# Patient Record
Sex: MALE | Race: WHITE
[De-identification: names, ages, dates, MRNs, and addresses within clinical notes are randomized per-mention and may not be internally consistent; named-entity substitution may affect disease eponyms.]

---

## 2019-03-07 ENCOUNTER — HOSPITAL ENCOUNTER (INPATIENT)
Dept: HOSPITAL 10 - E/R | Age: 69
LOS: 10 days | Discharge: NURSING, REHAB OR LONG-TERM CARE | DRG: 871 | End: 2019-03-17
Admitting: INTERNAL MEDICINE
Payer: MEDICARE

## 2019-03-07 ENCOUNTER — HOSPITAL ENCOUNTER (INPATIENT)
Dept: HOSPITAL 91 - E/R | Age: 69
LOS: 10 days | Discharge: NURSING, REHAB OR LONG-TERM CARE | DRG: 871 | End: 2019-03-17
Payer: MEDICARE

## 2019-03-07 VITALS — DIASTOLIC BLOOD PRESSURE: 42 MMHG | HEART RATE: 74 BPM | RESPIRATION RATE: 19 BRPM | SYSTOLIC BLOOD PRESSURE: 87 MMHG

## 2019-03-07 VITALS — SYSTOLIC BLOOD PRESSURE: 105 MMHG | HEART RATE: 85 BPM | RESPIRATION RATE: 20 BRPM | DIASTOLIC BLOOD PRESSURE: 68 MMHG

## 2019-03-07 VITALS — HEART RATE: 67 BPM

## 2019-03-07 VITALS
WEIGHT: 155.43 LBS | WEIGHT: 155.43 LBS | HEIGHT: 67 IN | BODY MASS INDEX: 24.39 KG/M2 | BODY MASS INDEX: 24.39 KG/M2 | HEIGHT: 67 IN

## 2019-03-07 VITALS — HEART RATE: 72 BPM

## 2019-03-07 VITALS — SYSTOLIC BLOOD PRESSURE: 86 MMHG | DIASTOLIC BLOOD PRESSURE: 44 MMHG | RESPIRATION RATE: 17 BRPM | HEART RATE: 66 BPM

## 2019-03-07 DIAGNOSIS — E03.9: ICD-10-CM

## 2019-03-07 DIAGNOSIS — I25.10: ICD-10-CM

## 2019-03-07 DIAGNOSIS — K29.70: ICD-10-CM

## 2019-03-07 DIAGNOSIS — N40.0: ICD-10-CM

## 2019-03-07 DIAGNOSIS — D62: ICD-10-CM

## 2019-03-07 DIAGNOSIS — J18.9: ICD-10-CM

## 2019-03-07 DIAGNOSIS — J96.01: ICD-10-CM

## 2019-03-07 DIAGNOSIS — Z89.511: ICD-10-CM

## 2019-03-07 DIAGNOSIS — G92: ICD-10-CM

## 2019-03-07 DIAGNOSIS — Z99.2: ICD-10-CM

## 2019-03-07 DIAGNOSIS — E78.5: ICD-10-CM

## 2019-03-07 DIAGNOSIS — I13.11: ICD-10-CM

## 2019-03-07 DIAGNOSIS — I21.4: ICD-10-CM

## 2019-03-07 DIAGNOSIS — I25.2: ICD-10-CM

## 2019-03-07 DIAGNOSIS — Z91.19: ICD-10-CM

## 2019-03-07 DIAGNOSIS — A41.9: Primary | ICD-10-CM

## 2019-03-07 DIAGNOSIS — N18.6: ICD-10-CM

## 2019-03-07 DIAGNOSIS — E11.22: ICD-10-CM

## 2019-03-07 DIAGNOSIS — Z95.1: ICD-10-CM

## 2019-03-07 DIAGNOSIS — R62.7: ICD-10-CM

## 2019-03-07 DIAGNOSIS — I21.A1: ICD-10-CM

## 2019-03-07 DIAGNOSIS — I27.20: ICD-10-CM

## 2019-03-07 DIAGNOSIS — N39.0: ICD-10-CM

## 2019-03-07 DIAGNOSIS — E87.6: ICD-10-CM

## 2019-03-07 LAB
% IRON SATURATION: 14 % SAT (ref 22–52)
ABNORMAL IP MESSAGE: 1
ADD MAN DIFF?: NO
ALANINE AMINOTRANSFERASE: 37 IU/L (ref 13–69)
ALBUMIN/GLOBULIN RATIO: 1.22
ALBUMIN: 4.4 G/DL (ref 3.3–4.9)
ALKALINE PHOSPHATASE: 202 IU/L (ref 42–121)
ANION GAP: 15 (ref 5–13)
ANISOCYTOSIS: (no result) (ref 0–0)
ASPARTATE AMINO TRANSFERASE: 20 IU/L (ref 15–46)
BAND NEUTROPHILS #M: 2.3 10^3/UL (ref 0–0.6)
BAND NEUTROPHILS % (M): 23 % (ref 0–4)
BASOPHIL #: 0 10^3/UL (ref 0–0.1)
BASOPHILS %: 0.1 % (ref 0–2)
BILIRUBIN,DIRECT: 0 MG/DL (ref 0–0.2)
BILIRUBIN,TOTAL: 0.4 MG/DL (ref 0.2–1.3)
BLOOD UREA NITROGEN: 41 MG/DL (ref 7–20)
CALCIUM: 9.3 MG/DL (ref 8.4–10.2)
CARBON DIOXIDE: 32 MMOL/L (ref 21–31)
CHLORIDE: 91 MMOL/L (ref 97–110)
CK INDEX: 4.1
CK INDEX: 5.3
CK-MB: 2.33 NG/ML (ref 0–2.4)
CK-MB: 3.97 NG/ML (ref 0–2.4)
CREATINE KINASE: 57 IU/L (ref 23–200)
CREATINE KINASE: 75 IU/L (ref 23–200)
CREATININE: 5.3 MG/DL (ref 0.61–1.24)
EOSINOPHILS #: 0 10^3/UL (ref 0–0.5)
EOSINOPHILS %: 0.2 % (ref 0–7)
FERRITIN: 752 NG/ML (ref 11.1–264)
FREE T4 (FREE THYROXINE): 1.64 NG/DL (ref 0.78–2.44)
GLOBULIN: 3.6 G/DL (ref 1.3–3.2)
GLUCOSE: 102 MG/DL (ref 70–220)
HEMATOCRIT: 20 % (ref 42–52)
HEMOGLOBIN A1C: 6.8 % (ref 0–5.9)
HEMOGLOBIN: 6.8 G/DL (ref 14–18)
HYPOCHROMASIA: (no result) (ref 0–0)
IMMATURE GRANS #M: 0.2 10^3/UL (ref 0–0.03)
IMMATURE GRANS % (M): 1.9 % (ref 0–0.43)
INR: 1.15
IRON: 35 UG/DL (ref 35–150)
LACTIC ACID: 1 MMOL/L (ref 0.5–2)
LYMPHOCYTES #: 0.6 10^3/UL (ref 0.8–2.9)
LYMPHOCYTES #M: 0.5 10^3/UL (ref 0.8–2.9)
LYMPHOCYTES % (M): 5 % (ref 15–51)
LYMPHOCYTES %: 5.8 % (ref 15–51)
MEAN CORPUSCULAR HEMOGLOBIN: 33.8 PG (ref 29–33)
MEAN CORPUSCULAR HGB CONC: 34 G/DL (ref 32–37)
MEAN CORPUSCULAR VOLUME: 99.5 FL (ref 82–101)
MEAN PLATELET VOLUME: 9.8 FL (ref 7.4–10.4)
MICROCYTOSIS: (no result) (ref 0–0)
MONOCYTE #: 0.5 10^3/UL (ref 0.3–0.9)
MONOCYTE #M: 0.2 10^3/UL (ref 0.3–0.9)
MONOCYTES % (M): 2 % (ref 0–11)
MONOCYTES %: 4.3 % (ref 0–11)
NEUTROPHIL #: 9.1 10^3/UL (ref 1.6–7.5)
NEUTROPHILS %: 87.7 % (ref 39–77)
NUCLEATED RED BLOOD CELLS #: 0 10^3/UL (ref 0–0)
NUCLEATED RED BLOOD CELLS%: 0 /100WBC (ref 0–0)
PARTIAL THROMBOPLASTIN TIME: 32.6 SEC (ref 23–35)
PLATELET COUNT: 133 10^3/UL (ref 140–415)
PLATELET ESTIMATE: NORMAL
POLYCHROMASIA: (no result) (ref 0–0)
POSITIVE DIFF: (no result)
POTASSIUM: 3.2 MMOL/L (ref 3.5–5.1)
PROSTATE SPECIFIC ANTIGEN: 3.8 NG/ML (ref 0–4)
PROTIME: 14.8 SEC (ref 11.9–14.9)
PT RATIO: 1.2
REACTIVE LYMPHOCYTES #M: 0.1 10^3/UL (ref 0–0)
REACTIVE LYMPHOCYTES% (M): 1 % (ref 0–0)
RED BLOOD COUNT: 2.01 10^6/UL (ref 4.7–6.1)
RED CELL DISTRIBUTION WIDTH: 16.5 % (ref 11.5–14.5)
SEG NEUT #M: 7.4 10^3/UL (ref 1.6–7.5)
SEGMENTED NEUTROPHILS (M) %: 69 % (ref 39–77)
SMUDGE%M: 1 % (ref 0–0)
SODIUM: 138 MMOL/L (ref 135–144)
THYROID STIMULATING HORMONE: 1.49 MIU/L (ref 0.47–4.68)
TOTAL IRON BINDING CAPACITY: 254 UG/DL (ref 241–421)
TOTAL PROTEIN: 8 G/DL (ref 6.1–8.1)
TROPONIN-I: 1.34 NG/ML (ref 0–0.12)
TROPONIN-I: 1.52 NG/ML (ref 0–0.12)
TROPONIN-I: 1.57 NG/ML (ref 0–0.12)
WHITE BLOOD COUNT: 10.4 10^3/UL (ref 4.8–10.8)

## 2019-03-07 PROCEDURE — P9016 RBC LEUKOCYTES REDUCED: HCPCS

## 2019-03-07 PROCEDURE — 85018 HEMOGLOBIN: CPT

## 2019-03-07 PROCEDURE — 96375 TX/PRO/DX INJ NEW DRUG ADDON: CPT

## 2019-03-07 PROCEDURE — 82962 GLUCOSE BLOOD TEST: CPT

## 2019-03-07 PROCEDURE — 86920 COMPATIBILITY TEST SPIN: CPT

## 2019-03-07 PROCEDURE — 94664 DEMO&/EVAL PT USE INHALER: CPT

## 2019-03-07 PROCEDURE — 84153 ASSAY OF PSA TOTAL: CPT

## 2019-03-07 PROCEDURE — 93306 TTE W/DOPPLER COMPLETE: CPT

## 2019-03-07 PROCEDURE — 36430 TRANSFUSION BLD/BLD COMPNT: CPT

## 2019-03-07 PROCEDURE — 82150 ASSAY OF AMYLASE: CPT

## 2019-03-07 PROCEDURE — 85610 PROTHROMBIN TIME: CPT

## 2019-03-07 PROCEDURE — 84100 ASSAY OF PHOSPHORUS: CPT

## 2019-03-07 PROCEDURE — 71045 X-RAY EXAM CHEST 1 VIEW: CPT

## 2019-03-07 PROCEDURE — 87086 URINE CULTURE/COLONY COUNT: CPT

## 2019-03-07 PROCEDURE — 80048 BASIC METABOLIC PNL TOTAL CA: CPT

## 2019-03-07 PROCEDURE — 82728 ASSAY OF FERRITIN: CPT

## 2019-03-07 PROCEDURE — 94640 AIRWAY INHALATION TREATMENT: CPT

## 2019-03-07 PROCEDURE — 83605 ASSAY OF LACTIC ACID: CPT

## 2019-03-07 PROCEDURE — 86901 BLOOD TYPING SEROLOGIC RH(D): CPT

## 2019-03-07 PROCEDURE — 87400 INFLUENZA A/B EACH AG IA: CPT

## 2019-03-07 PROCEDURE — 80053 COMPREHEN METABOLIC PANEL: CPT

## 2019-03-07 PROCEDURE — 83735 ASSAY OF MAGNESIUM: CPT

## 2019-03-07 PROCEDURE — 84443 ASSAY THYROID STIM HORMONE: CPT

## 2019-03-07 PROCEDURE — 86644 CMV ANTIBODY: CPT

## 2019-03-07 PROCEDURE — 84145 PROCALCITONIN (PCT): CPT

## 2019-03-07 PROCEDURE — 71275 CT ANGIOGRAPHY CHEST: CPT

## 2019-03-07 PROCEDURE — 83036 HEMOGLOBIN GLYCOSYLATED A1C: CPT

## 2019-03-07 PROCEDURE — 86900 BLOOD TYPING SEROLOGIC ABO: CPT

## 2019-03-07 PROCEDURE — 99291 CRITICAL CARE FIRST HOUR: CPT

## 2019-03-07 PROCEDURE — 86706 HEP B SURFACE ANTIBODY: CPT

## 2019-03-07 PROCEDURE — 93005 ELECTROCARDIOGRAM TRACING: CPT

## 2019-03-07 PROCEDURE — 84439 ASSAY OF FREE THYROXINE: CPT

## 2019-03-07 PROCEDURE — 80061 LIPID PANEL: CPT

## 2019-03-07 PROCEDURE — 97161 PT EVAL LOW COMPLEX 20 MIN: CPT

## 2019-03-07 PROCEDURE — 85025 COMPLETE CBC W/AUTO DIFF WBC: CPT

## 2019-03-07 PROCEDURE — 74018 RADEX ABDOMEN 1 VIEW: CPT

## 2019-03-07 PROCEDURE — 88312 SPECIAL STAINS GROUP 1: CPT

## 2019-03-07 PROCEDURE — 87081 CULTURE SCREEN ONLY: CPT

## 2019-03-07 PROCEDURE — 85730 THROMBOPLASTIN TIME PARTIAL: CPT

## 2019-03-07 PROCEDURE — 87340 HEPATITIS B SURFACE AG IA: CPT

## 2019-03-07 PROCEDURE — 80202 ASSAY OF VANCOMYCIN: CPT

## 2019-03-07 PROCEDURE — 85014 HEMATOCRIT: CPT

## 2019-03-07 PROCEDURE — 84154 ASSAY OF PSA FREE: CPT

## 2019-03-07 PROCEDURE — 96374 THER/PROPH/DIAG INJ IV PUSH: CPT

## 2019-03-07 PROCEDURE — 84484 ASSAY OF TROPONIN QUANT: CPT

## 2019-03-07 PROCEDURE — 82550 ASSAY OF CK (CPK): CPT

## 2019-03-07 PROCEDURE — 90935 HEMODIALYSIS ONE EVALUATION: CPT

## 2019-03-07 PROCEDURE — 83690 ASSAY OF LIPASE: CPT

## 2019-03-07 PROCEDURE — 87040 BLOOD CULTURE FOR BACTERIA: CPT

## 2019-03-07 PROCEDURE — 86850 RBC ANTIBODY SCREEN: CPT

## 2019-03-07 PROCEDURE — 36415 COLL VENOUS BLD VENIPUNCTURE: CPT

## 2019-03-07 PROCEDURE — 83540 ASSAY OF IRON: CPT

## 2019-03-07 PROCEDURE — C9113 INJ PANTOPRAZOLE SODIUM, VIA: HCPCS

## 2019-03-07 PROCEDURE — 88305 TISSUE EXAM BY PATHOLOGIST: CPT

## 2019-03-07 PROCEDURE — 82553 CREATINE MB FRACTION: CPT

## 2019-03-07 PROCEDURE — 81001 URINALYSIS AUTO W/SCOPE: CPT

## 2019-03-07 PROCEDURE — 30233N1 TRANSFUSION OF NONAUTOLOGOUS RED BLOOD CELLS INTO PERIPHERAL VEIN, PERCUTANEOUS APPROACH: ICD-10-PCS

## 2019-03-07 PROCEDURE — 70450 CT HEAD/BRAIN W/O DYE: CPT

## 2019-03-07 RX ADMIN — SODIUM CHLORIDE 1 ML: 9 INJECTION, SOLUTION INTRAMUSCULAR; INTRAVENOUS; SUBCUTANEOUS at 13:26

## 2019-03-07 RX ADMIN — POTASSIUM CHLORIDE 1 MEQ: 1500 TABLET, EXTENDED RELEASE ORAL at 18:38

## 2019-03-07 RX ADMIN — INSULIN ASPART 1 UNIT: 100 INJECTION, SOLUTION INTRAVENOUS; SUBCUTANEOUS at 18:00

## 2019-03-07 RX ADMIN — LEVALBUTEROL HYDROCHLORIDE 1 MG: 0.63 SOLUTION RESPIRATORY (INHALATION) at 20:07

## 2019-03-07 RX ADMIN — DOCUSATE SODIUM 1 MG: 100 CAPSULE, LIQUID FILLED ORAL at 20:55

## 2019-03-07 RX ADMIN — CEFEPIME 1 MLS/HR: 1 INJECTION, POWDER, FOR SOLUTION INTRAMUSCULAR; INTRAVENOUS at 12:34

## 2019-03-07 RX ADMIN — INSULIN ASPART 1 UNIT: 100 INJECTION, SOLUTION INTRAVENOUS; SUBCUTANEOUS at 20:54

## 2019-03-07 RX ADMIN — ACETAMINOPHEN 1 MG: 325 TABLET, FILM COATED ORAL at 12:16

## 2019-03-07 RX ADMIN — SEVELAMER CARBONATE SCH GM: 800 POWDER, FOR SUSPENSION ORAL at 20:55

## 2019-03-07 RX ADMIN — VASOPRESSIN 1 ML/22 S: 20 INJECTION, SOLUTION INTRAMUSCULAR; SUBCUTANEOUS at 13:26

## 2019-03-07 RX ADMIN — VANCOMYCIN HYDROCHLORIDE 1 MLS/HR: 1 INJECTION, POWDER, LYOPHILIZED, FOR SOLUTION INTRAVENOUS at 13:32

## 2019-03-07 RX ADMIN — LEVALBUTEROL HYDROCHLORIDE SCH MG: 0.63 SOLUTION RESPIRATORY (INHALATION) at 20:07

## 2019-03-07 RX ADMIN — IODIXANOL 1 ML: 320 INJECTION, SOLUTION INTRAVASCULAR at 13:26

## 2019-03-07 RX ADMIN — EPOETIN ALFA 1 UNITS: 10000 SOLUTION INTRAVENOUS; SUBCUTANEOUS at 20:56

## 2019-03-07 RX ADMIN — DOCUSATE SODIUM SCH MG: 100 CAPSULE, LIQUID FILLED ORAL at 20:55

## 2019-03-07 RX ADMIN — ATORVASTATIN CALCIUM SCH MG: 40 TABLET, FILM COATED ORAL at 20:56

## 2019-03-07 RX ADMIN — THIAMINE HYDROCHLORIDE 1 ML: 100 INJECTION, SOLUTION INTRAMUSCULAR; INTRAVENOUS at 12:15

## 2019-03-07 RX ADMIN — LIDOCAINE HYDROCHLORIDE 1 MLS/HR: 10 INJECTION, SOLUTION EPIDURAL; INFILTRATION; INTRACAUDAL; PERINEURAL at 12:10

## 2019-03-07 RX ADMIN — ATORVASTATIN CALCIUM 1 MG: 40 TABLET, FILM COATED ORAL at 20:56

## 2019-03-07 RX ADMIN — SEVELAMER CARBONATE 1 GM: 800 POWDER, FOR SUSPENSION ORAL at 20:55

## 2019-03-07 NOTE — CONS
Assessment/Plan


Assessment/Plan


Hospital Course (Demo Recall)


SIRS


Borderline hypotension


Elevated troponin


Acute blood loss anemia


End-stage renal disease on hemodialysis


Diabetes


History of open heart surgery





-Patient with not feeling well, febrile during hemodialysis.  Patient found to 


be severely anemic, febrile with blood pressure on the lower side


-We will continue with blood transfusion given hypotension and elevated troponin


-No aspirin the current time given anemia, no beta-blocker given low blood 


pressure.  Increase statin dose


-Check serial cardiac enzymes, echocardiogram, telemetry monitoring.





Consultation Date/Type/Reason


Admit Date/Time





Type of Consult


Cardiology


Reason for Consultation


Elevated troponin


Date/Time of Note


DATE: 3/7/19 


TIME: 16:55





Hx of Present Illness


This is a 68-year-old male with past medical history of end-stage renal disease 


on hemodialysis, diabetes, open heart surgery was brought to the emergency room 


secondary to diaphoresis, fever and not feeling well during hemodialysis.  His


tory is obtained from medical chart.  When patient asked why is he here, he 


tells me he was not feeling well.  He is feeling better now.  He denies any 


chest pain, shortness of breath, palpitations, dizziness.  Denies any abdominal 


pain or cough.


12 point review of systems was performed with all pertinent positives and 


negatives mentioned above and all else is negative





Past Medical History


Medical History:  diabetes, hypertension, renal disease


Home Meds


Reported Medications


Polyvinyl Alcohol (Tears Again) 15 Ml Drops, 15 ML OP DAILY PRN for DRY EYES, 


BOTTLE


   3/7/19


Ondansetron Hcl* (Zofran*) 4 Mg Tab, 4 MG PO Q6H PRN for NAUSEA AND OR VOMITING,


TAB


   3/7/19


Ascorbic Acid* (Vitamin C*) 500 Mg Capsule.sa, 500 MG PO DAILY, CAP


   3/7/19


Acetaminophen* (Acetaminophen*) 500 MG Extra Strength Tablet, 1000 MG PO Q4H  


PRN for MILD PAIN(1-3)OR ELEVATED TEMP, TAB


   3/7/19


Acetaminophen* (Acetaminophen*) 325 Mg Tablet, 325 MG PO Q4H PRN for PAIN AND OR


ELEVATED TEMP, #30 TAB


   3/7/19


Levothyroxine Sodium* (Levoxyl*) 50 Mcg Tablet, 50 MCG PO BEFORE BREAKFAST, #30 


TAB


   3/7/19


Sevelamer Carbonate* (Renvela*) 800 Mg Tablet, 0.8 GM PO WITH MEALS, TAB


   3/7/19


Multivitamins* (Theragran*) 1 Tab Tab, 1 TAB PO DAILY, TAB


   3/7/19


Atorvastatin Calcium* (Atorvastatin Calcium*) 20 Mg Tablet, 20 MG PO QHS, #30 


TAB


   3/7/19


Lisinopril* (Lisinopril*) 20 Mg Tablet, 20 MG PO DAILY for MON,WED,FRI,SUN., #30


TAB


   3/7/19


Atorvastatin Calcium* (Atorvastatin Calcium*) 20 Mg Tablet, 20 MG PO QHS, #30 


TAB


   3/7/19


Insulin Glargine* (Lantus*) 100 Unit/Ml Soln, 10 UNIT SC QHS, #1 VIAL


   3/7/19


Ferrous Sulfate* (Ferrous Sulfate*) 325 Mg Tabec, 325 MG PO DAILY, TAB


   3/7/19


Cranberry Extract (Cranberry) 425 Mg Capsule, 425 MG PO DAILY, CAP


   3/7/19


Carvedilol* (Coreg*) 6.25 Mg Tablet, 6.25 MG PO BID for TUE,THU,SAT, #60 TAB


   3/7/19


Docusate Sodium* (Docusate Sodium*) 100 Mg Capsule, 200 MG PO HS, #60 CAP


   3/7/19


Cyanocobalamin/FA/Pyridoxine (B Complex-Folic Acid Tablet) 1 Each Tablet, 1 TAB 


PO DAILY, TAB


   3/7/19


Aspirin* (Aspirin* Chew) 81 Mg Tab.chew, 81 MG PO DAILY, TAB.CHEW


   3/7/19


Medications





Current Medications


Ondansetron HCl (Zofran Inj) 4 mg ER BRIDGE PRN IV NAUSEA/VOMITING;  Start 


3/7/19 at 14:00;  Stop 3/8/19 at 13:59


Acetaminophen (Tylenol Tab) 650 mg ER BRIDGE PRN PO .MILD PAIN 1-3 OR TEMP;  


Start 3/7/19 at 14:00;  Stop 3/8/19 at 13:59


IV Flush (NS 3 ml) 3 ml PER PROTOCOL IV ;  Start 3/7/19 at 16:00


Levalbuterol (Xopenex Neb) 0.63 mg Q6H RESP  THERAPY HHN ;  Start 3/7/19 at 


20:00;  Status UNV


Miscellaneous Information (* Miscellaneous Pharmacy Order) Discontinue current 


oral sulfonylur... ONCE  ONCE XX ;  Start 3/7/19 at 16:30;  Stop 3/7/19 at 


16:31;  Status UNV


Miscellaneous Information (* Miscellaneous Pharmacy Order) HYPOGLYCEMIA PROTOCOL


w... ONCE  ONCE XX ;  Start 3/7/19 at 16:30;  Stop 3/7/19 at 16:31;  Status UNV


Insulin Aspart (Novolog Insulin Pen) NOVOLOG *MILD* ALGORITHM WITH MEALS  


BEDTIME SC ;  Start 3/7/19 at 18:00;  Status UNV


Miscellaneous Information (* Miscellaneous Pharmacy Order) Discontinue all 


previ... ONCE  ONCE XX ;  Start 3/7/19 at 16:30;  Stop 3/7/19 at 16:31;  Status 


UNV


Ascorbic Acid (Vitamin C) 500 mg DAILY PO ;  Start 3/8/19 at 09:00;  Status UNV


Atorvastatin Calcium (Lipitor) 20 mg QHS PO ;  Start 3/7/19 at 21:00;  Status 


UNV


Docusate Sodium (Colace) 200 mg HS PO ;  Start 3/7/19 at 21:00;  Status UNV


Ferrous Sulfate (Ferrous Sulfate (Ec)) 325 mg DAILY PO ;  Start 3/8/19 at 09:00;


 Status UNV


Levothyroxine Sodium (Synthroid) 50 mcg BEFORE  BREAKFAST PO ;  Start 3/8/19 at 


07:00;  Status UNV


Multivitamins Therapeutic (Theragran) 1 tab DAILY PO ;  Start 3/8/19 at 09:00;  


Status UNV


Sevelamer Carbonate (Renvela) 0.8 gm WITH  MEALS PO ;  Start 3/7/19 at 18:00;  


Status UNV


Allergies:  


Coded Allergies:  


     No Known Allergy (Unverified , 3/7/19)





Past Surgical History


Past Surgical Hx:  other (Open heart surgery, lower extremity amputation)





Social History


Alcohol Use:  none


Smoking Status:  Never smoker


Drug Use:  none





Exam/Review of Systems


Vital Signs


Vitals





Vital Signs


  Date      Temp  Pulse  Resp  B/P (MAP)   Pulse Ox  O2          O2 Flow    FiO2


Time                                                 Delivery    Rate


    3/7/19  98.3     75    20  92/41 (58)       100  Nasal             2.0


     16:48                                           Cannula








Exam


Exam


Able to answer questions difficulty with history.  No apparent distress, 


receiving blood transfusion in the emergency room


Constitutional:  alert


Head:  normocephalic


Respiratory:  other (Coarse breath sounds bilaterally, no wheezing)


Cardiovascular:  regular rate and rhythm (S1-S2 heard)


Gastrointestinal:  soft, non-tender, bowel sounds


Extremities:  other (Lower extremity amputation)





Labs


Result Diagram:  


3/7/19 1147                                                                     


          3/7/19 1147





Results 24hrs





Laboratory Tests


Test
                     3/7/19
11:47  3/7/19
12:11  3/7/19
16:20  3/7/19
16:26


White Blood Count               10.4


Red Blood Count                2.01  L


Hemoglobin                     6.8  *L


Hematocrit                     20.0  L


Mean Corpuscular                99.5


Volume


Mean Corpuscular               33.8  H


Hemoglobin


Mean Corpuscular               34.0  
  
             
             



Hemoglobin
Concent


Red Cell                       16.5  H


Distribution Width


Platelet Count                  133  L


Mean Platelet                    9.8


Volume


Immature                      1.900  H


Granulocytes %


Neutrophils %                  87.7  H


Segmented                        69  
  
             
             



Neutrophils


%
(Manual)


Band Neutrophils %               23  H


(Manual)


Lymphocytes %                   5.8  L


Lymphocytes %                     5  L


(Manual)


Reactive                         1  H
  
             
             



Lymphocytes


%
(Manual)


Monocytes %                      4.3


Monocytes %                        2


(Manual)


Eosinophils %                    0.2


Basophils %                      0.1


Nucleated Red                    0.0


Blood Cells %


Immature                      0.200  H


Granulocytes #


Neutrophils #                   9.1  H


Neutrophils #                    7.4


(Manual)


Band Neutrophils #              2.3  H


Lymphocytes                     0.5  L


(Manual)


Lymphocytes #                   0.6  L


Reactive                        0.1  H


Lymphocytes #


Monocytes #                      0.5


Monocytes #                     0.2  L


(Manual)


Eosinophils #                    0.0


Basophils #                      0.0


Nucleated Red                    0.0


Blood Cells #


Pathologist           
                 
             
             



Review
(Hematology


)


Platelet Estimate   NORMAL


Polychromasia                     2+


Hypochromasia                     1+


Anisocytosis                      2+


Microcytosis                      2+


Prothrombin Time                14.8


Prothrombin Time                 1.2


Ratio


INR International              1.15  
  
             
             



Normalized
Ratio


Activated                      32.6  
  
             
             



Partial
Thrombopla


st Time


Path Consult        APRIL ESTRELLA    
             
             



Signing
Pathologis  MD richmond


Sodium Level                     138


Potassium Level                 3.2  L


Chloride Level                   91  L


Carbon Dioxide                   32  H


Level


Anion Gap                        15  H


Blood Urea                       41  H


Nitrogen


Creatinine                     5.30  H


Est Glomerular                  11  L
  
             
             



Filtrat


Rate
mL/min


Glucose Level                    102


Calcium Level                    9.3


Total Bilirubin                  0.4


Direct Bilirubin                0.00


Indirect Bilirubin               0.4


Aspartate Amino                  20  
  
             
             



Transf
(AST/SGOT)


Alanine                          37  
  
             
             



Aminotransferase
(


ALT/SGPT)


Alkaline                        202  H


Phosphatase


Troponin I                   1.520  *H


Total Protein                    8.0


Albumin                          4.4


Globulin                       3.60  H


Albumin/Globulin                1.22


Ratio


POC Venous Lactate                             1.6           1.5


Hemoglobin A1c                                                            6.8  H








Imaging


Imaging


ECG demonstrates sinus rhythm 80 bpm,  ms with nonspecific 


intraventricular conduction delay, nonspecific ST abnormalities





Medications


Medications





Current Medications


Ondansetron HCl (Zofran Inj) 4 mg ER BRIDGE PRN IV NAUSEA/VOMITING;  Start 


3/7/19 at 14:00;  Stop 3/8/19 at 13:59


Acetaminophen (Tylenol Tab) 650 mg ER BRIDGE PRN PO .MILD PAIN 1-3 OR TEMP;  


Start 3/7/19 at 14:00;  Stop 3/8/19 at 13:59


IV Flush (NS 3 ml) 3 ml PER PROTOCOL IV ;  Start 3/7/19 at 16:00


Levalbuterol (Xopenex Neb) 0.63 mg Q6H RESP  THERAPY HHN ;  Start 3/7/19 at 


20:00;  Status UNV


Miscellaneous Information (* Miscellaneous Pharmacy Order) Discontinue current 


oral sulfonylur... ONCE  ONCE XX ;  Start 3/7/19 at 16:30;  Stop 3/7/19 at 


16:31;  Status UNV


Miscellaneous Information (* Miscellaneous Pharmacy Order) HYPOGLYCEMIA PROTOCOL


w... ONCE  ONCE XX ;  Start 3/7/19 at 16:30;  Stop 3/7/19 at 16:31;  Status UNV


Insulin Aspart (Novolog Insulin Pen) NOVOLOG *MILD* ALGORITHM WITH MEALS  


BEDTIME SC ;  Start 3/7/19 at 18:00;  Status UNV


Miscellaneous Information (* Miscellaneous Pharmacy Order) Discontinue all 


previ... ONCE  ONCE XX ;  Start 3/7/19 at 16:30;  Stop 3/7/19 at 16:31;  Status 


UNV


Ascorbic Acid (Vitamin C) 500 mg DAILY PO ;  Start 3/8/19 at 09:00;  Status UNV


Atorvastatin Calcium (Lipitor) 20 mg QHS PO ;  Start 3/7/19 at 21:00;  Status 


UNV


Docusate Sodium (Colace) 200 mg HS PO ;  Start 3/7/19 at 21:00;  Status UNV


Ferrous Sulfate (Ferrous Sulfate (Ec)) 325 mg DAILY PO ;  Start 3/8/19 at 09:00;


 Status UNV


Levothyroxine Sodium (Synthroid) 50 mcg BEFORE  BREAKFAST PO ;  Start 3/8/19 at 


07:00;  Status UNV


Multivitamins Therapeutic (Theragran) 1 tab DAILY PO ;  Start 3/8/19 at 09:00;  


Status UNV


Sevelamer Carbonate (Renvela) 0.8 gm WITH  MEALS PO ;  Start 3/7/19 at 18:00;  


Status UNV











Steve Wesley DO            Mar 7, 2019 17:05

## 2019-03-07 NOTE — HP
Date/Time of Note


Date/Time of Note


DATE: 3/7/19 


TIME: 15:36





Assessment/Plan


VTE Prophylaxis


Pharmacological prophylaxis:  NA/contraindicated


Pharm contraindication:  anticoag not tolerated





Lines/Catheters


IV Catheter Type (from [facility]):  Saline Lock





Assessment/Plan


Hospital Course


68-year-old male with comorbidities including end-stage renal disease on 


hemodialysis Tuesdays/Thursdays/Saturdays, hypertension, diabetes mellitus, an


emia, prostatic hypertrophy, hypothyroidism, and CAD status post coronary artery


bypass grafting.  The patient was brought in from a skilled nursing facility 


because of fevers and diaphoresis with evidence of hypoxia at the skilled 


nursing facility.  The patient was noticed to have elevated troponins, anemia, 


and left sided infiltrate on chest x-ray in the emergency room.  The patient 


will be admitted to inpatient setting for further treatment and evaluation.





1. NSTEMI.


-Etiology unclear.  


-Prior history of CAD.  Unable to start aspirin because of underlying anemia.


-Obtain cardiology consult


-Trend troponins


-Obtain 2D echocardiogram.





2.  Healthcare associated pneumonia.


-Continue antimicrobials





3.  Acute respiratory failure.


-Hypoxic


-Most probably secondary to #2.


-Continue supplemental oxygen.


-Start inhaled bronchodilators.


-CTA negative for any PE.





4.  Normocytic anemia.


-Etiology unclear.


-Transfuse blood products.  


-Obtain stool for OB.


-Obtain iron panel.





5.  Acute encephalopathy.


-Unknown baseline mental status


-Most probably toxic metabolic in origin.


-Brain CT scan negative.





6.  End-stage renal disease on hemodialysis


-Obtain nephrology consult.


-May need hemodialysis since the patient got IV dye for CTA.





7.  Diabetes mellitus.


-Start the patient on sliding scale insulin along with pre-meal insulin basal 


insulin.


-Obtain hemoglobin A1c to evaluate the blood glucose control over the past few 


weeks.





8.  History of CAD.


-Hold aspirin because of anemia.


-Continue statins.


-Cardiology follow-up.





9.  Hypothyroidism.


-Resume Synthroid.





10.  Dyslipidemia.


-Continue statins.





11.  Hypertension.


-Hold antihypertensives now since the patient is currently hypotensive.








Plan: The patient will be admitted to inpatient telemetry floor. The patient 


will be started on a renal diet. The patient will be started on DVT prophylaxis 


(SCD on the left lower extremity ) and gastrointestinal prophylaxis. The patient


will remain a full code. Activities will be bedrest.





The rest of the patient's management will be based on the clinical course, 


inputs from consultants, and the results of diagnostic studies.





Based on the patient's clinical presentation, he most probably requires at least


2 midnights' stay for further management and evaluation of his clinical pres


entation.





The patient was seen in collaboration with Dr. Coley.


Result Diagram:  


3/7/19 1147                                                                     


          3/7/19 1147





Results 24hrs





Laboratory Tests


     Test
                                       3/7/19
11:47  3/7/19
12:11


     White Blood Count                                 10.4


     Red Blood Count                                  2.01  L


     Hemoglobin                                       6.8  *L


     Hematocrit                                       20.0  L


     Mean Corpuscular Volume                           99.5


     Mean Corpuscular Hemoglobin                      33.8  H


     Mean Corpuscular Hemoglobin
Concent              34.0  
  



     Red Cell Distribution Width                      16.5  H


     Platelet Count                                    133  L


     Mean Platelet Volume                               9.8


     Immature Granulocytes %                         1.900  H


     Neutrophils %                                    87.7  H


     Segmented Neutrophils %
(Manual)                   69  
  



     Band Neutrophils % (Manual)                        23  H


     Lymphocytes %                                     5.8  L


     Lymphocytes % (Manual)                              5  L


     Reactive Lymphocytes %
(Manual)                    1  H
  



     Monocytes %                                        4.3


     Monocytes % (Manual)                                 2


     Eosinophils %                                      0.2


     Basophils %                                        0.1


     Nucleated Red Blood Cells %                        0.0


     Immature Granulocytes #                         0.200  H


     Neutrophils #                                     9.1  H


     Neutrophils # (Manual)                             7.4


     Band Neutrophils #                                2.3  H


     Lymphocytes (Manual)                              0.5  L


     Lymphocytes #                                     0.6  L


     Reactive Lymphocytes #                            0.1  H


     Monocytes #                                        0.5


     Monocytes # (Manual)                              0.2  L


     Eosinophils #                                      0.0


     Basophils #                                        0.0


     Nucleated Red Blood Cells #                        0.0


     Pathologist Review
(Hematology)        
                  



     Platelet Estimate                    NORMAL


     Polychromasia                                       2+


     Hypochromasia                                       1+


     Anisocytosis                                        2+


     Microcytosis                                        2+


     Prothrombin Time                                  14.8


     Prothrombin Time Ratio                             1.2


     INR International Normalized
Ratio               1.15  
  



     Activated Partial
Thromboplast Time              32.6  
  



     Path Consult Signing
Pathologist     APRIL ESTRELLA MD  



     Sodium Level                                       138


     Potassium Level                                   3.2  L


     Chloride Level                                     91  L


     Carbon Dioxide Level                               32  H


     Anion Gap                                          15  H


     Blood Urea Nitrogen                                41  H


     Creatinine                                       5.30  H


     Est Glomerular Filtrat Rate
mL/min                11  L
  



     Glucose Level                                      102


     Calcium Level                                      9.3


     Total Bilirubin                                    0.4


     Direct Bilirubin                                  0.00


     Indirect Bilirubin                                 0.4


     Aspartate Amino Transf
(AST/SGOT)                  20  
  



     Alanine Aminotransferase
(ALT/SGPT)                37  
  



     Alkaline Phosphatase                              202  H


     Troponin I                                     1.520  *H


     Total Protein                                      8.0


     Albumin                                            4.4


     Globulin                                         3.60  H


     Albumin/Globulin Ratio                            1.22


     POC Venous Lactate                                               1.6








HPI/ROS


Admit Date/Time


Admit Date/Time








Hx of Present Illness


Reason for admission: Transferred from a skilled nursing facility because of 


febrile illness and hypoxia.  Elevated troponins and anemia on ER workup.





Consultants


1. Steve Wesley, , Cardiology.


2. Mehrdad Lemus DO, Nephrology.





This is a 68-year-old male with comorbidities including hypertension, diabetes 


mellitus type 2, history of ESRD on hemodialysis, prostatic hypertrophy, 


hypothyroidism, and chronic anemia who went for scheduled hemodialysis today.  


The patient went back from the dialysis clinic to his SNF with fevers and 


diaphoresis.  The patient had a recorded temperature of 103 F with oxygen 


saturation of 84% on room air with a blood pressure of 119/57 and pulse of 86.  


The patient's primary care physician was informed by the nursing staff and the 


nursing staff was instructed to take the patient to the nearest emergency room.





The patient's baseline mental status is unclear.  It was unable to elicit 


specific details from the patient.  The patient denied any chest pain or 


dyspnea.  The patient complained of feeling tired.  The patient denied any upper


or lower gastrointestinal bleeding.  The patient complained of a productive 


cough that has been going on for 1 month.  The patient denied any nausea, 


vomiting, abdominal pain, or diarrhea.  In the emergency room, the patient was 


noticed to have a troponin level of 1.520.  Patient did not have any underlying 


leukocytosis.  The patient had bandemia.  The patient had a temperature of 


100.4.  Patient's chest x-ray was showing left lower lobe 


infiltrate/atelectasis.  The patient underwent a CT angiogram of the chest that 


was showing no evidence of any PE.  However it showed atelectasis/pneumonia at 


the left lung base posteriorly and laterally.  The patient's brain CT scan was 


negative for any acute findings.  The patient was noticed to have anemia with a 


hemoglobin and hematocrit of 6.8 and 20.0 respectively.  The patient was started


on blood transfusion.  The patient was treated with IV cefepime and vancomycin 


in the emergency room.





ROS


Subjective hx not possible:  other (Patient not a good historian.)





PMH/Family/Social


Past Medical History


1.  Type 2 diabetes mellitus.


2.  CAD status post CABG.


3.  End-stage renal disease on hemodialysis


4.  Anemia.


5.  Hypertension.


6.  Diabetic foot ulcer.


7.  Dyslipidemia.


8.  Hypothyroidism.


Medications





Current Medications


Ondansetron HCl (Zofran Inj) 4 mg ER BRIDGE PRN IV NAUSEA/VOMITING;  Start 


3/7/19 at 14:00;  Stop 3/8/19 at 13:59


Acetaminophen (Tylenol Tab) 650 mg ER BRIDGE PRN PO .MILD PAIN 1-3 OR TEMP;  


Start 3/7/19 at 14:00;  Stop 3/8/19 at 13:59


Coded Allergies:  


     No Known Allergy (Unverified , 3/7/19)





Past Surgical History


1. Coronary artery bypass grafting.


2. Right below-knee amputation





Social History


The patient is a nursing home resident.


Alcohol Use:  none


Smoking Status:  Never smoker


Drug Use:  none





Exam/Review of Systems


Vital Signs


Vitals





Vital Signs


  Date      Temp   Pulse  Resp  B/P (MAP)   Pulse Ox  O2         O2 Flow    FiO2


Time                                                  Delivery   Rate


    3/7/19                                            Nasal              2


     13:03                                            Cannula


    3/7/19  100.4     80    20  90/20 (43)        96


     11:39








Exam


Exam


General: Adequately build 68 year-old male lying in bed in no apparent distress.


HEENT: Normocephalic, atraumatic. Eyes: Anicteric sclerae, conjunctivae clear. 


ENT: Nasal septum is midline, oral mucosa is dry. Neck supple, no JVD noticed.


Respiratory: Bilaterally diminished breath sounds. No use of accessory muscles o


f respiration. No adventitious breath sounds.


Cardiovascular: S1, S2 heard.  Regular rate and rhythm.


Abdomen: Soft, nontender, and nondistended. Bowel sounds positive in all 4 


quadrants.


Genitourinary: Deferred.


Extremities: No cyanosis, no clubbing, no edema.  Right lower extremity below-


knee amputation.


Neurologic: The patient is awake and alert.  Oriented x1.


Skin: Normal skin turgor. No skin rashes.











JEREMY PADILLA NP                Mar 7, 2019 15:36

## 2019-03-07 NOTE — CONS
DATE OF ADMISSION: 03/07/2019

DATE OF CONSULTATION:  03/07/2019

 

 

 

REASON FOR CONSULTATION:  End-stage renal disease.

 

REQUESTING PHYSICIAN:  Dr. Morris and nurse practitioner, Rodolfo Mayorga.

 

HISTORY OF PRESENT ILLNESS:  This is a 64-year-old male with a past medical history of end-stage kizzy
l disease on dialysis Tuesday, Thursday, Saturday with access Perm-A-Cath, history of hypertension, d
iabetes, anemia, BPH, coronary artery disease, status post coronary artery bypass graft, who presents
 to [facility] emergency room from his nursing facility due to being very diaphoretic and hy
poxemic.  The patient upon arrival to emergency room had a chest x-ray which showed evidence of a lef
t-sided infiltrate.  The patient was started on IV antibiotics in the emergency room.  The patient al
so noted to have elevated troponin of 1.5 and a hemoglobin of 6.8.

 

In terms of patient's renal history, the patient does not know the name of his nephrologist or where 
he dialyzes.  The patient states that he did have hemodialysis today.  There are no reports of any he
moptysis, hematemesis, hematochezia.

 

PAST MEDICAL HISTORY:  History of end-stage renal disease, history of anemia, history of mineral bone
 disorder, history of coronary dyslipidemia, diabetes, BPH.

 

PAST SURGICAL HISTORY:  Status post PermCath placement, status post CABG, status post right below kne
e amputation.

 

FAMILY HISTORY:  No family history of kidney disease.

 

SOCIAL HISTORY:  Does not drink, smoke, do drugs.

 

MEDICATIONS:  The patient's medications have been reviewed.

 

REVIEW OF SYSTEMS:  A 14-point review of systems conducted.  Pertinent positives stated in HPI, other
wise, negative.

 

PHYSICAL EXAMINATION:

VITAL SIGNS:  Blood pressure is 11/36, respiratory rate 20, pulse 71, temperature 98.2.

HEENT:  Head is normocephalic.

NECK:  Supple.

HEART:  Regular rate.

LUNGS:  Show diminished breath sounds at base.

ABDOMEN:  Soft, nontender to palpation without rebound or guarding.

EXTREMITIES:  Negative for clubbing, cyanosis.  Positive right below knee amputation.

DERMATOLOGIC:  No rashes.

MUSCULOSKELETAL:  No joint effusions.

NEUROLOGIC:  Limited exam, but no obvious focal deficits.

 

LABORATORY DATA:  Shows sodium 133 4.2, chloride 91, bicarbonate 32, BUN 41, creatinine 5.30, hemoglo
bin A1c 6.8.  White count 10.4, hemoglobin of 6.8, platelet count is 133.

 

IMAGING STUDIES:  The patient's chest x-ray shows left lower lobe infiltrate.  A CT angio shows a pos
sible pneumonia, left base; cardiomegaly; history of sternotomy dialysis catheter.  No evidence of PE
.

 

ASSESSMENT AND PLAN:  This is a 68-year-old male with:

1.  End-stage renal disease.  The patient is on dialysis Tuesday, Thursday, Saturday with access Perm
-A-Cath.  The patient's last hemodialysis was today.  Plan is for hemodialysis possibly tomorrow, Sat
urday.  We will monitor closely.

2.  Anemia.  The patient's hemoglobin 6.8.  Plan is to give the patient Epogen.  We will check an iro
n panel.

3.  Mineral bone disorder, monitor calcium and phosphorus levels.

4.  Hypokalemia.  We will replete with potassium chloride.

5.  Acute hypoxic respiratory failure, etiology secondary to pneumonia and sepsis.  Continue current 
medical management.  Continue supplemental oxygen and bronchodilators.

6.  Sepsis, etiology may be secondary to pneumonia.  Continue current antibiotic regimen.

7.  Elevated troponin, possible non-ST elevation myocardial infarction type 1 versus type 2.  We will
 check serial troponins.  Check a 2D echo.  Continue medical management.  Follow up with cardiology.

8.  Acute encephalopathy, etiology is toxic metabolic.  Continue to monitor.

9.  Diabetes.  Continue current insulin regimen.

10.  History of coronary artery disease.  Continue medical management.

11.  Hypothyroidism.  Continue Synthroid.

12.  Dyslipidemia.  Continue ____ therapy.

13.  Hypertension.  Continue to monitor.

 

Thank you, Rodolfo, for this interesting consult.  It will be a pleasure to follow patient with you t
julissaout the hospital course.

 

 

Dictated By: ELODIA RAHMAN DO

 

NR/NTS

DD:    03/07/2019 17:16:23

DT:    03/07/2019 18:05:56

Conf#: [id number]

DID#:  [id number]

## 2019-03-07 NOTE — RADRPT
Echocardiogram Report

 

 

Patient Name: HERBERT CHANDPatient ID: [id number]

: 1950 (68y 8m)Study Date: 2019 3:12:48 PM

Gender: MAccession #: [id number]

Tech: MATTHEW Orellana RDCS                        Location: [facility]         

Ref.Physician: STEVE WESLEY            Height(Cm):            

 

BSA: Weight(Kg):

Quality: AdequateAccount #:

 

 

Procedures:

Echocardiographic Report:

Transthoracic echocardiogram with complete 2D, M-Mode, and doppler 

examination.

 

Indications:

elevated BNP, and Syncope.

 

 

Measurements:

2D/M Mode                                          Doppler                                           
    

Measurement    Value    Normal Range               Measurement      Value    Normal Range            
    

LVIDd 2D       5.2      [ 4.2 - 5.8 ] cm           AV Peak Crow      1.4      [ 100.0 - 170.0 ] cm/sec
    

LVIDs 2D       4.4      [ 2.5 - 4.0 ] cm           AV Peak PG       8.0      [ 2.0 - 9.0 ] mmHg      
    

LVPWd 2D       1.0      [ 0.6 - 1.0 ] cm           LVOT Peak Crow    0.9      [ 70.0 - 110.0 ] cm/sec 
    

IVSd 2D        1.0      [ 0.6 - 1.0 ] cm           LVOT Peak PG     3.0      [ 2.0 - 6.0 ] mmHg      
    

AoR Diam 2D    3.4      [ 2.6 - 3.4 ] cm           MV E Peak Crow    1.3      [ 60.0 - 130.0 ] cm/sec 
    

EDV 2D         130.0    [ 62.0 - 150.0 ] ml        MV A Peak Crow    0.6      [ 100.0 - 120.0 ] cm/sec
    

ESV 2D         89.1     [ 21.0 - 61.0 ] ml         MV E/A           2.1      [ 0.8 - 1.5 ] ratio     
    

EF 2D          31.5     [ 52.0 - 72.0 ] percent    MV Decel Time    155      [ 104 - 258 ] msec      
    

LA Dimen 2D    4.9      [ 3.0 - 4.0 ] cm           Lat E` Crow       0.1      [ 10.0 - 15.0 ] cm/sec  
    

                                                   Lateral E/E`     19.9     [ 1.0 - 2.0 ] ratio     
    

                                                   MV E/A           2.1      [ 0.8 - 1.5 ] ratio     
    

                                                   TR Peak Crow      3.3      [ 100.0 - 280.0 ] cm/sec
    

                                                   TR Peak PG       43.0     mmHg                    
    

                                                   RVSP             51.0     [ 10.0 - 36.0 ] mmHg    
    

                                                   RA Pressure      8.0      mmHg                    
    

 

Findings:

Left Ventricle:

Normal left ventricular systolic function. Normal left ventricular 

cavity size. Mild concentric left ventricular hypertrophy. Ejection 

fraction is visually estimated at 55 %. Tissue Doppler/Mitral Doppler 

indices are consistent with pseudonormalization with mildly elevated 

left atrial pressure (Stage II diastolic dysfunction).

Right Ventricle:

Normal right ventricular size. Normal right ventricular systolic 

function.

Left Atrium:

There is moderate enlargement of left atrium.

Right Atrium:

The right atrium is normal in size.

Mitral Valve:

Mitral valve leaflets appear mildly thickened. Mild mitral annular 

calcification. Mild mitral valve regurgitation.

Aortic Valve:

No significant aortic stenosis or insufficiency. Aortic cusps appear 

mildly calcified.

Tricuspid Valve:

Normal appearance of the tricuspid valve. Estimated peak PA systolic 

pressure 51 mmHg. There is mild tricuspid regurgitation.

Pulmonic Valve:

Normal pulmonic valve appearance.

Pericardium:

Normal pericardium with no significant pericardial effusion.

Aorta:

Normal aortic root.

IVC:

Dilated IVC with respiratory collapse consistent with elevated right 

atrial pressure.

 

 

Conclusions:

 Normal left ventricular systolic function. Normal left ventricular 

cavity size. Mild concentric left ventricular hypertrophy. Ejection 

fraction is visually estimated at 55 %. Tissue Doppler/Mitral Doppler 

indices are consistent with pseudonormalization with mildly elevated 

left atrial pressure (Stage II diastolic dysfunction).

 Normal right ventricular size. Normal right ventricular systolic 

function.

 There is moderate enlargement of left atrium.

 The right atrium is normal in size.

 Mild mitral valve regurgitation.

 No significant aortic stenosis or insufficiency.

 Estimated peak PA systolic pressure 51 mmHg. There is mild tricuspid 

regurgitation.

 Normal pericardium with no significant pericardial effusion.

 

Electronically Signed By:

 

Steve Wesley

2019 18:13:12 PST

## 2019-03-07 NOTE — ERD
ER Documentation


Chief Complaint


Chief Complaint





BIB [facility] FOR EVAL LOW O2 SAT AFTER HD. DIAPHORETIC





HPI


This is a 68-year-old male with a past medical history of hypertension, 


hyperlipidemia, diabetes, hypothyroidism, coronary artery disease with a 


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 


BKA, end-stage renal disease status post right chest tunneled dialysis catheter 


on hemodialysis Monday/Wednesday/Friday who is presenting from his dialysis 


center with fever, diaphoresis, shortness of breath and a reported low O2 


saturation.  During dialysis, the patient's temperature was found to be at 103 


F.  His O2 saturation was reportedly at 84% on room air.  The patient's primary


care physician was called who recommended transfer to the hospital.  The patient


is currently alert and oriented x1.  He is typically fully oriented.





The patient does not endorse any complaints at this time, but history and 


physical is limited secondary to altered mentation.





ROS


Review of systems limited secondary to altered mentation





Medications


Home Meds


Reported Medications


Polyvinyl Alcohol (Tears Again) 15 Ml Drops, 15 ML OP DAILY PRN for DRY EYES, 


BOTTLE


   3/7/19


Ondansetron Hcl* (Zofran*) 4 Mg Tab, 4 MG PO Q6H PRN for NAUSEA AND OR VOMITING,


TAB


   3/7/19


Ascorbic Acid* (Vitamin C*) 500 Mg Capsule.sa, 500 MG PO DAILY, CAP


   3/7/19


Acetaminophen* (Acetaminophen*) 500 MG Extra Strength Tablet, 1000 MG PO Q4H  


PRN for MILD PAIN(1-3)OR ELEVATED TEMP, TAB


   3/7/19


Acetaminophen* (Acetaminophen*) 325 Mg Tablet, 325 MG PO Q4H PRN for PAIN AND OR


ELEVATED TEMP, #30 TAB


   3/7/19


Levothyroxine Sodium* (Levoxyl*) 50 Mcg Tablet, 50 MCG PO BEFORE BREAKFAST, #30 


TAB


   3/7/19


Sevelamer Carbonate* (Renvela*) 800 Mg Tablet, 0.8 GM PO WITH MEALS, TAB


   3/7/19


Multivitamins* (Theragran*) 1 Tab Tab, 1 TAB PO DAILY, TAB


   3/7/19


Atorvastatin Calcium* (Atorvastatin Calcium*) 20 Mg Tablet, 20 MG PO QHS, #30 


TAB


   3/7/19


Lisinopril* (Lisinopril*) 20 Mg Tablet, 20 MG PO DAILY for MON,WED,FRI,SUN., #30


TAB


   3/7/19


Atorvastatin Calcium* (Atorvastatin Calcium*) 20 Mg Tablet, 20 MG PO QHS, #30 


TAB


   3/7/19


Insulin Glargine* (Lantus*) 100 Unit/Ml Soln, 10 UNIT SC QHS, #1 VIAL


   3/7/19


Ferrous Sulfate* (Ferrous Sulfate*) 325 Mg Tabec, 325 MG PO DAILY, TAB


   3/7/19


Cranberry Extract (Cranberry) 425 Mg Capsule, 425 MG PO DAILY, CAP


   3/7/19


Carvedilol* (Coreg*) 6.25 Mg Tablet, 6.25 MG PO BID for TUE,THU,SAT, #60 TAB


   3/7/19


Docusate Sodium* (Docusate Sodium*) 100 Mg Capsule, 200 MG PO HS, #60 CAP


   3/7/19


Cyanocobalamin/FA/Pyridoxine (B Complex-Folic Acid Tablet) 1 Each Tablet, 1 TAB 


PO DAILY, TAB


   3/7/19


Aspirin* (Aspirin* Chew) 81 Mg Tab.chew, 81 MG PO DAILY, TAB.CHEW


   3/7/19





Allergies


Allergies:  


Coded Allergies:  


     No Known Allergy (Unverified , 3/7/19)





PMhx/Soc


History of Surgery:  Yes (R BKA, CABG)


Anesthesia Reaction:  No


Hx Neurological Disorder:  No


Hx Cardiac Disorders:  Yes (Tension, hyperlipidemia, diabetes, CAD with previous


MI sp CABG)


Hx Miscellaneous Medical Probl:  Yes (ESRD on HD, R BKA)





FmHx


Family History:  diabetes





Physical Exam


Vitals





Vital Signs


  Date      Temp   Pulse  Resp  B/P (MAP)   Pulse Ox  O2         O2 Flow    FiO2


Time                                                  Delivery   Rate


    3/7/19   98.4     76    20  98/34 (55)       100  Nasal            2.0


     16:01                                            Cannula


    3/7/19                                                             2.0


     15:55


    3/7/19                                            Nasal              2


     13:03                                            Cannula


    3/7/19  100.4     80    20  90/20 (43)        96


     11:39





Physical Exam


Const:   No apparent distress, well-developed, well-nourished


Head:   Normocephalic, Atraumatic 


Eyes:   Normal Conjunctiva.  Extraocular movements intact. Pupils equal, round 


and reactive to light


ENT:   Normal External Ears, Nose and Mouth.


Neck:   Full range of motion. No meningismus.


Resp:   Bibasilar rales.  No wheezes.  No respiratory distress.


Cardio:   Regular rate and rhythm. No murmurs, rubs or gallops. Right chest 


tunneled dialysis catheter without erythema or induration or purulence or 


fluctuance.


Abd:   Soft, non tender, non distended. Normal bowel sounds


Skin:   No petechiae or rashes


Back:   No midline tenderness. No CVA tenderness


Ext:   No cyanosis, or edema.  Right BKA.


Neur:   Awake and alert, oriented 1.  Cranial nerves intact.  No facial droop. 


Normal strength, sensation and coordination.


Result Diagram:  


3/7/19 1147                                                                     


          3/7/19 1147





Results 24 hrs





Laboratory Tests


     Test
                                       3/7/19
11:47  3/7/19
12:11


     White Blood Count                          10.4 10^3/ul


     Red Blood Count                            2.01 10^6/ul


     Hemoglobin                                     6.8 g/dl


     Hematocrit                                       20.0 %


     Mean Corpuscular Volume                         99.5 fl


     Mean Corpuscular Hemoglobin                     33.8 pg


     Mean Corpuscular Hemoglobin
Concent          34.0 g/dl 
  



     Red Cell Distribution Width                      16.5 %


     Platelet Count                              133 10^3/UL


     Mean Platelet Volume                             9.8 fl


     Immature Granulocytes %                         1.900 %


     Neutrophils %                                    87.7 %


     Segmented Neutrophils %
(Manual)                  69 % 
  



     Band Neutrophils % (Manual)                        23 %


     Lymphocytes %                                     5.8 %


     Lymphocytes % (Manual)                              5 %


     Reactive Lymphocytes %
(Manual)                    1 % 
  



     Monocytes %                                       4.3 %


     Monocytes % (Manual)                                2 %


     Eosinophils %                                     0.2 %


     Basophils %                                       0.1 %


     Nucleated Red Blood Cells %                 0.0 /100WBC


     Immature Granulocytes #                   0.200 10^3/ul


     Neutrophils #                               9.1 10^3/ul


     Neutrophils # (Manual)                      7.4 10^3/ul


     Band Neutrophils #                          2.3 10^3/ul


     Lymphocytes (Manual)                        0.5 10^3/ul


     Lymphocytes #                               0.6 10^3/ul


     Reactive Lymphocytes #                      0.1 10^3/ul


     Monocytes #                                 0.5 10^3/ul


     Monocytes # (Manual)                        0.2 10^3/ul


     Eosinophils #                               0.0 10^3/ul


     Basophils #                                 0.0 10^3/ul


     Nucleated Red Blood Cells #                 0.0 10^3/ul


     Pathologist Review
(Hematology)       
                   



     Platelet Estimate                    NORMAL


     Polychromasia                                        2+


     Hypochromasia                                        1+


     Anisocytosis                                         2+


     Microcytosis                                         2+


     Prothrombin Time                               14.8 Sec


     Prothrombin Time Ratio                              1.2


     INR International Normalized
Ratio                1.15 
  



     Activated Partial
Thromboplast Time           32.6 Sec 
  



     Path Consult Signing
Pathologist     APRIL ESTRELLA MD  



     Sodium Level                                 138 mmol/L


     Potassium Level                              3.2 mmol/L


     Chloride Level                                91 mmol/L


     Carbon Dioxide Level                          32 mmol/L


     Anion Gap                                            15


     Blood Urea Nitrogen                            41 mg/dl


     Creatinine                                   5.30 mg/dl


     Est Glomerular Filtrat Rate
mL/min           11 mL/min 
  



     Glucose Level                                 102 mg/dl


     Calcium Level                                 9.3 mg/dl


     Total Bilirubin                               0.4 mg/dl


     Direct Bilirubin                             0.00 mg/dl


     Indirect Bilirubin                            0.4 mg/dl


     Aspartate Amino Transf
(AST/SGOT)              20 IU/L 
  



     Alanine Aminotransferase
(ALT/SGPT)            37 IU/L 
  



     Alkaline Phosphatase                           202 IU/L


     Troponin I                                  1.520 ng/ml


     Total Protein                                  8.0 g/dl


     Albumin                                        4.4 g/dl


     Globulin                                      3.60 g/dl


     Albumin/Globulin Ratio                             1.22


     POC Venous Lactate                                         1.6 mmol/L





Current Medications


 Medications
   Dose
          Sig/Melinda
       Start Time
   Status  Last


 (Trade)       Ordered        Route
 PRN     Stop Time              Admin
Dose


                              Reason                                Admin


 Sodium         2,100 ml       BOLUS OVER 2   3/7/19        DC            3/7/19


Chloride
                     HOURS STAT
    11:53
 3/7/19                12:15



(NS)                          IV*
           11:55


                650 mg         ONCE  ONCE
    3/7/19        DC            3/7/19


Acetaminophen                 PO
            12:00
 3/7/19                12:16




  (Tylenol                                  12:01


Tab)


 Sodium         0 ml @ 0
      Q0M ONCE
      3/7/19        DC            3/7/19


Chloride       mls/hr         IV
            12:10
 3/7/19                12:10



                                             12:18


 Vancomycin     250 ml @ 
     ONCE  ONCE
    3/7/19        DC            3/7/19


HCl            125 mls/hr     IVPB
          13:00
 3/7/19                13:32



                                             14:59


 Cefepime HCl   50 ml @ 
      ONCE  ONCE
    3/7/19        DC            3/7/19


               100 mls/hr     IVPB
          12:30
 3/7/19                12:34



                                             12:59


 IV Flush
      10 ml          STK-MED        3/7/19        DC            3/7/19


(NS 10 ml)                    ONCE
 .ROUTE
  13:03
 3/7/19                13:26



                                             13:04


 Sodium         100 ml @ ud    STK-MED        3/7/19        DC            3/7/19


Chloride                      ONCE
 .ROUTE
  13:03
 3/7/19                13:26



                                             13:04


 Iodixanol
     100 ml         STK-MED        3/7/19        DC            3/7/19


(Visipaque                    ONCE
 .ROUTE
  13:03
 3/7/19                13:26



Locm)                                        13:04


 Ondansetron    4 mg           [facility]      3/7/19                 



HCl
  (Zofran                 PRN
 IV
       14:00
 3/8/19


Inj)                          NAUSEA/VOMITI  13:59


                              NG


                650 mg         [facility]      3/7/19                 



Acetaminophen                 PRN
 PO
       14:00
 3/8/19



  (Tylenol                   .MILD PAIN     13:59


Tab)                          1-3 OR TEMP


 IV Flush
      3 ml           PER            3/7/19                 



(NS 3 ml)                     PROTOCOL
 IV
  16:00









Procedures/MDM


MDM





The patient's presentation warrants further investigation. Previous medical 


records, if available, were reviewed.





LABS





The patient's laboratory testing was obtained and reviewed. No emergent 


treatment was required unless described below.





CBC:   No leukocytosis, but significant bandemia, concerning for an infection.  


Normocytic anemia requiring transfusion.  Thrombocytopenia.


Chemistry:   No E/o severe acidosis or liver disease or diabetic ketoacidosis.  


Metabolic alkalosis likely related to his end-stage renal disease.


PT/INR:   No E/o significant coagulopathy


Lactate:   No E/o severe sepsis


Troponin:   Elevated, though this is in the setting of end-stage renal disease


Urine:   Not sent due to end-stage renal disease


Cultures:   Sent





EKG





EKG read by me: 


Rate/Rhythm:    Regular rate and rhythm at a rate of 80 bpm


Intervals:    Normal QRS.  QT interval is less than 50% of the RR interval.  


Nonspecific intraventricular block with a slightly widened QRS.


Axis:    Left shifted


Impression:    No evidence of acute ischemia or emergent arrhythmia.





IMAGING





Imaging and Radiology interpretation reviewed.





CXR


FINDINGS: The heart is enlarged.  The thoracic aorta is calcified. The patient 


is status post sternotomy. There is a right-sided Perma-Cath in place. There is 


a left lower lobe infiltrate and atelectasis. There is no pleural effusion or 


pneumothorax.   


IMPRESSION: Mild cardiomegaly. Calcified aorta consistent with atherosclerotic 


disease. Left lower lobe infiltrate and atelectasis.


Electronically viewed and signed by .Burke Echevarria MD, MD on 03/07/2019 


12:48 





CTA Chest


FINDINGS:


The pulmonary arteries are normal with no filling defect or lack of enhancement 


to suggest pulmonary artery embolism.


There is air space disease in the left lung base posteriorly and laterally 


consistent with atelectasis or pneumonia. The lungs are otherwise clear with no 


other airspace or interstitial disease.


There is no pulmonary nodule or mass lesion.


There is no pneumothorax.


There is no mediastinal or hilar lymphadenopathy or mass.


There is no pleural effusion. There is no pericardial effusion.


The thoracic aorta is not dilated. There is calcification in the aorta 


consistent with atherosclerosis.


The heart is enlarged. There is coronary artery calcification. There are sternal


wires and mediastinal clips. There is a right internal jugular vein tunneled 


dialysis catheter with the tip in the cavoatrial junction.


Images through the upper abdomen demonstrate normal visualized portions of the 


liver, spleen, and adrenals.   


The osseous structures are normal with no fracture or lytic lesion.


IMPRESSION:


1.  Normal CT pulmonary angiogram with no evidence of pulmonary artery embolism.


2.  Atelectasis or pneumonia at the left lung base posteriorly and laterally.


3.  Atherosclerosis.


4.  Cardiomegaly.


5.  Coronary artery calcification.


6.  Previous median sternotomy.


7.  Tunneled dialysis catheter in satisfactory position.


8.  Otherwise unremarkable study.    


Electronically viewed and signed by Messi Vigil MD, MD on 03/07/2019 13:55 





CT Head


FINDINGS: There is no intracranial hemorrhage, mass effect, or midline shift.  N


o extra-axial fluid collection is seen. The ventricles and sulci are age 


appropriate. Mild diffuse volume loss is present. Subtle decreased attenuation 


is noted in the bilateral subcortical white matter, centrum semiovale and 


periventricular white matter compatible with mild chronic microvascular ischemic


disease. Moderate vascular calcifications are present of the bilateral 


intracranial internal carotid arteries and the vertebral arteries.  The 


visualized scalp and calvarium are normal. The bilateral orbits are normal. The 


bilateral paranasal sinuses, mastoid air cells and middle ear cavities are 


clear.   


IMPRESSION: No evidence of acute intracranial hemorrhage, infarcts, or acute 


intracranial pathology. Normal noncontrast head CT.


Electronically viewed and signed by .Mere Walsh MD, MD on 03/07/2019 


13:34 





TREATMENT/DISPOSITION





The patient presents for fever, altered mentation, shortness of breath and 


hypoxia beginning this morning.  Given his fever, a septic workup was completed.


 The patient does have a bandemia.  He does meet criteria for a systemic 


inflammatory response syndrome and he does have findings concerning for a 


pneumonia, which meets criteria for sepsis.  The patient's lactic acid is 


unremarkable.  There is no evidence of endorgan damage.  The patient does not 


meet criteria for severe sepsis.  That said, IV fluids and antibiotics were 


initiated in this patient.  The patient is on dialysis and is at risk for 


bacteremia.  That said, I do have higher suspicion for pneumonia then I do 


bacteremia.





The patient was also found to be anemic.  The patient was transfused 1 unit of 


packed red blood cells emergently.  The patient may be assessed for this further


in the hospital.  This could be related to his end-stage renal disease as well.





The patient's mentation improved with IV fluid resuscitation and blood 


transfusion.  I have very low suspicion for meningitis or encephalitis.  There 


is no evidence of cerebral ischemia or intracranial hemorrhage on CT.





The patient does have an elevated troponin, which can be assessed further in the


hospital.  The patient does not have symptoms concerning for acute coronary 


syndrome, and his EKG did not reveal findings of acute ischemia.  I suspect that


the patient's elevated troponin is related to his end-stage renal disease.





The patient does have metabolic abnormalities including hypokalemia and a metab


olic alkalosis.  I suspect that this too is related to his end-stage renal 


disease and his dialysis sessions.





Cardiology and nephrology will require consultation in the hospital.





SEPSIS NOTE





SIRS Criteria:      Fever, bandemia   





Infectious source:    Pneumonia





End organ damage indicated by: None





SEPSIS MANAGEMENT





Time to recognize sepsis:    Upon arrival.


Time to recognize severe sepsis:   No severe sepsis at this time.


Time to recognize septic shock:    No septic shock at this time.





3 HOUR BUNDLE





Blood cultures x 2 before abx:    Yes


30 ml/kg NS bolus    completed


Initial lactate       1.6


Repeat lactate        not indicated





SEPTIC SHOCK ASSESSMENT:





NO lactic acid > 4.0 


NO persistent hypotension (SBP < 90 or 40 mmHg drop, MAP < 65) despite 30 L/kg 


IV fluid bolus





CRITICAL CARE NOTE





Time: 34 minutes excluding all billable procedures.





Treatments/Evaluations: The patient was at risk of hemodynamic compromise.  


Timing of critical care involved close serial monitoring, evaluation of the 


patient's medical record including previous records & current laboratory/imaging


studies, potential interventions for prevention of hemodynamic/ cardiopulmonary/


neurologic compromise, maintaining tight fluid balance, and any discussions with


the family and/or consultants regarding the patient's status and prognosis.





ADMISSION





At this time, I feel that the patient requires admission for further evaluation 


and management. The patient will be admitted to panel in accordance with the 


patient's insurance.  The patient was accepted by Dr. Coley at 1:25 PM on March 7, 2019.





Disclaimer: Inadvertent spelling and grammatical errors are likely due to 


EHR/dictation software use and do not reflect on the overall quality of patient 


care. Note that the electronic time recorded on this note does not necessarily r


eflect the actual time of the patient encounter.





Departure


Diagnosis:  


   Primary Impression:  


   Sepsis


   Sepsis type:  sepsis due to unspecified organism  Qualified Codes:  A41.9 - 


   Sepsis, unspecified organism


   Additional Impressions:  


   Fever


   Fever type:  unspecified  Qualified Codes:  R50.9 - Fever, unspecified


   Altered mental status


   Altered mental status type:  unspecified  Qualified Codes:  R41.82 - Altered 


   mental status, unspecified


   Symptomatic anemia


   Thrombocytopenia


   Left lower lobe pneumonia


   Pneumonia type:  due to unspecified organism  Qualified Codes:  J18.1 - Lobar


   pneumonia, unspecified organism


   Bandemia


   Metabolic alkalosis


   End stage renal disease on dialysis


   Hypokalemia


   Elevated troponin I level


Condition:  Serious











COLIN CARBALLO MD               Mar 7, 2019 12:07

## 2019-03-08 VITALS — DIASTOLIC BLOOD PRESSURE: 39 MMHG | HEART RATE: 75 BPM | SYSTOLIC BLOOD PRESSURE: 139 MMHG

## 2019-03-08 VITALS — HEART RATE: 74 BPM | RESPIRATION RATE: 18 BRPM | SYSTOLIC BLOOD PRESSURE: 92 MMHG | DIASTOLIC BLOOD PRESSURE: 50 MMHG

## 2019-03-08 VITALS — RESPIRATION RATE: 19 BRPM | SYSTOLIC BLOOD PRESSURE: 114 MMHG | HEART RATE: 74 BPM | DIASTOLIC BLOOD PRESSURE: 55 MMHG

## 2019-03-08 VITALS — HEART RATE: 61 BPM

## 2019-03-08 VITALS — HEART RATE: 80 BPM | DIASTOLIC BLOOD PRESSURE: 67 MMHG | SYSTOLIC BLOOD PRESSURE: 108 MMHG

## 2019-03-08 VITALS — SYSTOLIC BLOOD PRESSURE: 135 MMHG | HEART RATE: 81 BPM | RESPIRATION RATE: 17 BRPM | DIASTOLIC BLOOD PRESSURE: 63 MMHG

## 2019-03-08 VITALS — DIASTOLIC BLOOD PRESSURE: 60 MMHG | HEART RATE: 68 BPM | RESPIRATION RATE: 18 BRPM | SYSTOLIC BLOOD PRESSURE: 131 MMHG

## 2019-03-08 VITALS — HEART RATE: 72 BPM | SYSTOLIC BLOOD PRESSURE: 140 MMHG | DIASTOLIC BLOOD PRESSURE: 71 MMHG

## 2019-03-08 VITALS — SYSTOLIC BLOOD PRESSURE: 135 MMHG | DIASTOLIC BLOOD PRESSURE: 80 MMHG | HEART RATE: 82 BPM

## 2019-03-08 VITALS — SYSTOLIC BLOOD PRESSURE: 153 MMHG | DIASTOLIC BLOOD PRESSURE: 81 MMHG | HEART RATE: 81 BPM

## 2019-03-08 VITALS — RESPIRATION RATE: 20 BRPM | DIASTOLIC BLOOD PRESSURE: 75 MMHG | HEART RATE: 75 BPM | SYSTOLIC BLOOD PRESSURE: 130 MMHG

## 2019-03-08 VITALS — RESPIRATION RATE: 18 BRPM | DIASTOLIC BLOOD PRESSURE: 39 MMHG | SYSTOLIC BLOOD PRESSURE: 92 MMHG | HEART RATE: 66 BPM

## 2019-03-08 VITALS — HEART RATE: 72 BPM

## 2019-03-08 VITALS — HEART RATE: 74 BPM | DIASTOLIC BLOOD PRESSURE: 45 MMHG | SYSTOLIC BLOOD PRESSURE: 131 MMHG

## 2019-03-08 VITALS — HEART RATE: 77 BPM

## 2019-03-08 VITALS — DIASTOLIC BLOOD PRESSURE: 35 MMHG | SYSTOLIC BLOOD PRESSURE: 136 MMHG | HEART RATE: 71 BPM

## 2019-03-08 VITALS — SYSTOLIC BLOOD PRESSURE: 143 MMHG | DIASTOLIC BLOOD PRESSURE: 41 MMHG | HEART RATE: 74 BPM

## 2019-03-08 VITALS — SYSTOLIC BLOOD PRESSURE: 122 MMHG | DIASTOLIC BLOOD PRESSURE: 70 MMHG | HEART RATE: 78 BPM

## 2019-03-08 VITALS — SYSTOLIC BLOOD PRESSURE: 117 MMHG | HEART RATE: 69 BPM | RESPIRATION RATE: 19 BRPM | DIASTOLIC BLOOD PRESSURE: 52 MMHG

## 2019-03-08 VITALS — DIASTOLIC BLOOD PRESSURE: 38 MMHG | HEART RATE: 69 BPM | SYSTOLIC BLOOD PRESSURE: 133 MMHG

## 2019-03-08 VITALS — DIASTOLIC BLOOD PRESSURE: 43 MMHG | HEART RATE: 69 BPM | SYSTOLIC BLOOD PRESSURE: 130 MMHG

## 2019-03-08 VITALS — DIASTOLIC BLOOD PRESSURE: 51 MMHG | SYSTOLIC BLOOD PRESSURE: 164 MMHG | HEART RATE: 72 BPM

## 2019-03-08 VITALS — SYSTOLIC BLOOD PRESSURE: 117 MMHG | HEART RATE: 76 BPM | DIASTOLIC BLOOD PRESSURE: 52 MMHG

## 2019-03-08 VITALS — HEART RATE: 73 BPM | DIASTOLIC BLOOD PRESSURE: 42 MMHG | SYSTOLIC BLOOD PRESSURE: 160 MMHG

## 2019-03-08 VITALS — HEART RATE: 69 BPM

## 2019-03-08 VITALS — HEART RATE: 73 BPM | DIASTOLIC BLOOD PRESSURE: 53 MMHG | SYSTOLIC BLOOD PRESSURE: 150 MMHG

## 2019-03-08 LAB
ABNORMAL IP MESSAGE: 1
ADD MAN DIFF?: NO
ALANINE AMINOTRANSFERASE: 34 IU/L (ref 13–69)
ALBUMIN/GLOBULIN RATIO: 1.11
ALBUMIN: 3.8 G/DL (ref 3.3–4.9)
ALKALINE PHOSPHATASE: 143 IU/L (ref 42–121)
ANION GAP: 16 (ref 5–13)
ASPARTATE AMINO TRANSFERASE: 19 IU/L (ref 15–46)
BASOPHIL #: 0 10^3/UL (ref 0–0.1)
BASOPHILS %: 0.2 % (ref 0–2)
BILIRUBIN,DIRECT: 0 MG/DL (ref 0–0.2)
BILIRUBIN,TOTAL: 0.3 MG/DL (ref 0.2–1.3)
BLOOD UREA NITROGEN: 53 MG/DL (ref 7–20)
CALCIUM: 8.3 MG/DL (ref 8.4–10.2)
CARBON DIOXIDE: 28 MMOL/L (ref 21–31)
CHLORIDE: 95 MMOL/L (ref 97–110)
CHOL/HDL RATIO: (no result) RATIO
CHOLESTEROL: < 50 MG/DL (ref 100–200)
CK INDEX: 8.1
CK-MB: 6.64 NG/ML (ref 0–2.4)
CREATINE KINASE: 82 IU/L (ref 23–200)
CREATININE: 6.96 MG/DL (ref 0.61–1.24)
EOSINOPHILS #: 0.1 10^3/UL (ref 0–0.5)
EOSINOPHILS %: 1 % (ref 0–7)
GLOBULIN: 3.4 G/DL (ref 1.3–3.2)
GLUCOSE: 89 MG/DL (ref 70–220)
HDL CHOLESTEROL: 17 MG/DL (ref 30–78)
HEMATOCRIT: 20.1 % (ref 42–52)
HEMATOCRIT: 20.8 % (ref 42–52)
HEMATOCRIT: 30.2 % (ref 42–52)
HEMOGLOBIN: 10 G/DL (ref 14–18)
HEMOGLOBIN: 6.6 G/DL (ref 14–18)
HEMOGLOBIN: 6.8 G/DL (ref 14–18)
HEPATITIS B SURFACE ANTIBODY: (no result)
HEPATITIS B SURFACE ANTIGEN: NEGATIVE
IMMATURE GRANS #M: 0.16 10^3/UL (ref 0–0.03)
IMMATURE GRANS % (M): 1.3 % (ref 0–0.43)
IMMEDIATE SPIN CROSSMATCH: 1 4
LDL CHOLESTEROL,CALCULATED: (no result) MG/DL
LYMPHOCYTES #: 1.5 10^3/UL (ref 0.8–2.9)
LYMPHOCYTES %: 12.1 % (ref 15–51)
MAGNESIUM: 2.5 MG/DL (ref 1.7–2.5)
MEAN CORPUSCULAR HEMOGLOBIN: 33.2 PG (ref 29–33)
MEAN CORPUSCULAR HGB CONC: 32.8 G/DL (ref 32–37)
MEAN CORPUSCULAR VOLUME: 101 FL (ref 82–101)
MEAN PLATELET VOLUME: 9.8 FL (ref 7.4–10.4)
MONOCYTE #: 0.9 10^3/UL (ref 0.3–0.9)
MONOCYTES %: 7.7 % (ref 0–11)
NEUTROPHIL #: 9.5 10^3/UL (ref 1.6–7.5)
NEUTROPHILS %: 77.7 % (ref 39–77)
NUCLEATED RED BLOOD CELLS #: 0 10^3/UL (ref 0–0)
NUCLEATED RED BLOOD CELLS%: 0 /100WBC (ref 0–0)
PHOSPHORUS: 4.8 MG/DL (ref 2.5–4.9)
PLATELET COUNT: 114 10^3/UL (ref 140–415)
POSITIVE DIFF: (no result)
POTASSIUM: 4 MMOL/L (ref 3.5–5.1)
RED BLOOD COUNT: 1.99 10^6/UL (ref 4.7–6.1)
RED CELL DISTRIBUTION WIDTH: 17.1 % (ref 11.5–14.5)
SODIUM: 139 MMOL/L (ref 135–144)
TOTAL PROTEIN: 7.2 G/DL (ref 6.1–8.1)
TRIGLYCERIDES: 134 MG/DL (ref 0–149)
TROPONIN-I: 1.99 NG/ML (ref 0–0.12)
WHITE BLOOD COUNT: 12.2 10^3/UL (ref 4.8–10.8)

## 2019-03-08 PROCEDURE — 5A1D70Z PERFORMANCE OF URINARY FILTRATION, INTERMITTENT, LESS THAN 6 HOURS PER DAY: ICD-10-PCS | Performed by: INTERNAL MEDICINE

## 2019-03-08 RX ADMIN — CEFEPIME SCH MLS/HR: 1 INJECTION, POWDER, FOR SOLUTION INTRAMUSCULAR; INTRAVENOUS at 18:16

## 2019-03-08 RX ADMIN — PANTOPRAZOLE SODIUM SCH MG: 40 INJECTION, POWDER, FOR SOLUTION INTRAVENOUS at 20:30

## 2019-03-08 RX ADMIN — HEPARIN SODIUM 1 UNIT: 1000 INJECTION, SOLUTION INTRAVENOUS; SUBCUTANEOUS at 18:24

## 2019-03-08 RX ADMIN — SEVELAMER CARBONATE SCH GM: 800 POWDER, FOR SUSPENSION ORAL at 18:00

## 2019-03-08 RX ADMIN — INSULIN ASPART 1 UNIT: 100 INJECTION, SOLUTION INTRAVENOUS; SUBCUTANEOUS at 08:00

## 2019-03-08 RX ADMIN — OXYCODONE HYDROCHLORIDE AND ACETAMINOPHEN SCH MG: 500 TABLET ORAL at 08:23

## 2019-03-08 RX ADMIN — LEVALBUTEROL HYDROCHLORIDE 1 MG: 0.63 SOLUTION RESPIRATORY (INHALATION) at 14:21

## 2019-03-08 RX ADMIN — EPOETIN ALFA 1 UNITS: 10000 SOLUTION INTRAVENOUS; SUBCUTANEOUS at 18:18

## 2019-03-08 RX ADMIN — LEVALBUTEROL HYDROCHLORIDE 1 MG: 0.63 SOLUTION RESPIRATORY (INHALATION) at 07:59

## 2019-03-08 RX ADMIN — LEVOTHYROXINE SODIUM 1 MCG: 50 TABLET ORAL at 06:44

## 2019-03-08 RX ADMIN — DOCUSATE SODIUM SCH MG: 100 CAPSULE, LIQUID FILLED ORAL at 20:36

## 2019-03-08 RX ADMIN — LEVALBUTEROL HYDROCHLORIDE SCH MG: 0.63 SOLUTION RESPIRATORY (INHALATION) at 07:59

## 2019-03-08 RX ADMIN — LEVALBUTEROL HYDROCHLORIDE 1 MG: 0.63 SOLUTION RESPIRATORY (INHALATION) at 01:57

## 2019-03-08 RX ADMIN — DOCUSATE SODIUM 1 MG: 100 CAPSULE, LIQUID FILLED ORAL at 20:36

## 2019-03-08 RX ADMIN — PANTOPRAZOLE SODIUM SCH MG: 40 INJECTION, POWDER, FOR SOLUTION INTRAVENOUS at 18:16

## 2019-03-08 RX ADMIN — PANTOPRAZOLE SODIUM 1 MG: 40 INJECTION, POWDER, FOR SOLUTION INTRAVENOUS at 20:30

## 2019-03-08 RX ADMIN — SEVELAMER CARBONATE SCH GM: 800 POWDER, FOR SUSPENSION ORAL at 11:08

## 2019-03-08 RX ADMIN — SEVELAMER CARBONATE 1 GM: 800 POWDER, FOR SUSPENSION ORAL at 08:24

## 2019-03-08 RX ADMIN — SEVELAMER CARBONATE 1 GM: 800 POWDER, FOR SUSPENSION ORAL at 11:08

## 2019-03-08 RX ADMIN — THERA TABS SCH TAB: TAB at 08:23

## 2019-03-08 RX ADMIN — VANCOMYCIN HYDROCHLORIDE 1 MLS/HR: 500 INJECTION, POWDER, LYOPHILIZED, FOR SOLUTION INTRAVENOUS at 11:07

## 2019-03-08 RX ADMIN — FERROUS SULFATE TAB 325 MG (65 MG ELEMENTAL FE) SCH MG: 325 (65 FE) TAB at 08:23

## 2019-03-08 RX ADMIN — FERROUS SULFATE TAB 325 MG (65 MG ELEMENTAL FE) 1 MG: 325 (65 FE) TAB at 08:23

## 2019-03-08 RX ADMIN — OXYCODONE HYDROCHLORIDE AND ACETAMINOPHEN 1 MG: 500 TABLET ORAL at 08:23

## 2019-03-08 RX ADMIN — LEVALBUTEROL HYDROCHLORIDE 1 MG: 0.63 SOLUTION RESPIRATORY (INHALATION) at 19:25

## 2019-03-08 RX ADMIN — INSULIN ASPART 1 UNIT: 100 INJECTION, SOLUTION INTRAVENOUS; SUBCUTANEOUS at 18:00

## 2019-03-08 RX ADMIN — SEVELAMER CARBONATE 1 GM: 800 POWDER, FOR SUSPENSION ORAL at 18:00

## 2019-03-08 RX ADMIN — ATORVASTATIN CALCIUM SCH MG: 40 TABLET, FILM COATED ORAL at 20:36

## 2019-03-08 RX ADMIN — LEVALBUTEROL HYDROCHLORIDE SCH MG: 0.63 SOLUTION RESPIRATORY (INHALATION) at 14:21

## 2019-03-08 RX ADMIN — SEVELAMER CARBONATE SCH GM: 800 POWDER, FOR SUSPENSION ORAL at 08:24

## 2019-03-08 RX ADMIN — INSULIN ASPART 1 UNIT: 100 INJECTION, SOLUTION INTRAVENOUS; SUBCUTANEOUS at 20:35

## 2019-03-08 RX ADMIN — LEVALBUTEROL HYDROCHLORIDE SCH MG: 0.63 SOLUTION RESPIRATORY (INHALATION) at 01:57

## 2019-03-08 RX ADMIN — LEVALBUTEROL HYDROCHLORIDE SCH MG: 0.63 SOLUTION RESPIRATORY (INHALATION) at 19:25

## 2019-03-08 RX ADMIN — CEFEPIME 1 MLS/HR: 1 INJECTION, POWDER, FOR SOLUTION INTRAMUSCULAR; INTRAVENOUS at 18:16

## 2019-03-08 RX ADMIN — LEVOTHYROXINE SODIUM SCH MCG: 50 TABLET ORAL at 06:44

## 2019-03-08 RX ADMIN — PANTOPRAZOLE SODIUM 1 MG: 40 INJECTION, POWDER, FOR SOLUTION INTRAVENOUS at 18:16

## 2019-03-08 RX ADMIN — ATORVASTATIN CALCIUM 1 MG: 40 TABLET, FILM COATED ORAL at 20:36

## 2019-03-08 RX ADMIN — THERA TABS 1 TAB: TAB at 08:23

## 2019-03-08 RX ADMIN — INSULIN ASPART 1 UNIT: 100 INJECTION, SOLUTION INTRAVENOUS; SUBCUTANEOUS at 11:18

## 2019-03-08 NOTE — PN
DATE:  03/08/2019

 

 

SUBJECTIVE:  The patient is stable, no events overnight.  No fever, chills, nausea or vomiting.

 

OBJECTIVE:

VITAL SIGNS:  Blood pressure is 92/50, respirations 18, pulse 74, temperature 98.2.

HEENT:  Head is normocephalic.

NECK:  Supple.

HEART:  Regular rate.

LUNGS:  Show diminished breath sounds at the base.

ABDOMEN:  Soft, nontender to palpation without rebound or guarding.

EXTREMITIES:  Negative for clubbing, cyanosis, no edema, positive BKA.

DERMATOLOGIC:  No rashes.

MUSCULOSKELETAL:  No joint effusion.

NEUROLOGIC:  No change in exam.

 

MEDICATIONS:  Reviewed.

 

LABORATORY DATA:  From 03/08/2019 was reviewed.  The patient has a hemoglobin of 6.6, platelet count 
is 114.

 

ASSESSMENT AND PLAN:

1.  End-stage renal disease.  The patient is on dialysis Tuesday, Thursday, Saturday, last hemodialys
is was Thursday.  Plan is for hemodialysis tomorrow.

2.  Anemia.  Continue to monitor hemoglobin and hematocrit levels.  The patient is pending blood martinez
sfusion.  We will continue Epogen.

3.  Mineral bone disorder, monitor calcium and phosphorus levels.

4.  Hypokalemia, improved.  Continue to monitor.

5.  Acute respiratory failure secondary to pneumonia and sepsis.  Continue current medical management
.  Continue supplemental oxygen, bronchodilators.

6.  Elevated troponin, possible non-STEMI type 1.  Continue to monitor.  Continue serial troponins.  
Follow up with cardiology, holding aspirin in setting of severe anemia.

7.  Acute encephalopathy, etiology is toxic metabolic, improving.

8.  Diabetes.  Continue current insulin regimen.

9.  History of coronary artery disease.  Continue medical management.

10.  Hypothyroidism.  Continue Synthroid.

11.  Dyslipidemia.  Continue statin therapy.

12.  Hypertension.  We will continue to monitor.

 

 

Dictated By: ELODIA RAHMAN DO

 

NR/NTS

DD:    03/08/2019 08:58:46

DT:    03/08/2019 09:54:14

Conf#: [id number]

DID#:  [id number]

CC: BUCK ABDI MD; STEPHANE SENA MD;*EndCC*

## 2019-03-08 NOTE — PN
Date/Time of Note


Date/Time of Note


DATE: 3/8/19 


TIME: 12:33





Assessment/Plan


VTE Prophylaxis


Risk score (from [facility] [place])>0 risk:  4


SCD applied (from [facility] [place]):  No


SCD contraindicated:  other


Pharmacological prophylaxis:  NA/contraindicated


Pharm contraindication:  anticoag not tolerated





Lines/Catheters


IV Catheter Type (from [facility]):  Permacath


Urinary Cath still in place:  No





Assessment/Plan


Hospital Course


SUBJECTIVE: Denies any complaints.





OBJECTIVE:


Physical Exam


General: Adequately build 68 year-old male lying in bed in no apparent distress.


HEENT: Normocephalic, atraumatic. Eyes: Anicteric sclerae, conjunctivae clear. 


ENT: Nasal septum is midline, oral mucosa is dry. Neck supple, no JVD noticed.


Respiratory: Bilaterally diminished breath sounds. No use of accessory muscles 


of respiration. No adventitious breath sounds.


Cardiovascular: S1, S2 heard.  Regular rate and rhythm.


Abdomen: Soft, nontender, and nondistended. Bowel sounds positive in all 4 


quadrants.


Genitourinary: Deferred.


Extremities: No cyanosis, no clubbing, no edema.  Right lower extremity below-


knee amputation.


Neurologic: The patient is awake and alert.  Oriented x1-2.


Skin: Normal skin turgor. No skin rashes.





Labs & Vitals per chart





ASSESSMENT & PLAN 





68-year-old male with comorbidities including end-stage renal disease on 


hemodialysis Tuesdays/Thursdays/Saturdays, hypertension, diabetes mellitus, 


anemia, prostatic hypertrophy, hypothyroidism, and CAD status post coronary 


artery bypass grafting.  The patient was brought in from a skilled nursing 


facility because of fevers and diaphoresis with evidence of hypoxia at the 


skilled nursing facility.  The patient was noticed to have elevated troponins, 


anemia, and left sided infiltrate on chest x-ray in the emergency room.  The 


patient was admitted to inpatient setting for further treatment and evaluation.





1. NSTEMI.


-Etiology unclear.  


-Prior history of CAD.  Unable to start aspirin because of underlying anemia.


-Cardiology following.  2D echocardiogram showing preserved left ventricular 


ejection fraction.





2.  Healthcare associated pneumonia.


-Continue antimicrobials





3.  Acute respiratory failure.


-Hypoxic


-Most probably secondary to #2.


-Continue supplemental oxygen.


-Continue inhaled bronchodilators.


-CTA negative for any PE.





4.  Normocytic anemia.


-Etiology unclear.


-Transfuse blood products.  


-Obtain stool for OB.


-Gastroenterology consult obtained.


-Continue PPI.





5.  Acute encephalopathy.


-Unknown baseline mental status


-Most probably toxic metabolic in origin.


-Brain CT scan negative.





6.  End-stage renal disease on hemodialysis


-Nephrology following.





7.  Diabetes mellitus.


-Continue the patient on sliding scale insulin along with pre-meal insulin and 


basal insulin.


-Hemoglobin A1c 6.8.





8.  History of CAD.


-Hold aspirin because of anemia.


-Continue statins.


-Cardiology follow-up.





9.  Hypothyroidism.


-Continue Synthroid.





10.  Dyslipidemia.


-Continue statins.





11.  Hypertension.


-Hold antihypertensives now since the patient is currently hypotensive.





12.  Fluids, electrolytes, and nutrition.


-Renal, carbohydrate controlled diet.





13.  DVT prophylaxis.


-SCD to the left lower extremity.





14.  Plan.


-Transfuse blood products as needed.


-Monitor H&H closely.


-Await further recommendations from consultants.





The patient was seen in collaboration with Dr. Coley.


Result Diagram:  


3/8/19 1218                                                                     


          3/8/19 0540





Results 24hrs





Laboratory Tests


Test
                     3/7/19
16:20  3/7/19
16:26  3/7/19
16:28  3/7/19
18:36


POC Venous Lactate               1.5


Hemoglobin A1c                                6.8  H


Creatine Kinase                                 57


Creatine Kinase Index                          4.1


Creatinine Kinase MB                          2.33


(Mass)


Troponin I                                 1.340  *H


Thyroid Stimulating       
                 1.490  
  
             



Hormone
(TSH)


Free Thyroxine                                1.64


Iron Level                                                    35


Total Iron Binding                                           254


Capacity


Percent Iron Saturation                                      14  L


Ferritin                                                  752.0  H


Prostate Specific                                            3.8


Antigen


Bedside Glucose                                                            142


Test
                     3/7/19
18:37  3/7/19
20:54  3/7/19
22:13  3/8/19
05:37


Lactic Acid Level                1.0


Bedside Glucose                                133


Creatine Kinase                                               75


Creatine Kinase Index                                        5.3


Creatinine Kinase MB                                       3.97  H


(Mass)


Troponin I                                               1.570  *H


Hepatitis B Surface                                                 NEGATIVE


Antigen


Test
                     3/8/19
05:40  3/8/19
07:54  3/8/19
11:18  3/8/19
12:18


White Blood Count              12.2  H


Red Blood Count                1.99  L


Hemoglobin                     6.6  *L                                   6.8  *L


Hematocrit                     20.1  L                                   20.8  L


Mean Corpuscular Volume        101.0


Mean Corpuscular               33.2  H


Hemoglobin


Mean Corpuscular               32.8  
  
             
             



Hemoglobin
Concent


Red Cell Distribution          17.1  H


Width


Platelet Count                  114  L


Mean Platelet Volume             9.8


Immature Granulocytes %       1.300  H


Neutrophils %                  77.7  H


Lymphocytes %                  12.1  L


Monocytes %                      7.7


Eosinophils %                    1.0


Basophils %                      0.2


Nucleated Red Blood              0.0


Cells %


Immature Granulocytes #       0.160  H


Neutrophils #                   9.5  H


Lymphocytes #                    1.5


Monocytes #                      0.9


Eosinophils #                    0.1


Basophils #                      0.0


Nucleated Red Blood              0.0


Cells #


Sodium Level                     139


Potassium Level                  4.0


Chloride Level                   95  L


Carbon Dioxide Level              28


Anion Gap                        16  H


Blood Urea Nitrogen              53  H


Creatinine                     6.96  H


Est Glomerular Filtrat           8  L
  
             
             



Rate
mL/min


Glucose Level                     89


Calcium Level                   8.3  L


Phosphorus Level                 4.8


Magnesium Level                  2.5


Total Bilirubin                  0.3


Direct Bilirubin                0.00


Indirect Bilirubin               0.3


Aspartate Amino                  19  
  
             
             



Transf
(AST/SGOT)


Alanine                          34  
  
             
             



Aminotransferase
(ALT/SG


PT)


Alkaline Phosphatase            143  H


Creatine Kinase                   82


Creatine Kinase Index            8.1


Creatinine Kinase MB           6.64  H


(Mass)


Troponin I                   1.990  *H


Total Protein                    7.2


Albumin                          3.8


Globulin                       3.40  H


Albumin/Globulin Ratio          1.11


Triglycerides Level              134


Cholesterol Level         < 50  L


LDL Cholesterol,


Calculated


HDL Cholesterol                  17  L


Cholesterol/HDL Ratio


Bedside Glucose                                109           136








Exam/Review of Systems


Exam


Vitals





Vital Signs


  Date      Temp  Pulse  Resp  B/P (MAP)   Pulse Ox  O2          O2 Flow    FiO2


Time                                                 Delivery    Rate


    3/8/19           77


     12:16


    3/8/19  98.2           19      114/55        96


     11:29                           (74)


    3/8/19                                                                    21


     08:02


    3/8/19                                                             2.0


     01:57


    3/8/19                                           Nasal


     01:57                                           Cannula








Intake and Output





3/7/19


3/7/19


3/8/19





1414:59


22:59


06:59





IntakeIntake Total


300 ml





OutputOutput Total


200 ml





BalanceBalance


100 ml














Results


Results 24hrs





Laboratory Tests


Test
                     3/7/19
16:20  3/7/19
16:26  3/7/19
16:28  3/7/19
18:36


POC Venous Lactate               1.5


Hemoglobin A1c                                6.8  H


Creatine Kinase                                 57


Creatine Kinase Index                          4.1


Creatinine Kinase MB                          2.33


(Mass)


Troponin I                                 1.340  *H


Thyroid Stimulating       
                 1.490  
  
             



Hormone
(TSH)


Free Thyroxine                                1.64


Iron Level                                                    35


Total Iron Binding                                           254


Capacity


Percent Iron Saturation                                      14  L


Ferritin                                                  752.0  H


Prostate Specific                                            3.8


Antigen


Bedside Glucose                                                            142


Test
                     3/7/19
18:37  3/7/19
20:54  3/7/19
22:13  3/8/19
05:37


Lactic Acid Level                1.0


Bedside Glucose                                133


Creatine Kinase                                               75


Creatine Kinase Index                                        5.3


Creatinine Kinase MB                                       3.97  H


(Mass)


Troponin I                                               1.570  *H


Hepatitis B Surface                                                 NEGATIVE


Antigen


Test
                     3/8/19
05:40  3/8/19
07:54  3/8/19
11:18  3/8/19
12:18


White Blood Count              12.2  H


Red Blood Count                1.99  L


Hemoglobin                     6.6  *L                                   6.8  *L


Hematocrit                     20.1  L                                   20.8  L


Mean Corpuscular Volume        101.0


Mean Corpuscular               33.2  H


Hemoglobin


Mean Corpuscular               32.8  
  
             
             



Hemoglobin
Concent


Red Cell Distribution          17.1  H


Width


Platelet Count                  114  L


Mean Platelet Volume             9.8


Immature Granulocytes %       1.300  H


Neutrophils %                  77.7  H


Lymphocytes %                  12.1  L


Monocytes %                      7.7


Eosinophils %                    1.0


Basophils %                      0.2


Nucleated Red Blood              0.0


Cells %


Immature Granulocytes #       0.160  H


Neutrophils #                   9.5  H


Lymphocytes #                    1.5


Monocytes #                      0.9


Eosinophils #                    0.1


Basophils #                      0.0


Nucleated Red Blood              0.0


Cells #


Sodium Level                     139


Potassium Level                  4.0


Chloride Level                   95  L


Carbon Dioxide Level              28


Anion Gap                        16  H


Blood Urea Nitrogen              53  H


Creatinine                     6.96  H


Est Glomerular Filtrat           8  L
  
             
             



Rate
mL/min


Glucose Level                     89


Calcium Level                   8.3  L


Phosphorus Level                 4.8


Magnesium Level                  2.5


Total Bilirubin                  0.3


Direct Bilirubin                0.00


Indirect Bilirubin               0.3


Aspartate Amino                  19  
  
             
             



Transf
(AST/SGOT)


Alanine                          34  
  
             
             



Aminotransferase
(ALT/SG


PT)


Alkaline Phosphatase            143  H


Creatine Kinase                   82


Creatine Kinase Index            8.1


Creatinine Kinase MB           6.64  H


(Mass)


Troponin I                   1.990  *H


Total Protein                    7.2


Albumin                          3.8


Globulin                       3.40  H


Albumin/Globulin Ratio          1.11


Triglycerides Level              134


Cholesterol Level         < 50  L


LDL Cholesterol,


Calculated


HDL Cholesterol                  17  L


Cholesterol/HDL Ratio


Bedside Glucose                                109           136








Medications


Medication





Current Medications


IV Flush (NS 3 ml) 3 ml PER PROTOCOL IV ;  Start 3/7/19 at 16:00


Levalbuterol (Xopenex Neb) 0.63 mg Q6H RESP  THERAPY HHN  Last administered on 


3/8/19at 07:59; Admin Dose 0.63 MG;  Start 3/7/19 at 20:00


Insulin Aspart (Novolog Insulin Pen) NOVOLOG *MILD* ALGORITHM WITH MEALS  


BEDTIME SC ;  Start 3/7/19 at 18:00


Ascorbic Acid (Vitamin C) 500 mg DAILY PO  Last administered on 3/8/19at 08:23; 


Admin Dose 500 MG;  Start 3/8/19 at 09:00


Docusate Sodium (Colace) 200 mg HS PO  Last administered on 3/7/19at 20:55; 


Admin Dose 200 MG;  Start 3/7/19 at 21:00


Ferrous Sulfate (Ferrous Sulfate (Ec)) 325 mg DAILY PO  Last administered on 


3/8/19at 08:23; Admin Dose 325 MG;  Start 3/8/19 at 09:00


Levothyroxine Sodium (Synthroid) 50 mcg BEFORE  BREAKFAST PO  Last administered 


on 3/8/19at 06:44; Admin Dose 50 MCG;  Start 3/8/19 at 07:00


Multivitamins Therapeutic (Theragran) 1 tab DAILY PO  Last administered on 


3/8/19at 08:23; Admin Dose 1 TAB;  Start 3/8/19 at 09:00


Sevelamer Carbonate (Renvela) 0.8 gm WITH  MEALS PO  Last administered on 


3/8/19at 11:08; Admin Dose 0.8 GM;  Start 3/7/19 at 18:00


Atorvastatin Calcium (Lipitor) 40 mg QHS PO  Last administered on 3/7/19at 


20:56; Admin Dose 40 MG;  Start 3/7/19 at 21:00


Cefepime HCl 50 ml @  100 mls/hr Q24H IVPB ;  Start 3/8/19 at 12:00


Vancomycin HCl (Vanco Iv Per Pharmacy) VANCOMYCIN PER PHARMACY PER PROTOCOL XX ;


 Start 3/8/19 at 07:30


Epoetin Connor (Epogen (Esrd)) 10,000 units MoWeFr@17 SC ;  Start 3/8/19 at 17:00


Pantoprazole (Protonix Iv) 40 mg BID@06,18 IV ;  Start 3/8/19 at 12:30











JEREMY PADILLA NP                Mar 8, 2019 12:34

## 2019-03-08 NOTE — CONS
DATE OF ADMISSION: 03/07/2019

DATE OF CONSULTATION:  

 

 

 

TYPE OF CONSULTATION:  Gastroenterology.

 

Dear Dr. Coley:

 

Thank you for asking me to see Saturnino Bulmaro in GI consultation.

 

HISTORY OF PRESENT ILLNESS:  The patient, as you know, is a 68-year-old  gentleman that was a
dmitted to the hospital because of severe anemia.  He was found to have hypotension as well on admiss
ion and he was also having some fever on admission.  He is feeling better today; however, GI consulta
tion is requested because of severe anemia with hemoglobin around 6.8, hematocrit 20.0, platelet coun
t 133,000, WBC count 10,400.  Hemoglobin dropped to 6.6 today.  He has got chronic renal failure and 
his BUN is 53, creatinine 6.9, potassium 4.0.  AST is 19, ALT 34, alkaline phosphatase 143.  Serum tr
oponin is 199 today.  It was 1.5 yesterday, 1.5 on 03/07/2019 again; however it seems to be increasin
g at this moment.  From the GI standpoint, he has no abdominal pain, although whenever he goes to the
 bathroom his stomach hurts.  No nausea, no vomiting, no heartburn, no dysphagia, no significant weig
ht loss.  As mentioned earlier on, he has multiple other medical problems including chronic kidney di
sease and he is on hemodialysis, history of coronary artery disease and coronary artery bypass surger
y in the past.  He has pneumonia, diabetes, coronary artery disease, dyslipidemia and hypertension.

 

MEDICATIONS PRIOR TO ADMISSION:  Include:

1.  Ferrous sulfate.

2.  Atorvastatin.

3.  Lisinopril.

4.  Carvedilol.

5.  Aspirin.

6.  Renvela.

7.  Docusate.

8.  Zofran.

9.  Lantus.

10.  Levoxyl.

11.  Multivitamins.

 

PHYSICAL EXAMINATION:

GENERAL:  The patient is a 68-year-old  male who at this time is alert and well built.

VITAL SIGNS:  Temperature is 98.2, blood pressure is 114/55.

CARDIOVASCULAR:  Normal heart sounds.

RESPIRATORY:  Normal breath sounds.

ABDOMEN:  Unremarkable.  Surgical scar noted in the midline of the chest.

 

CLINICAL IMPRESSION:  The patient has severe anemia, although it seems to be like it is normocytic no
rmochromic anemia, but certainly one should rule out the possibility of gastrointestinal causes inclu
ding peptic ulcer disease, arteriovenous malformation of gastrointestinal tract, colorectal neoplasm,
 although anemia could be contributed by anemia of chronic disease due to chronic kidney disease.

 

PLAN:  At this time, I would recommend upper endoscopy as well as lower endoscopy after cardiac clear
ance.  As noted, he has been having increasing troponin levels.

 

Once again, Dr. Magui Coley, thank you for this consultation.

 

 

Dictated By: BUCK ABDI MD

 

NC/NTS

DD:    03/08/2019 12:03:09

DT:    03/08/2019 12:47:32

Conf#: [id number]

DID#:  [id number]

CC: MAGUI COLEY MD;*EndCC*

## 2019-03-08 NOTE — CONS
Assessment/Plan


Assessment/Plan


Hospital Course (Demo Recall)


SIRS


Ejection fraction


Borderline hypotension


Elevated troponin


Acute blood loss anemia


End-stage renal disease on hemodialysis


Diabetes


History of open heart surgery-likely CABG





-Patient with not feeling well, febrile during hemodialysis.  Patient found to 


be severely anemic, febrile with blood pressure on the lower side.  Feeling 


better today


-Patient with recurrent anemia and initially troponins were trending down and 


now trending up.  Would recommend blood transfusion ideally to maintain 


hemoglobin above 9


-No aspirin at the current time given anemia, no beta-blocker given low blood 


pressure.  Continue statin





Consultation Date/Type/Reason


Admit Date/Time


Mar 7, 2019 at 13:32


Initial Consult Date





Type of Consult


Cardiology


Date/Time of Note


DATE: 3/8/19 


TIME: 11:09





24 HR Interval Summary


Free Text/Dictation


Denies chest pain, shortness of breath





Exam/Review of Systems


Vital Signs


Vitals





Vital Signs


  Date      Temp  Pulse  Resp  B/P (MAP)   Pulse Ox  O2          O2 Flow    FiO2


Time                                                 Delivery    Rate


    3/8/19           72


     08:15


    3/8/19                 20                    97                           21


     08:02


    3/8/19  98.2               92/50 (64)


     07:35


    3/8/19                                                             2.0


     01:57


    3/8/19                                           Nasal


     01:57                                           Cannula








Intake and Output





3/7/19


3/7/19


3/8/19





1515:00


23:00


07:00





IntakeIntake Total


300 ml





OutputOutput Total


200 ml





BalanceBalance


100 ml














Exam


Constitutional:  alert, oriented


Neck:  supple


Respiratory:  other (Coarse breath sounds bilaterally, no wheezing)


Cardiovascular:  regular rate and rhythm (S1-S2 heard)


Gastrointestinal:  soft, non-tender, bowel sounds


Extremities:  other (Amputation)





Labs


Result Diagram:  


3/8/19 0540                                                                     


          3/8/19 0540





Results 24hrs





Laboratory Tests


Test
                     3/7/19
11:47  3/7/19
12:11  3/7/19
16:20  3/7/19
16:26


White Blood Count               10.4


Red Blood Count                2.01  L


Hemoglobin                     6.8  *L


Hematocrit                     20.0  L


Mean Corpuscular                99.5


Volume


Mean Corpuscular               33.8  H


Hemoglobin


Mean Corpuscular               34.0  
  
             
             



Hemoglobin
Concent


Red Cell                       16.5  H


Distribution Width


Platelet Count                  133  L


Mean Platelet                    9.8


Volume


Immature                      1.900  H


Granulocytes %


Neutrophils %                  87.7  H


Segmented                        69  
  
             
             



Neutrophils


%
(Manual)


Band Neutrophils %               23  H


(Manual)


Lymphocytes %                   5.8  L


Lymphocytes %                     5  L


(Manual)


Reactive                         1  H
  
             
             



Lymphocytes


%
(Manual)


Monocytes %                      4.3


Monocytes %                        2


(Manual)


Eosinophils %                    0.2


Basophils %                      0.1


Nucleated Red                    0.0


Blood Cells %


Immature                      0.200  H


Granulocytes #


Neutrophils #                   9.1  H


Neutrophils #                    7.4


(Manual)


Band Neutrophils #              2.3  H


Lymphocytes                     0.5  L


(Manual)


Lymphocytes #                   0.6  L


Reactive                        0.1  H


Lymphocytes #


Monocytes #                      0.5


Monocytes #                     0.2  L


(Manual)


Eosinophils #                    0.0


Basophils #                      0.0


Nucleated Red                    0.0


Blood Cells #


Pathologist           
                 
             
             



Review
(Hematology


)


Platelet Estimate   NORMAL


Polychromasia                     2+


Hypochromasia                     1+


Anisocytosis                      2+


Microcytosis                      2+


Prothrombin Time                14.8


Prothrombin Time                 1.2


Ratio


[employer]R [employer]              1.15  
  
             
             



Normalized
Ratio


Activated                      32.6  
  
             
             



Partial
Thrombopla


st Time


Path Consult        APRIL ESTRELLA,    
             
             



Signing
Pathologis  MD richmond


Sodium Level                     138


Potassium Level                 3.2  L


Chloride Level                   91  L


Carbon Dioxide                   32  H


Level


Anion Gap                        15  H


Blood Urea                       41  H


Nitrogen


Creatinine                     5.30  H


Est Glomerular                  11  L
  
             
             



Filtrat


Rate
mL/min


Glucose Level                    102


Calcium Level                    9.3


Total Bilirubin                  0.4


Direct Bilirubin                0.00


Indirect Bilirubin               0.4


Aspartate Amino                  20  
  
             
             



Transf
(AST/SGOT)


Alanine                          37  
  
             
             



Aminotransferase
(


ALT/SGPT)


Alkaline                        202  H


Phosphatase


Troponin I                   1.520  *H                                 1.340  *H


Total Protein                    8.0


Albumin                          4.4


Globulin                       3.60  H


Albumin/Globulin                1.22


Ratio


POC Venous Lactate                             1.6           1.5


Hemoglobin A1c                                                            6.8  H


Creatine Kinase                                                             57


Creatine Kinase                                                            4.1


Index


Creatinine Kinase                                                         2.33


MB (Mass)


Thyroid             
                   
             
                 1.490  



Stimulating


Hormone
(TSH)


Free Thyroxine                                                            1.64


Test
                     3/7/19
16:28  3/7/19
18:36  3/7/19
18:37  3/7/19
20:54


Iron Level                        35


Total Iron Binding               254


Capacity


Percent Iron                     14  L


Saturation


Ferritin                      752.0  H


Prostate Specific                3.8


Antigen


Bedside Glucose                                142                         133


Lactic Acid Level                                            1.0


Test
                     3/7/19
22:13  3/8/19
05:40  3/8/19
07:54  



Creatine Kinase                   75            82


Creatine Kinase                  5.3           8.1


Index


Creatinine Kinase              3.97  H       6.64  H


MB (Mass)


Troponin I                   1.570  *H     1.990  *H


White Blood Count                            12.2  H


Red Blood Count                              1.99  L


Hemoglobin                                   6.6  *L


Hematocrit                                   20.1  L


Mean Corpuscular                             101.0


Volume


Mean Corpuscular                             33.2  H


Hemoglobin


Mean Corpuscular    
                        32.8  
  
             



Hemoglobin
Concent


Red Cell                                     17.1  H


Distribution Width


Platelet Count                                114  L


Mean Platelet                                  9.8


Volume


Immature                                    1.300  H


Granulocytes %


Neutrophils %                                77.7  H


Lymphocytes %                                12.1  L


Monocytes %                                    7.7


Eosinophils %                                  1.0


Basophils %                                    0.2


Nucleated Red                                  0.0


Blood Cells %


Immature                                    0.160  H


Granulocytes #


Neutrophils #                                 9.5  H


Lymphocytes #                                  1.5


Monocytes #                                    0.9


Eosinophils #                                  0.1


Basophils #                                    0.0


Nucleated Red                                  0.0


Blood Cells #


Sodium Level                                   139


Potassium Level                                4.0


Chloride Level                                 95  L


Carbon Dioxide                                  28


Level


Anion Gap                                      16  H


Blood Urea                                     53  H


Nitrogen


Creatinine                                   6.96  H


Est Glomerular      
                          8  L
  
             



Filtrat


Rate
mL/min


Glucose Level                                   89


Calcium Level                                 8.3  L


Phosphorus Level                               4.8


Magnesium Level                                2.5


Total Bilirubin                                0.3


Direct Bilirubin                              0.00


Indirect Bilirubin                             0.3


Aspartate Amino     
                          19  
  
             



Transf
(AST/SGOT)


Alanine             
                          34  
  
             



Aminotransferase
(


ALT/SGPT)


Alkaline                                      143  H


Phosphatase


Total Protein                                  7.2


Albumin                                        3.8


Globulin                                     3.40  H


Albumin/Globulin                              1.11


Ratio


Triglycerides                                  134


Level


Cholesterol Level                       < 50  L


LDL Cholesterol,


Calculated


HDL Cholesterol                                17  L


Cholesterol/HDL


Ratio


Bedside Glucose                                              109








Medications


Medications





Current Medications


IV Flush (NS 3 ml) 3 ml PER PROTOCOL IV ;  Start 3/7/19 at 16:00


Levalbuterol (Xopenex Neb) 0.63 mg Q6H RESP  THERAPY HHN  Last administered on 


3/8/19at 07:59; Admin Dose 0.63 MG;  Start 3/7/19 at 20:00


Insulin Aspart (Novolog Insulin Pen) NOVOLOG *MILD* ALGORITHM WITH MEALS  


BEDTIME SC ;  Start 3/7/19 at 18:00


Ascorbic Acid (Vitamin C) 500 mg DAILY PO  Last administered on 3/8/19at 08:23; 


Admin Dose 500 MG;  Start 3/8/19 at 09:00


Docusate Sodium (Colace) 200 mg HS PO  Last administered on 3/7/19at 20:55; 


Admin Dose 200 MG;  Start 3/7/19 at 21:00


Ferrous Sulfate (Ferrous Sulfate (Ec)) 325 mg DAILY PO  Last administered on 


3/8/19at 08:23; Admin Dose 325 MG;  Start 3/8/19 at 09:00


Levothyroxine Sodium (Synthroid) 50 mcg BEFORE  BREAKFAST PO  Last administered 


on 3/8/19at 06:44; Admin Dose 50 MCG;  Start 3/8/19 at 07:00


Multivitamins Therapeutic (Theragran) 1 tab DAILY PO  Last administered on 


3/8/19at 08:23; Admin Dose 1 TAB;  Start 3/8/19 at 09:00


Sevelamer Carbonate (Renvela) 0.8 gm WITH  MEALS PO  Last administered on 


3/8/19at 08:24; Admin Dose 0.8 GM;  Start 3/7/19 at 18:00


Atorvastatin Calcium (Lipitor) 40 mg QHS PO  Last administered on 3/7/19at 


20:56; Admin Dose 40 MG;  Start 3/7/19 at 21:00


Cefepime HCl 50 ml @  100 mls/hr Q24H IVPB ;  Start 3/8/19 at 12:00


Vancomycin HCl (Vanco Iv Per Pharmacy) VANCOMYCIN PER PHARMACY PER PROTOCOL XX ;


 Start 3/8/19 at 07:30


Epoetin Connor (Epogen (Esrd)) 10,000 units MoWeFr@17 SC ;  Start 3/8/19 at 17:00


Vancomycin HCl 100 ml @  100 mls/hr ONCE  ONCE IVPB ;  Start 3/8/19 at 11:00;  


Stop 3/8/19 at 11:59











Steve Wesley DO            Mar 8, 2019 11:12

## 2019-03-09 VITALS — RESPIRATION RATE: 17 BRPM | DIASTOLIC BLOOD PRESSURE: 68 MMHG | HEART RATE: 77 BPM | SYSTOLIC BLOOD PRESSURE: 131 MMHG

## 2019-03-09 VITALS — HEART RATE: 71 BPM

## 2019-03-09 VITALS — HEART RATE: 66 BPM | SYSTOLIC BLOOD PRESSURE: 103 MMHG | RESPIRATION RATE: 19 BRPM | DIASTOLIC BLOOD PRESSURE: 55 MMHG

## 2019-03-09 VITALS — RESPIRATION RATE: 20 BRPM | DIASTOLIC BLOOD PRESSURE: 64 MMHG | HEART RATE: 73 BPM | SYSTOLIC BLOOD PRESSURE: 143 MMHG

## 2019-03-09 VITALS — HEART RATE: 69 BPM | SYSTOLIC BLOOD PRESSURE: 130 MMHG | DIASTOLIC BLOOD PRESSURE: 62 MMHG | RESPIRATION RATE: 20 BRPM

## 2019-03-09 VITALS — RESPIRATION RATE: 20 BRPM | SYSTOLIC BLOOD PRESSURE: 129 MMHG | DIASTOLIC BLOOD PRESSURE: 58 MMHG | HEART RATE: 68 BPM

## 2019-03-09 VITALS — SYSTOLIC BLOOD PRESSURE: 100 MMHG | HEART RATE: 69 BPM | RESPIRATION RATE: 18 BRPM | DIASTOLIC BLOOD PRESSURE: 51 MMHG

## 2019-03-09 VITALS — HEART RATE: 76 BPM

## 2019-03-09 VITALS — HEART RATE: 77 BPM

## 2019-03-09 VITALS — HEART RATE: 72 BPM

## 2019-03-09 VITALS — HEART RATE: 70 BPM

## 2019-03-09 LAB
ADD MAN DIFF?: NO
ANION GAP: 15 (ref 5–13)
BASOPHIL #: 0 10^3/UL (ref 0–0.1)
BASOPHILS %: 0.2 % (ref 0–2)
BLOOD UREA NITROGEN: 32 MG/DL (ref 7–20)
CALCIUM: 8.7 MG/DL (ref 8.4–10.2)
CARBON DIOXIDE: 28 MMOL/L (ref 21–31)
CHLORIDE: 99 MMOL/L (ref 97–110)
CK INDEX: 6.9
CK-MB: 4.98 NG/ML (ref 0–2.4)
CREATINE KINASE: 72 IU/L (ref 23–200)
CREATININE: 4.55 MG/DL (ref 0.61–1.24)
EOSINOPHILS #: 0 10^3/UL (ref 0–0.5)
EOSINOPHILS %: 0.2 % (ref 0–7)
GLUCOSE: 100 MG/DL (ref 70–220)
HEMATOCRIT: 28.1 % (ref 42–52)
HEMATOCRIT: 28.9 % (ref 42–52)
HEMOGLOBIN: 9.3 G/DL (ref 14–18)
HEMOGLOBIN: 9.5 G/DL (ref 14–18)
IMMATURE GRANS #M: 0.17 10^3/UL (ref 0–0.03)
IMMATURE GRANS % (M): 1.3 % (ref 0–0.43)
LYMPHOCYTES #: 1.4 10^3/UL (ref 0.8–2.9)
LYMPHOCYTES %: 11 % (ref 15–51)
MAGNESIUM: 2.3 MG/DL (ref 1.7–2.5)
MEAN CORPUSCULAR HEMOGLOBIN: 32.2 PG (ref 29–33)
MEAN CORPUSCULAR HGB CONC: 32.9 G/DL (ref 32–37)
MEAN CORPUSCULAR VOLUME: 98 FL (ref 82–101)
MEAN PLATELET VOLUME: 10.3 FL (ref 7.4–10.4)
MONOCYTE #: 0.9 10^3/UL (ref 0.3–0.9)
MONOCYTES %: 6.9 % (ref 0–11)
NEUTROPHIL #: 10.5 10^3/UL (ref 1.6–7.5)
NEUTROPHILS %: 80.4 % (ref 39–77)
NUCLEATED RED BLOOD CELLS #: 0 10^3/UL (ref 0–0)
NUCLEATED RED BLOOD CELLS%: 0.2 /100WBC (ref 0–0)
PHOSPHORUS: 4.5 MG/DL (ref 2.5–4.9)
PLATELET COUNT: 127 10^3/UL (ref 140–415)
POTASSIUM: 4.3 MMOL/L (ref 3.5–5.1)
RED BLOOD COUNT: 2.95 10^6/UL (ref 4.7–6.1)
RED CELL DISTRIBUTION WIDTH: 17.4 % (ref 11.5–14.5)
SODIUM: 142 MMOL/L (ref 135–144)
TROPONIN-I: 3.14 NG/ML (ref 0–0.12)
WHITE BLOOD COUNT: 13 10^3/UL (ref 4.8–10.8)

## 2019-03-09 RX ADMIN — LACTULOSE SCH GM: 10 SOLUTION ORAL at 17:19

## 2019-03-09 RX ADMIN — SEVELAMER CARBONATE SCH GM: 800 POWDER, FOR SUSPENSION ORAL at 12:08

## 2019-03-09 RX ADMIN — PANTOPRAZOLE SODIUM 1 MG: 40 INJECTION, POWDER, FOR SOLUTION INTRAVENOUS at 05:24

## 2019-03-09 RX ADMIN — INSULIN ASPART 1 UNIT: 100 INJECTION, SOLUTION INTRAVENOUS; SUBCUTANEOUS at 16:34

## 2019-03-09 RX ADMIN — ATORVASTATIN CALCIUM SCH MG: 40 TABLET, FILM COATED ORAL at 20:19

## 2019-03-09 RX ADMIN — LEVALBUTEROL HYDROCHLORIDE 1 MG: 0.63 SOLUTION RESPIRATORY (INHALATION) at 07:34

## 2019-03-09 RX ADMIN — LACTULOSE SCH GM: 10 SOLUTION ORAL at 20:19

## 2019-03-09 RX ADMIN — INSULIN ASPART 1 UNIT: 100 INJECTION, SOLUTION INTRAVENOUS; SUBCUTANEOUS at 20:18

## 2019-03-09 RX ADMIN — SEVELAMER CARBONATE SCH GM: 800 POWDER, FOR SUSPENSION ORAL at 17:19

## 2019-03-09 RX ADMIN — LEVALBUTEROL HYDROCHLORIDE 1 MG: 0.63 SOLUTION RESPIRATORY (INHALATION) at 01:26

## 2019-03-09 RX ADMIN — OXYCODONE HYDROCHLORIDE AND ACETAMINOPHEN 1 MG: 500 TABLET ORAL at 08:30

## 2019-03-09 RX ADMIN — CEFEPIME 1 MLS/HR: 1 INJECTION, POWDER, FOR SOLUTION INTRAMUSCULAR; INTRAVENOUS at 12:08

## 2019-03-09 RX ADMIN — OXYCODONE HYDROCHLORIDE AND ACETAMINOPHEN SCH MG: 500 TABLET ORAL at 08:30

## 2019-03-09 RX ADMIN — PANTOPRAZOLE SODIUM 1 MG: 40 INJECTION, POWDER, FOR SOLUTION INTRAVENOUS at 17:19

## 2019-03-09 RX ADMIN — FERROUS SULFATE TAB 325 MG (65 MG ELEMENTAL FE) SCH MG: 325 (65 FE) TAB at 08:30

## 2019-03-09 RX ADMIN — SEVELAMER CARBONATE 1 GM: 800 POWDER, FOR SUSPENSION ORAL at 08:30

## 2019-03-09 RX ADMIN — LACTULOSE 1 GM: 20 SOLUTION ORAL at 17:19

## 2019-03-09 RX ADMIN — LEVALBUTEROL HYDROCHLORIDE 1 MG: 0.63 SOLUTION RESPIRATORY (INHALATION) at 13:13

## 2019-03-09 RX ADMIN — INSULIN ASPART 1 UNIT: 100 INJECTION, SOLUTION INTRAVENOUS; SUBCUTANEOUS at 08:00

## 2019-03-09 RX ADMIN — SEVELAMER CARBONATE 1 GM: 800 POWDER, FOR SUSPENSION ORAL at 17:19

## 2019-03-09 RX ADMIN — THERA TABS SCH TAB: TAB at 08:30

## 2019-03-09 RX ADMIN — LACTULOSE 1 GM: 20 SOLUTION ORAL at 12:08

## 2019-03-09 RX ADMIN — SEVELAMER CARBONATE 1 GM: 800 POWDER, FOR SUSPENSION ORAL at 12:08

## 2019-03-09 RX ADMIN — SEVELAMER CARBONATE SCH GM: 800 POWDER, FOR SUSPENSION ORAL at 08:30

## 2019-03-09 RX ADMIN — FERROUS SULFATE TAB 325 MG (65 MG ELEMENTAL FE) 1 MG: 325 (65 FE) TAB at 08:30

## 2019-03-09 RX ADMIN — DOCUSATE SODIUM SCH MG: 100 CAPSULE, LIQUID FILLED ORAL at 20:19

## 2019-03-09 RX ADMIN — LEVALBUTEROL HYDROCHLORIDE 1 MG: 0.63 SOLUTION RESPIRATORY (INHALATION) at 20:30

## 2019-03-09 RX ADMIN — CEFEPIME SCH MLS/HR: 1 INJECTION, POWDER, FOR SOLUTION INTRAMUSCULAR; INTRAVENOUS at 12:08

## 2019-03-09 RX ADMIN — THERA TABS 1 TAB: TAB at 08:30

## 2019-03-09 RX ADMIN — LACTULOSE 1 GM: 20 SOLUTION ORAL at 20:19

## 2019-03-09 RX ADMIN — LEVALBUTEROL HYDROCHLORIDE SCH MG: 0.63 SOLUTION RESPIRATORY (INHALATION) at 13:13

## 2019-03-09 RX ADMIN — LEVOTHYROXINE SODIUM SCH MCG: 50 TABLET ORAL at 05:24

## 2019-03-09 RX ADMIN — LEVOTHYROXINE SODIUM 1 MCG: 50 TABLET ORAL at 05:24

## 2019-03-09 RX ADMIN — ATORVASTATIN CALCIUM 1 MG: 40 TABLET, FILM COATED ORAL at 20:19

## 2019-03-09 RX ADMIN — LEVALBUTEROL HYDROCHLORIDE SCH MG: 0.63 SOLUTION RESPIRATORY (INHALATION) at 01:26

## 2019-03-09 RX ADMIN — LACTULOSE SCH GM: 10 SOLUTION ORAL at 12:08

## 2019-03-09 RX ADMIN — DOCUSATE SODIUM 1 MG: 100 CAPSULE, LIQUID FILLED ORAL at 20:19

## 2019-03-09 RX ADMIN — INSULIN ASPART 1 UNIT: 100 INJECTION, SOLUTION INTRAVENOUS; SUBCUTANEOUS at 12:00

## 2019-03-09 RX ADMIN — PANTOPRAZOLE SODIUM SCH MG: 40 INJECTION, POWDER, FOR SOLUTION INTRAVENOUS at 17:19

## 2019-03-09 RX ADMIN — PANTOPRAZOLE SODIUM SCH MG: 40 INJECTION, POWDER, FOR SOLUTION INTRAVENOUS at 05:24

## 2019-03-09 RX ADMIN — LEVALBUTEROL HYDROCHLORIDE SCH MG: 0.63 SOLUTION RESPIRATORY (INHALATION) at 20:30

## 2019-03-09 RX ADMIN — LEVALBUTEROL HYDROCHLORIDE SCH MG: 0.63 SOLUTION RESPIRATORY (INHALATION) at 07:34

## 2019-03-09 NOTE — RADRPT
Vent Rate: 69 bpm

RR Interval: 0 msec

AZ Interval: 194 msec

QRS Duration: 122 msec

QT Interval: 488 msec

QTC Interval: 522 msec

P-R-T Axis: 40 - 1 - 113 degrees

 

 Normal sinus rhythm with sinus arrhythmia

 Nonspecific intraventricular conduction delay

 ST  & T wave abnormality, consider anterolateral ischemia

 Abnormal ECG

 

Electronically Signed By: Daniel Kc

## 2019-03-09 NOTE — CONS
Assessment/Plan


Assessment/Plan


Hospital Course (Demo Recall)


67 yo with multiple medical problems presenting with weakness and severe anemia,


found to have abnormal troponin which is continuing to rise, in spite of lack of


patient symptoms.





Impression:


Elevated troponin, type 2 MI versus active ischemia secondary to acute medical 


issues


Acute blood loss anemia


End-stage renal disease on hemodialysis


Diabetes


History of open heart surgery-likely CABG, done 2 years ago, unknown hospital





Recommendations:


I've been asked to clear Mr. Chamberlain for EGD.  Unfortunately, with elevated 


troponins that are in fact rising, Mr. Chamberlain is at increased risk of 


perioperative cardiovascular complications.  That said, if EGD is felt emergent,


would proceed recognizing the risk


Continue statin therapy, transfusions to keep hgb greater than 9


Consider ischemic workup, stress testing versus coronary angiography


No ASA due to anemia


As blood pressure has improved, is reasonable to restart beta blockade.  Patient


was on carvedilol 6.25 mg po bid, will restart this with hold parameters in 


place.





Consultation Date/Type/Reason


Admit Date/Time


Mar 7, 2019 at 13:32


Initial Consult Date





Type of Consult


Cardiology


Date/Time of Note


DATE: 3/9/19 


TIME: 14:30





24 HR Interval Summary


Free Text/Dictation


At present no chest pain, some dyspnea and minimal cough.  Patient seen with 


.





Exam/Review of Systems


Vital Signs


Vitals





Vital Signs


  Date      Temp  Pulse  Resp  B/P (MAP)   Pulse Ox  O2          O2 Flow    FiO2


Time                                                 Delivery    Rate


    3/9/19           76    18                    98                           21


     13:13


    3/9/19  98.3                   129/58            Room Air


     11:58                           (81)


    3/9/19                                                             2.0


     08:00








Intake and Output





3/8/19


3/8/19


3/9/19





1515:00


23:00


07:00





IntakeIntake Total


100 ml


950 ml


400 ml





OutputOutput Total


200 ml


2500 ml


100 ml





BalanceBalance


-100 ml


-1550 ml


300 ml














Exam


Constitutional:  alert, oriented


Psych:  no complaints, nl mood/affect


Head:  normocephalic, atraumatic


Eyes:  nl conjunctiva, nl lids


ENMT:  nl external ears & nose


Neck:  No jvd, No bruits


Respiratory:  clear to auscultation, normal air movement


Cardiovascular:  regular rate and rhythm; 


   No nl pulses (absent left DP pulse)


Gastrointestinal:  soft, nl liver, spleen, non-tender


Musculoskeletal:  nl extremities to inspection


Extremities:  other (right bka); 


   No edema


Neurological:  nl mental status, nl speech


Skin:  nl turgor





Labs


Result Diagram:  


3/9/19 0541                                                                     


          3/9/19 0541





Results 24hrs





Laboratory Tests


Test
                     3/8/19
18:13  3/8/19
19:43  3/8/19
20:33  3/9/19
00:28


Bedside Glucose                   85                         108


Hemoglobin                                  10.0  #L                      9.3  L


Hematocrit                                  30.2  #L                     28.1  L


Test
                     3/9/19
05:41  3/9/19
07:43  3/9/19
11:55  



White Blood Count              13.0  H


Red Blood Count               2.95  #L


Hemoglobin                      9.5  L


Hematocrit                     28.9  L


Mean Corpuscular Volume         98.0


Mean Corpuscular                32.2


Hemoglobin


Mean Corpuscular               32.9  
  
             
             



Hemoglobin
Concent


Red Cell Distribution          17.4  H


Width


Platelet Count                  127  L


Mean Platelet Volume            10.3


Immature Granulocytes %       1.300  H


Neutrophils %                  80.4  H


Lymphocytes %                  11.0  L


Monocytes %                      6.9


Eosinophils %                    0.2


Basophils %                      0.2


Nucleated Red Blood             0.2  H


Cells %


Immature Granulocytes #       0.170  H


Neutrophils #                  10.5  H


Lymphocytes #                    1.4


Monocytes #                      0.9


Eosinophils #                    0.0


Basophils #                      0.0


Nucleated Red Blood              0.0


Cells #


Sodium Level                     142


Potassium Level                  4.3


Chloride Level                    99


Carbon Dioxide Level              28


Anion Gap                        15  H


Blood Urea Nitrogen             32  #H


Creatinine                    4.55  #H


Est Glomerular Filtrat          13  L
  
             
             



Rate
mL/min


Glucose Level                    100


Calcium Level                    8.7


Phosphorus Level                 4.5


Magnesium Level                  2.3


Creatine Kinase                   72


Creatine Kinase Index            6.9


Creatinine Kinase MB           4.98  H


(Mass)


Troponin I                   3.140  *H


Bedside Glucose                                 95           132








Medications


Medications





Current Medications


IV Flush (NS 3 ml) 3 ml PER PROTOCOL IV ;  Start 3/7/19 at 16:00


Levalbuterol (Xopenex Neb) 0.63 mg Q6H RESP  THERAPY HHN  Last administered on 


3/9/19at 13:13; Admin Dose 0.63 MG;  Start 3/7/19 at 20:00


Insulin Aspart (Novolog Insulin Pen) NOVOLOG *MILD* ALGORITHM WITH MEALS  


BEDTIME SC ;  Start 3/7/19 at 18:00


Ascorbic Acid (Vitamin C) 500 mg DAILY PO  Last administered on 3/9/19at 08:30; 


Admin Dose 500 MG;  Start 3/8/19 at 09:00


Docusate Sodium (Colace) 200 mg HS PO  Last administered on 3/8/19at 20:36; 


Admin Dose 200 MG;  Start 3/7/19 at 21:00


Ferrous Sulfate (Ferrous Sulfate (Ec)) 325 mg DAILY PO  Last administered on 


3/9/19at 08:30; Admin Dose 325 MG;  Start 3/8/19 at 09:00


Levothyroxine Sodium (Synthroid) 50 mcg BEFORE  BREAKFAST PO  Last administered 


on 3/9/19at 05:24; Admin Dose 50 MCG;  Start 3/8/19 at 07:00


Multivitamins Therapeutic (Theragran) 1 tab DAILY PO  Last administered on 


3/9/19at 08:30; Admin Dose 1 TAB;  Start 3/8/19 at 09:00


Sevelamer Carbonate (Renvela) 0.8 gm WITH  MEALS PO  Last administered on 


3/9/19at 12:08; Admin Dose 0.8 GM;  Start 3/7/19 at 18:00


Atorvastatin Calcium (Lipitor) 40 mg QHS PO  Last administered on 3/8/19at 


20:36; Admin Dose 40 MG;  Start 3/7/19 at 21:00


Cefepime HCl 50 ml @  100 mls/hr Q24H IVPB  Last administered on 3/9/19at 12:08;


Admin Dose 100 MLS/HR;  Start 3/8/19 at 12:00


Vancomycin HCl (Vanco Iv Per Pharmacy) VANCOMYCIN PER PHARMACY PER PROTOCOL XX ;


 Start 3/8/19 at 07:30


Epoetin Connor (Epogen (Esrd)) 10,000 units MoWeFr@17 SC  Last administered on 


3/8/19at 18:18; Admin Dose 10,000 UNITS;  Start 3/8/19 at 17:00


Pantoprazole (Protonix Iv) 40 mg BID@06,18 IV  Last administered on 3/9/19at 


05:24; Admin Dose 40 MG;  Start 3/8/19 at 12:30


Lactulose (Enulose) 20 gm Q4 PO  Last administered on 3/9/19at 12:08; Admin Dose


20 GM;  Start 3/9/19 at 13:00


Miscellaneous Information (*Rx Drug Level Order Reminder*) VANCO RANDOM 3/ 10 @ 


0,500 ONCE  ONCE XX ;  Start 3/10/19 at 05:00;  Stop 3/10/19 at 05:01











ZACARIAS LANG              Mar 9, 2019 14:38

## 2019-03-09 NOTE — CONS
DATE OF ADMISSION: 03/07/2019

DATE OF CONSULTATION:  

 

 

 

HISTORY OF PRESENT ILLNESS:  The patient was seen by me because at the request of the hospitalist bec
ause of the anemia.  Hemoglobin was 6.6 initially, _____ is 9.5.  No active bleeding.  However, he de
veloped evidence of high troponin level of 3.1, at one point it was 1.9, that means has been trending
 upwards.

 

PHYSICAL EXAMINATION:

GENERAL:  The patient is alert.

VITAL SIGNS:  He is afebrile.  Blood pressure 143/64, pulse is 72.

CARDIOVASCULAR:  Normal heart sounds.

RESPIRATORY:  Normal breath sounds.

ABDOMEN:  Unremarkable.

 

CLINICAL IMPRESSION:  The patient has severe anemia.  He also has a high troponin level and upon disc
ussion with the hospitalist, it is clear that no acute cardiac event has occurred and endoscopy will 
be performed understanding that patient could be high risk candidate as well.

 

 

Dictated By: BUCK NEGRETE/SEJAL

DD:    03/09/2019 10:35:45

DT:    03/09/2019 15:24:07

Conf#: [id number]

DID#:  [id number]

CC: STEPHANE SNEA MD;*EndCC*

## 2019-03-09 NOTE — PN
Date/Time of Note


Date/Time of Note


DATE: 3/9/19 


TIME: 07:44





Assessment/Plan


VTE Prophylaxis


Risk score (from [facility]0 risk:  4


SCD applied (from [facility]  No


SCD contraindicated:  other


Pharmacological prophylaxis:  NA/contraindicated


Pharm contraindication:  anticoag not tolerated





Lines/Catheters


IV Catheter Type (from [facility]):  permacath


Urinary Cath still in place:  No





Assessment/Plan


Hospital Course


SUBJECTIVE: Denies any complaints.





OBJECTIVE:


Physical Exam


General: Adequately build 68 year-old male lying in bed in no apparent distress.


HEENT: Normocephalic, atraumatic. Eyes: Anicteric sclerae, conjunctivae clear. 


ENT: Nasal septum is midline, oral mucosa is dry. Neck supple, no JVD noticed.


Respiratory: Bilaterally diminished breath sounds. No use of accessory muscles 


of respiration. No adventitious breath sounds.


Cardiovascular: S1, S2 heard.  Regular rate and rhythm.


Abdomen: Soft, nontender, and nondistended. Bowel sounds positive in all 4 


quadrants.


Genitourinary: Deferred.


Extremities: No cyanosis, no clubbing, no edema.  Right lower extremity below-


knee amputation.


Neurologic: The patient is awake and alert.  Oriented x1-2.


Skin: Normal skin turgor. No skin rashes.





Labs & Vitals per chart





ASSESSMENT & PLAN 





68-year-old male with comorbidities including end-stage renal disease on 


hemodialysis Tuesdays/Thursdays/Saturdays, hypertension, diabetes mellitus, 


anemia, prostatic hypertrophy, hypothyroidism, and CAD status post coronary 


artery bypass grafting.  The patient was brought in from a skilled nursing 


facility because of fevers and diaphoresis with evidence of hypoxia at the 


skilled nursing facility.  The patient was noticed to have elevated troponins, 


anemia, and left sided infiltrate on chest x-ray in the emergency room.  The 


patient was admitted to inpatient setting for further treatment and evaluation.





1. NSTEMI.


-Etiology unclear.  


-Prior history of CAD.  Unable to start aspirin because of underlying anemia.


-Cardiology following.  


-2D echocardiogram showing preserved left ventricular ejection fraction.





2.  Healthcare associated pneumonia.


-Continue antimicrobials





3.  Acute respiratory failure.


-Hypoxic


-Most probably secondary to #2.


-Continue supplemental oxygen.


-Continue inhaled bronchodilators.


-CTA negative for any PE.





4.  Normocytic anemia.


-Etiology unclear.


-Transfuse blood products.  


-Obtain stool for OB.


-Gastroenterology consult obtained.


-Continue PPI.





5.  Acute encephalopathy.


-Unknown baseline mental status


-Most probably toxic metabolic in origin.


-Brain CT scan negative.





6.  End-stage renal disease on hemodialysis


-Nephrology following.





7.  Diabetes mellitus.


-Continue the patient on sliding scale insulin along with pre-meal insulin and 


basal insulin.


-Hemoglobin A1c 6.8.





8.  History of CAD.


-Hold aspirin because of anemia.


-Continue statins.


-Cardiology follow-up.





9.  Hypothyroidism.


-Continue Synthroid.





10.  Dyslipidemia.


-Continue statins.





11.  Hypertension.


-Defer resumption of antihypertensives to cardiology.





12. Pulmonary hypertension.


-PA systolic pressure of 51mm Hg.


-Continue supplemental O2.





12.  Fluids, electrolytes, and nutrition.


-Renal, carbohydrate controlled diet.





13.  DVT prophylaxis.


-SCD to the left lower extremity.





15.  Plan.


-Transfuse blood products as needed.


-Monitor H&H closely.


-Await further recommendations from consultants.





The patient was seen in collaboration with Dr. Coley.


Result Diagram:  


3/9/19 0541                                                                     


          3/9/19 0541





Results 24hrs





Laboratory Tests


Test
                     3/8/19
07:54  3/8/19
11:18  3/8/19
12:18  3/8/19
18:13


Bedside Glucose                  109           136                          85


Hemoglobin                                                 6.8  *L


Hematocrit                                                 20.8  L


Test
                     3/8/19
19:43  3/8/19
20:33  3/9/19
00:28  3/9/19
05:41


Hemoglobin                    10.0  #L                      9.3  L        9.5  L


Hematocrit                    30.2  #L                     28.1  L       28.9  L


Bedside Glucose                                108


White Blood Count                                                        13.0  H


Red Blood Count                                                         2.95  #L


Mean Corpuscular Volume                                                   98.0


Mean Corpuscular                                                          32.2


Hemoglobin


Mean Corpuscular          
             
             
                  32.9  



Hemoglobin
Concent


Red Cell Distribution                                                    17.4  H


Width


Platelet Count                                                            127  L


Mean Platelet Volume                                                      10.3


Immature Granulocytes %                                                 1.300  H


Neutrophils %                                                            80.4  H


Lymphocytes %                                                            11.0  L


Monocytes %                                                                6.9


Eosinophils %                                                              0.2


Basophils %                                                                0.2


Nucleated Red Blood                                                       0.2  H


Cells %


Immature Granulocytes #                                                 0.170  H


Neutrophils #                                                            10.5  H


Lymphocytes #                                                              1.4


Monocytes #                                                                0.9


Eosinophils #                                                              0.0


Basophils #                                                                0.0


Nucleated Red Blood                                                        0.0


Cells #


Sodium Level                                                               142


Potassium Level                                                            4.3


Chloride Level                                                              99


Carbon Dioxide Level                                                        28


Anion Gap                                                                  15  H


Blood Urea Nitrogen                                                       32  #H


Creatinine                                                              4.55  #H


Est Glomerular Filtrat    
             
             
                   13  L



Rate
mL/min


Glucose Level                                                              100


Calcium Level                                                              8.7


Phosphorus Level                                                           4.5


Magnesium Level                                                            2.3


Creatine Kinase                                                             72


Creatine Kinase Index                                                      6.9


Creatinine Kinase MB                                                     4.98  H


(Mass)


Troponin I                                                             3.140  *H








Exam/Review of Systems


Exam


Vitals





Vital Signs


  Date      Temp  Pulse  Resp  B/P (MAP)   Pulse Ox  O2          O2 Flow    FiO2


Time                                                 Delivery    Rate


    3/9/19                                                             2.0


     07:36


    3/9/19           72    18                    90                           21


     07:34


    3/9/19  98.6                   143/64            Room Air


     07:34                           (90)








Intake and Output





3/8/19


3/8/19


3/9/19





1515:00


23:00


07:00





IntakeIntake Total


100 ml


950 ml


400 ml





OutputOutput Total


200 ml


2500 ml


100 ml





BalanceBalance


-100 ml


-1550 ml


300 ml














Results


Results 24hrs





Laboratory Tests


Test
                     3/8/19
07:54  3/8/19
11:18  3/8/19
12:18  3/8/19
18:13


Bedside Glucose                  109           136                          85


Hemoglobin                                                 6.8  *L


Hematocrit                                                 20.8  L


Test
                     3/8/19
19:43  3/8/19
20:33  3/9/19
00:28  3/9/19
05:41


Hemoglobin                    10.0  #L                      9.3  L        9.5  L


Hematocrit                    30.2  #L                     28.1  L       28.9  L


Bedside Glucose                                108


White Blood Count                                                        13.0  H


Red Blood Count                                                         2.95  #L


Mean Corpuscular Volume                                                   98.0


Mean Corpuscular                                                          32.2


Hemoglobin


Mean Corpuscular          
             
             
                  32.9  



Hemoglobin
Concent


Red Cell Distribution                                                    17.4  H


Width


Platelet Count                                                            127  L


Mean Platelet Volume                                                      10.3


Immature Granulocytes %                                                 1.300  H


Neutrophils %                                                            80.4  H


Lymphocytes %                                                            11.0  L


Monocytes %                                                                6.9


Eosinophils %                                                              0.2


Basophils %                                                                0.2


Nucleated Red Blood                                                       0.2  H


Cells %


Immature Granulocytes #                                                 0.170  H


Neutrophils #                                                            10.5  H


Lymphocytes #                                                              1.4


Monocytes #                                                                0.9


Eosinophils #                                                              0.0


Basophils #                                                                0.0


Nucleated Red Blood                                                        0.0


Cells #


Sodium Level                                                               142


Potassium Level                                                            4.3


Chloride Level                                                              99


Carbon Dioxide Level                                                        28


Anion Gap                                                                  15  H


Blood Urea Nitrogen                                                       32  #H


Creatinine                                                              4.55  #H


Est Glomerular Filtrat    
             
             
                   13  L



Rate
mL/min


Glucose Level                                                              100


Calcium Level                                                              8.7


Phosphorus Level                                                           4.5


Magnesium Level                                                            2.3


Creatine Kinase                                                             72


Creatine Kinase Index                                                      6.9


Creatinine Kinase MB                                                     4.98  H


(Mass)


Troponin I                                                             3.140  *H








Medications


Medication





Current Medications


IV Flush (NS 3 ml) 3 ml PER PROTOCOL IV ;  Start 3/7/19 at 16:00


Levalbuterol (Xopenex Neb) 0.63 mg Q6H RESP  THERAPY HHN  Last administered on 


3/9/19at 07:34; Admin Dose 0.63 MG;  Start 3/7/19 at 20:00


Insulin Aspart (Novolog Insulin Pen) NOVOLOG *MILD* ALGORITHM WITH MEALS  


BEDTIME SC ;  Start 3/7/19 at 18:00


Ascorbic Acid (Vitamin C) 500 mg DAILY PO  Last administered on 3/8/19at 08:23; 


Admin Dose 500 MG;  Start 3/8/19 at 09:00


Docusate Sodium (Colace) 200 mg HS PO  Last administered on 3/8/19at 20:36; 


Admin Dose 200 MG;  Start 3/7/19 at 21:00


Ferrous Sulfate (Ferrous Sulfate (Ec)) 325 mg DAILY PO  Last administered on 


3/8/19at 08:23; Admin Dose 325 MG;  Start 3/8/19 at 09:00


Levothyroxine Sodium (Synthroid) 50 mcg BEFORE  BREAKFAST PO  Last administered 


on 3/9/19at 05:24; Admin Dose 50 MCG;  Start 3/8/19 at 07:00


Multivitamins Therapeutic (Theragran) 1 tab DAILY PO  Last administered on 


3/8/19at 08:23; Admin Dose 1 TAB;  Start 3/8/19 at 09:00


Sevelamer Carbonate (Renvela) 0.8 gm WITH  MEALS PO  Last administered on 3/8/


19at 11:08; Admin Dose 0.8 GM;  Start 3/7/19 at 18:00


Atorvastatin Calcium (Lipitor) 40 mg QHS PO  Last administered on 3/8/19at 


20:36; Admin Dose 40 MG;  Start 3/7/19 at 21:00


Cefepime HCl 50 ml @  100 mls/hr Q24H IVPB  Last administered on 3/8/19at 18:16;


Admin Dose 100 MLS/HR;  Start 3/8/19 at 12:00


Vancomycin HCl (Vanco Iv Per Pharmacy) VANCOMYCIN PER PHARMACY PER PROTOCOL XX ;


 Start 3/8/19 at 07:30


Epoetin Connor (Epogen (Esrd)) 10,000 units MoWeFr@17 SC  Last administered on 


3/8/19at 18:18; Admin Dose 10,000 UNITS;  Start 3/8/19 at 17:00


Pantoprazole (Protonix Iv) 40 mg BID@06,18 IV  Last administered on 3/9/19at 


05:24; Admin Dose 40 MG;  Start 3/8/19 at 12:30











JEREMY PADILLA NP                Mar 9, 2019 07:44

## 2019-03-09 NOTE — PN
DATE:  03/09/2019

 

 

SUBJECTIVE:  The patient is stable.  No events overnight.

 

OBJECTIVE:

VITAL SIGNS:  Blood pressure is 143/64, pulse 76, respiration 18, temperature 98.6.

HEENT:  Head is normocephalic.

NECK:  Supple.

HEART:  Regular rate.

LUNGS:  Show diminished breath sounds at the base.

ABDOMEN:  Soft, nontender to palpation without rebound or guarding.

EXTREMITIES:  Negative for clubbing, cyanosis, no edema.

DERMATOLOGIC:  No rashes.

MUSCULOSKELETAL:  No joint effusion.

NEUROLOGIC:  No change in exam.

 

MEDICATIONS:  Reviewed.

 

LABORATORY DATA:  From 03/09/2019 was reviewed.

 

ASSESSMENT AND PLAN:

1.  End-stage renal disease.  The patient had hemodialysis yesterday, currently off schedule.  Plan f
or dialysis tomorrow.

2.  Anemia.  The patient is status post blood transfusion.  Continue to monitor H and H levels.  Cont
inue Epogen.  The patient is pending a possible endoscopy once clinically stable.

3.  Mineral bone disorder.  Monitor calcium and phosphorus levels.

4.  Hyperkalemia.  Continue to monitor.

5.  Acute respiratory failure secondary to pneumonia, sepsis.  Continue current antibiotic regimen.  
Continue supplemental oxygen, bronchodilators.

6.  Elevated troponin.  Continue to monitor.  Possible demand ischemia.  Follow up with cardiology.

7.  Acute encephalopathy.  Etiology is toxic metabolic.

8.  Diabetes.  Continue current insulin regimen.

9.  History of coronary artery disease.  Continue medical management.

10.  Hyperthyroidism.  Continue Synthroid.

11.  Dyslipidemia.  Continue status therapy.

12.  Hypertension.  Continue current blood pressure regimen.

 

 

Dictated By: ELODIA RAHMAN DO

 

NR/NTS

DD:    03/09/2019 08:55:47

DT:    03/09/2019 11:45:56

Conf#: [id number]

DID#:  [id number]

CC: STEPHANE SENA MD;*EndCC*

## 2019-03-10 VITALS — HEART RATE: 68 BPM

## 2019-03-10 VITALS — HEART RATE: 75 BPM | SYSTOLIC BLOOD PRESSURE: 148 MMHG | DIASTOLIC BLOOD PRESSURE: 52 MMHG

## 2019-03-10 VITALS — HEART RATE: 78 BPM | SYSTOLIC BLOOD PRESSURE: 172 MMHG | DIASTOLIC BLOOD PRESSURE: 45 MMHG

## 2019-03-10 VITALS — SYSTOLIC BLOOD PRESSURE: 97 MMHG | DIASTOLIC BLOOD PRESSURE: 55 MMHG | HEART RATE: 77 BPM | RESPIRATION RATE: 19 BRPM

## 2019-03-10 VITALS — HEART RATE: 68 BPM | DIASTOLIC BLOOD PRESSURE: 48 MMHG | SYSTOLIC BLOOD PRESSURE: 155 MMHG

## 2019-03-10 VITALS — HEART RATE: 74 BPM | DIASTOLIC BLOOD PRESSURE: 50 MMHG | SYSTOLIC BLOOD PRESSURE: 169 MMHG

## 2019-03-10 VITALS — RESPIRATION RATE: 20 BRPM | HEART RATE: 67 BPM | SYSTOLIC BLOOD PRESSURE: 111 MMHG | DIASTOLIC BLOOD PRESSURE: 29 MMHG

## 2019-03-10 VITALS — SYSTOLIC BLOOD PRESSURE: 98 MMHG | RESPIRATION RATE: 18 BRPM | DIASTOLIC BLOOD PRESSURE: 51 MMHG | HEART RATE: 74 BPM

## 2019-03-10 VITALS — HEART RATE: 76 BPM | DIASTOLIC BLOOD PRESSURE: 46 MMHG | SYSTOLIC BLOOD PRESSURE: 120 MMHG | RESPIRATION RATE: 20 BRPM

## 2019-03-10 VITALS — DIASTOLIC BLOOD PRESSURE: 72 MMHG | HEART RATE: 69 BPM | SYSTOLIC BLOOD PRESSURE: 120 MMHG | RESPIRATION RATE: 21 BRPM

## 2019-03-10 VITALS — HEART RATE: 79 BPM | SYSTOLIC BLOOD PRESSURE: 170 MMHG | DIASTOLIC BLOOD PRESSURE: 80 MMHG

## 2019-03-10 VITALS — HEART RATE: 69 BPM

## 2019-03-10 VITALS — DIASTOLIC BLOOD PRESSURE: 149 MMHG | HEART RATE: 96 BPM | SYSTOLIC BLOOD PRESSURE: 181 MMHG

## 2019-03-10 VITALS — HEART RATE: 85 BPM | SYSTOLIC BLOOD PRESSURE: 172 MMHG | DIASTOLIC BLOOD PRESSURE: 100 MMHG

## 2019-03-10 VITALS — HEART RATE: 73 BPM | DIASTOLIC BLOOD PRESSURE: 129 MMHG | SYSTOLIC BLOOD PRESSURE: 159 MMHG

## 2019-03-10 VITALS — DIASTOLIC BLOOD PRESSURE: 32 MMHG | HEART RATE: 86 BPM | SYSTOLIC BLOOD PRESSURE: 86 MMHG

## 2019-03-10 VITALS — DIASTOLIC BLOOD PRESSURE: 87 MMHG | SYSTOLIC BLOOD PRESSURE: 123 MMHG | RESPIRATION RATE: 20 BRPM | HEART RATE: 82 BPM

## 2019-03-10 VITALS — HEART RATE: 66 BPM | SYSTOLIC BLOOD PRESSURE: 161 MMHG | DIASTOLIC BLOOD PRESSURE: 37 MMHG

## 2019-03-10 VITALS — DIASTOLIC BLOOD PRESSURE: 37 MMHG | HEART RATE: 68 BPM | RESPIRATION RATE: 18 BRPM | SYSTOLIC BLOOD PRESSURE: 141 MMHG

## 2019-03-10 VITALS — HEART RATE: 66 BPM

## 2019-03-10 VITALS — SYSTOLIC BLOOD PRESSURE: 141 MMHG | DIASTOLIC BLOOD PRESSURE: 40 MMHG | HEART RATE: 66 BPM

## 2019-03-10 VITALS — SYSTOLIC BLOOD PRESSURE: 148 MMHG | DIASTOLIC BLOOD PRESSURE: 37 MMHG | HEART RATE: 73 BPM | RESPIRATION RATE: 18 BRPM

## 2019-03-10 VITALS — HEART RATE: 79 BPM

## 2019-03-10 VITALS — HEART RATE: 67 BPM

## 2019-03-10 VITALS — HEART RATE: 81 BPM

## 2019-03-10 VITALS — HEART RATE: 67 BPM | DIASTOLIC BLOOD PRESSURE: 55 MMHG | SYSTOLIC BLOOD PRESSURE: 126 MMHG

## 2019-03-10 LAB
ADD MAN DIFF?: NO
AMYLASE: 80 U/L (ref 11–123)
ANION GAP: 16 (ref 5–13)
BASOPHIL #: 0 10^3/UL (ref 0–0.1)
BASOPHILS %: 0.1 % (ref 0–2)
BLOOD UREA NITROGEN: 49 MG/DL (ref 7–20)
CALCIUM: 8.6 MG/DL (ref 8.4–10.2)
CARBON DIOXIDE: 26 MMOL/L (ref 21–31)
CHLORIDE: 101 MMOL/L (ref 97–110)
CK INDEX: 8.6
CK-MB: 3.28 NG/ML (ref 0–2.4)
CREATINE KINASE: 38 IU/L (ref 23–200)
CREATININE: 6.29 MG/DL (ref 0.61–1.24)
EOSINOPHILS #: 0.2 10^3/UL (ref 0–0.5)
EOSINOPHILS %: 1.8 % (ref 0–7)
GLUCOSE: 130 MG/DL (ref 70–220)
HEMATOCRIT: 28.7 % (ref 42–52)
HEMOGLOBIN: 9.5 G/DL (ref 14–18)
IMMATURE GRANS #M: 0.18 10^3/UL (ref 0–0.03)
IMMATURE GRANS % (M): 2.1 % (ref 0–0.43)
LIPASE: 67 U/L (ref 23–300)
LYMPHOCYTES #: 1.2 10^3/UL (ref 0.8–2.9)
LYMPHOCYTES %: 14 % (ref 15–51)
MAGNESIUM: 2.5 MG/DL (ref 1.7–2.5)
MEAN CORPUSCULAR HEMOGLOBIN: 32.2 PG (ref 29–33)
MEAN CORPUSCULAR HGB CONC: 33.1 G/DL (ref 32–37)
MEAN CORPUSCULAR VOLUME: 97.3 FL (ref 82–101)
MEAN PLATELET VOLUME: 10 FL (ref 7.4–10.4)
MONOCYTE #: 0.6 10^3/UL (ref 0.3–0.9)
MONOCYTES %: 7.3 % (ref 0–11)
NEUTROPHIL #: 6.4 10^3/UL (ref 1.6–7.5)
NEUTROPHILS %: 74.7 % (ref 39–77)
NUCLEATED RED BLOOD CELLS #: 0 10^3/UL (ref 0–0)
NUCLEATED RED BLOOD CELLS%: 0.2 /100WBC (ref 0–0)
PHOSPHORUS: 4 MG/DL (ref 2.5–4.9)
PLATELET COUNT: 120 10^3/UL (ref 140–415)
POTASSIUM: 4.3 MMOL/L (ref 3.5–5.1)
RED BLOOD COUNT: 2.95 10^6/UL (ref 4.7–6.1)
RED CELL DISTRIBUTION WIDTH: 17.2 % (ref 11.5–14.5)
SODIUM: 143 MMOL/L (ref 135–144)
TROPONIN-I: 2.02 NG/ML (ref 0–0.12)
VANCOMYCIN,RANDOM: 11.3 UG/ML
WHITE BLOOD COUNT: 8.5 10^3/UL (ref 4.8–10.8)

## 2019-03-10 RX ADMIN — CEFEPIME SCH MLS/HR: 1 INJECTION, POWDER, FOR SOLUTION INTRAMUSCULAR; INTRAVENOUS at 11:49

## 2019-03-10 RX ADMIN — LEVALBUTEROL HYDROCHLORIDE 1 MG: 0.63 SOLUTION RESPIRATORY (INHALATION) at 13:36

## 2019-03-10 RX ADMIN — LACTULOSE 1 GM: 20 SOLUTION ORAL at 08:26

## 2019-03-10 RX ADMIN — LACTULOSE 1 GM: 20 SOLUTION ORAL at 20:49

## 2019-03-10 RX ADMIN — LACTULOSE SCH GM: 10 SOLUTION ORAL at 20:49

## 2019-03-10 RX ADMIN — SEVELAMER CARBONATE SCH GM: 800 POWDER, FOR SUSPENSION ORAL at 12:32

## 2019-03-10 RX ADMIN — SEVELAMER CARBONATE SCH GM: 800 POWDER, FOR SUSPENSION ORAL at 08:26

## 2019-03-10 RX ADMIN — ONDANSETRON HYDROCHLORIDE 1 MG: 2 INJECTION, SOLUTION INTRAMUSCULAR; INTRAVENOUS at 14:05

## 2019-03-10 RX ADMIN — LACTULOSE SCH GM: 10 SOLUTION ORAL at 01:00

## 2019-03-10 RX ADMIN — DOCUSATE SODIUM SCH MG: 100 CAPSULE, LIQUID FILLED ORAL at 20:49

## 2019-03-10 RX ADMIN — PANTOPRAZOLE SODIUM 1 MG: 40 INJECTION, POWDER, FOR SOLUTION INTRAVENOUS at 06:56

## 2019-03-10 RX ADMIN — INSULIN ASPART 1 UNIT: 100 INJECTION, SOLUTION INTRAVENOUS; SUBCUTANEOUS at 17:35

## 2019-03-10 RX ADMIN — LACTULOSE 1 GM: 20 SOLUTION ORAL at 05:00

## 2019-03-10 RX ADMIN — OXYCODONE HYDROCHLORIDE AND ACETAMINOPHEN SCH MG: 500 TABLET ORAL at 08:26

## 2019-03-10 RX ADMIN — LACTULOSE SCH GM: 10 SOLUTION ORAL at 05:00

## 2019-03-10 RX ADMIN — DOCUSATE SODIUM 1 MG: 100 CAPSULE, LIQUID FILLED ORAL at 20:49

## 2019-03-10 RX ADMIN — LEVOTHYROXINE SODIUM 1 MCG: 50 TABLET ORAL at 06:56

## 2019-03-10 RX ADMIN — LACTULOSE 1 GM: 20 SOLUTION ORAL at 01:00

## 2019-03-10 RX ADMIN — ATORVASTATIN CALCIUM SCH MG: 40 TABLET, FILM COATED ORAL at 20:49

## 2019-03-10 RX ADMIN — LEVALBUTEROL HYDROCHLORIDE 1 MG: 0.63 SOLUTION RESPIRATORY (INHALATION) at 08:00

## 2019-03-10 RX ADMIN — SEVELAMER CARBONATE 1 GM: 800 POWDER, FOR SUSPENSION ORAL at 08:26

## 2019-03-10 RX ADMIN — LACTULOSE 1 GM: 20 SOLUTION ORAL at 17:25

## 2019-03-10 RX ADMIN — THERA TABS 1 TAB: TAB at 08:26

## 2019-03-10 RX ADMIN — FERROUS SULFATE TAB 325 MG (65 MG ELEMENTAL FE) SCH MG: 325 (65 FE) TAB at 08:29

## 2019-03-10 RX ADMIN — LACTULOSE SCH GM: 10 SOLUTION ORAL at 08:26

## 2019-03-10 RX ADMIN — LEVALBUTEROL HYDROCHLORIDE SCH MG: 0.63 SOLUTION RESPIRATORY (INHALATION) at 13:36

## 2019-03-10 RX ADMIN — INSULIN ASPART 1 UNIT: 100 INJECTION, SOLUTION INTRAVENOUS; SUBCUTANEOUS at 08:00

## 2019-03-10 RX ADMIN — SEVELAMER CARBONATE 1 GM: 800 POWDER, FOR SUSPENSION ORAL at 17:25

## 2019-03-10 RX ADMIN — LACTULOSE SCH GM: 10 SOLUTION ORAL at 13:00

## 2019-03-10 RX ADMIN — LEVALBUTEROL HYDROCHLORIDE SCH MG: 0.63 SOLUTION RESPIRATORY (INHALATION) at 08:00

## 2019-03-10 RX ADMIN — SEVELAMER CARBONATE 1 GM: 800 POWDER, FOR SUSPENSION ORAL at 12:32

## 2019-03-10 RX ADMIN — ATORVASTATIN CALCIUM 1 MG: 40 TABLET, FILM COATED ORAL at 20:49

## 2019-03-10 RX ADMIN — LACTULOSE SCH GM: 10 SOLUTION ORAL at 17:25

## 2019-03-10 RX ADMIN — HEPARIN SODIUM 1 UNIT: 1000 INJECTION, SOLUTION INTRAVENOUS; SUBCUTANEOUS at 14:53

## 2019-03-10 RX ADMIN — PANTOPRAZOLE SODIUM 1 MG: 40 INJECTION, POWDER, FOR SOLUTION INTRAVENOUS at 17:25

## 2019-03-10 RX ADMIN — THERA TABS SCH TAB: TAB at 08:26

## 2019-03-10 RX ADMIN — CEFEPIME 1 MLS/HR: 1 INJECTION, POWDER, FOR SOLUTION INTRAMUSCULAR; INTRAVENOUS at 11:49

## 2019-03-10 RX ADMIN — FERROUS SULFATE TAB 325 MG (65 MG ELEMENTAL FE) 1 MG: 325 (65 FE) TAB at 08:29

## 2019-03-10 RX ADMIN — LEVALBUTEROL HYDROCHLORIDE 1 MG: 0.63 SOLUTION RESPIRATORY (INHALATION) at 21:15

## 2019-03-10 RX ADMIN — LEVOTHYROXINE SODIUM SCH MCG: 50 TABLET ORAL at 06:56

## 2019-03-10 RX ADMIN — LEVALBUTEROL HYDROCHLORIDE SCH MG: 0.63 SOLUTION RESPIRATORY (INHALATION) at 21:15

## 2019-03-10 RX ADMIN — PANTOPRAZOLE SODIUM SCH MG: 40 INJECTION, POWDER, FOR SOLUTION INTRAVENOUS at 06:56

## 2019-03-10 RX ADMIN — PANTOPRAZOLE SODIUM SCH MG: 40 INJECTION, POWDER, FOR SOLUTION INTRAVENOUS at 17:25

## 2019-03-10 RX ADMIN — LEVALBUTEROL HYDROCHLORIDE SCH MG: 0.63 SOLUTION RESPIRATORY (INHALATION) at 01:22

## 2019-03-10 RX ADMIN — LACTULOSE 1 GM: 20 SOLUTION ORAL at 13:00

## 2019-03-10 RX ADMIN — OXYCODONE HYDROCHLORIDE AND ACETAMINOPHEN 1 MG: 500 TABLET ORAL at 08:26

## 2019-03-10 RX ADMIN — SEVELAMER CARBONATE SCH GM: 800 POWDER, FOR SUSPENSION ORAL at 17:25

## 2019-03-10 RX ADMIN — VANCOMYCIN HYDROCHLORIDE 1 MLS/HR: 1 INJECTION, POWDER, LYOPHILIZED, FOR SOLUTION INTRAVENOUS at 16:41

## 2019-03-10 RX ADMIN — INSULIN ASPART 1 UNIT: 100 INJECTION, SOLUTION INTRAVENOUS; SUBCUTANEOUS at 20:56

## 2019-03-10 RX ADMIN — LEVALBUTEROL HYDROCHLORIDE 1 MG: 0.63 SOLUTION RESPIRATORY (INHALATION) at 01:22

## 2019-03-10 RX ADMIN — INSULIN ASPART 1 UNIT: 100 INJECTION, SOLUTION INTRAVENOUS; SUBCUTANEOUS at 12:00

## 2019-03-10 NOTE — CONS
Assessment/Plan


Assessment/Plan


Hospital Course (Demo Recall)


- fever, diaphoresis and chills associated with dialysis prior to admission and 


again today, concerning for possible catheter related bloodstream infection. 


Another possibility is fever due to GIB


- ESRD on HD via HD catheter on R chest wall. Pt does not remember how old the 


catheter is


- GIB, scheduled for EGD on 3/11/2019


- DM


- CAD


- LLL infiltrate vs. atelectasis on CXR upon admission but Pt does not have resp


Sx


- s/p type 2 MI vs. demand ischemia due to acute on chronic anemia


- acute on chronic anemia


- prostatic hypertrophy


- hypothyroidism


- s/p coronary artery bypass grafting


- h/o previous MI


- h/o diabetic wound of RLE


- status post right BKA





recommendations


- I recommended repeat pancultures plus lactic acid and explained the 


indications, but Pt declined them saying that he feels that he has had excessive


number of tests, reasons for the test have not been clear to him.  He said that 


a provider informed him that his blood cultures were negative and therefore, did


not feel the same tests had to be repeated.  I discussed their indication but he


did not want to have these tests tonight. but he agreed with urine culture if he


urinates.  I ordered urinalysis and urine culture tonight, and blood cultures, 


lactic acid, procalcitonin, CXR in the morning.


- we will order blood cultures from his dialysis catheter at his next HD on 


3/12/2019


- I recommend continuing IV vancomycin (3/8/2019-), and change cefepime to 


renally dosed meropenem (3/11/2019-) - order changed


- management d/w Pt and her RN Amalia





Consultation Date/Type/Reason


Admit Date/Time


Mar 7, 2019 at 13:32


Date of Consultation:  Mar 10, 2019


Type of Consult


ID


Reason for Consultation


fever


Requesting Provider:  JEREMY MAYORGA NP


Date/Time of Note


DATE: 3/10/19 


TIME: 23:07





Hx of Present Illness


This is a 67 yo male with DM, CAD, ESRD on HD via HD catheter on R chest wall.  


Pt was brought to ER on 3/7/2019 because he had temp 103F, and acute hypoxic 


resp failure at HD center.  Upon arrival at ER, his temp was 100.4F.  He did not


have leukocytosis.  His lab was significant for anemia Hgb 6.8 and elevated 


troponin.  His CXR showed possible LLL infiltrate vs. atelectasis.  He tested 


negative for influenza and blood cultures x2 upon admission were negative.   Pt 


has been receiving IV vancomycin and cefepime.  During this admission, his 


troponin continued to increase, highest at 3.14 on 3/10/2019.  He sustained  


type 2 MI vs. demand ischemia due to acute on chronic anemia.  The source of 


anemia has been attributed to GIB as he had upper GIB recently.  Pt was 


evaluated by GI, and despite his cardiac risk, it was deemed necessary to 


proceed with EGD tomorrow. Today his HD started at approximately at 11:00 am.  


during this dialysis, Pt was initially diaphoretic, and then he felt chills.  


Subsequently, he felt nauseated and had UGIB.  At that time, temp was reportedly


100.6F.  This afternoon his temp increased to 103F.  As of my interview, his 


temp normalized.  Aside from the above, Pt did not have other Sx such as HA, r


espiratory Sx.  He makes little urine and denies dysuria.  MADDY Mayorga requested 


ID consultation on this Pt.


Constitutional:  chills, diaphoresis, febrile


Eyes:  no complaints


ENT:  no complaints


Respiratory:  no complaints


Cardiovascular:  no complaints


Gastrointestinal:  nausea, vomiting


Genitourinary:  other (makes little urine); 


   No dysuria


Musculoskeletal:  other (s/p R BKA)


Skin:  no complaints


Neurologic:  no complaints





Past Medical History


Medical History:  angina, congestive heart failure, coronary artery disease, 


diabetes, GI bleed, hypertension, hypothyroid, renal disease


Home Meds


Reported Medications


Polyvinyl Alcohol (Tears Again) 15 Ml Drops, 15 ML OP DAILY PRN for DRY EYES, 


BOTTLE


   3/7/19


Ondansetron Hcl* (Zofran*) 4 Mg Tab, 4 MG PO Q6H PRN for NAUSEA AND OR VOMITING,


TAB


   3/7/19


Ascorbic Acid* (Vitamin C*) 500 Mg Capsule.sa, 500 MG PO DAILY, CAP


   3/7/19


Acetaminophen* (Acetaminophen*) 500 MG Extra Strength Tablet, 1000 MG PO Q4H  


PRN for MILD PAIN(1-3)OR ELEVATED TEMP, TAB


   3/7/19


Acetaminophen* (Acetaminophen*) 325 Mg Tablet, 325 MG PO Q4H PRN for PAIN AND OR


ELEVATED TEMP, #30 TAB


   3/7/19


Levothyroxine Sodium* (Levoxyl*) 50 Mcg Tablet, 50 MCG PO BEFORE BREAKFAST, #30 


TAB


   3/7/19


Sevelamer Carbonate* (Renvela*) 800 Mg Tablet, 0.8 GM PO WITH MEALS, TAB


   3/7/19


Multivitamins* (Theragran*) 1 Tab Tab, 1 TAB PO DAILY, TAB


   3/7/19


Atorvastatin Calcium* (Atorvastatin Calcium*) 20 Mg Tablet, 20 MG PO QHS, #30 


TAB


   3/7/19


Lisinopril* (Lisinopril*) 20 Mg Tablet, 20 MG PO DAILY for MON,WED,FRI,SUN., #30


TAB


   3/7/19


Atorvastatin Calcium* (Atorvastatin Calcium*) 20 Mg Tablet, 20 MG PO QHS, #30 T


AB


   3/7/19


Insulin Glargine* (Lantus*) 100 Unit/Ml Soln, 10 UNIT SC QHS, #1 VIAL


   3/7/19


Ferrous Sulfate* (Ferrous Sulfate*) 325 Mg Tabec, 325 MG PO DAILY, TAB


   3/7/19


Cranberry Extract (Cranberry) 425 Mg Capsule, 425 MG PO DAILY, CAP


   3/7/19


Carvedilol* (Coreg*) 6.25 Mg Tablet, 6.25 MG PO BID for TUE,THU,SAT, #60 TAB


   3/7/19


Docusate Sodium* (Docusate Sodium*) 100 Mg Capsule, 200 MG PO HS, #60 CAP


   3/7/19


Cyanocobalamin/FA/Pyridoxine (B Complex-Folic Acid Tablet) 1 Each Tablet, 1 TAB 


PO DAILY, TAB


   3/7/19


Aspirin* (Aspirin* Chew) 81 Mg Tab.chew, 81 MG PO DAILY, TAB.CHEW


   3/7/19


Medications





Current Medications


IV Flush (NS 3 ml) 3 ml PER PROTOCOL IV ;  Start 3/7/19 at 16:00


Levalbuterol (Xopenex Neb) 0.63 mg Q6H RESP  THERAPY HHN  Last administered on 


3/10/19at 21:15; Admin Dose 0.63 MG;  Start 3/7/19 at 20:00


Insulin Aspart (Novolog Insulin Pen) NOVOLOG *MILD* ALGORITHM WITH MEALS  


BEDTIME SC  Last administered on 3/10/19at 20:56; Admin Dose 2 UNIT;  Start 


3/7/19 at 18:00


Ascorbic Acid (Vitamin C) 500 mg DAILY PO  Last administered on 3/10/19at 08:26;


Admin Dose 500 MG;  Start 3/8/19 at 09:00


Docusate Sodium (Colace) 200 mg HS PO  Last administered on 3/10/19at 20:49; 


Admin Dose 200 MG;  Start 3/7/19 at 21:00


Ferrous Sulfate (Ferrous Sulfate (Ec)) 325 mg DAILY PO  Last administered on 3/1


0/19at 08:29; Admin Dose 325 MG;  Start 3/8/19 at 09:00


Levothyroxine Sodium (Synthroid) 50 mcg BEFORE  BREAKFAST PO  Last administered 


on 3/10/19at 06:56; Admin Dose 50 MCG;  Start 3/8/19 at 07:00


Multivitamins Therapeutic (Theragran) 1 tab DAILY PO  Last administered on 


3/10/19at 08:26; Admin Dose 1 TAB;  Start 3/8/19 at 09:00


Sevelamer Carbonate (Renvela) 0.8 gm WITH  MEALS PO  Last administered on 


3/10/19at 17:25; Admin Dose 0.8 GM;  Start 3/7/19 at 18:00


Atorvastatin Calcium (Lipitor) 40 mg QHS PO  Last administered on 3/10/19at 


20:49; Admin Dose 40 MG;  Start 3/7/19 at 21:00


Cefepime HCl 50 ml @  100 mls/hr Q24H IVPB  Last administered on 3/9/19at 12:08;


Admin Dose 100 MLS/HR;  Start 3/8/19 at 12:00


Vancomycin HCl (Vanco Iv Per Pharmacy) VANCOMYCIN PER PHARMACY PER PROTOCOL XX ;


 Start 3/8/19 at 07:30


Epoetin Connor (Epogen (Esrd)) 10,000 units MoWeFr@17 SC  Last administered on 


3/8/19at 18:18; Admin Dose 10,000 UNITS;  Start 3/8/19 at 17:00


Pantoprazole (Protonix Iv) 40 mg BID@06,18 IV  Last administered on 3/10/19at 


17:25; Admin Dose 40 MG;  Start 3/8/19 at 12:30


Lactulose (Enulose) 20 gm Q4 PO  Last administered on 3/10/19at 20:49; Admin 


Dose 20 GM;  Start 3/9/19 at 13:00


Carvedilol (Coreg) 3.125 mg BID PO  Last administered on 3/10/19at 20:49; Admin 


Dose 3.125 MG;  Start 3/9/19 at 15:00


Ondansetron HCl (Zofran Inj) 4 mg Q4H  PRN IV NAUSEA AND/OR VOMITING Last 


administered on 3/10/19at 14:05; Admin Dose 4 MG;  Start 3/10/19 at 14:00


Acetaminophen (Tylenol Tab) 500 mg Q6H  PRN PO MILD PAIN(1-3)OR ELEVATED TEMP;  


Start 3/10/19 at 17:00


Allergies:  


Coded Allergies:  


     No Known Allergy (Unverified , 3/7/19)





Past Surgical History


Past Surgical Hx:  other (CABG, R BKA, HD catheter placement)





Social History


Alcohol Use:  none


Smoking Status:  Never smoker


Drug Use:  none





Exam/Review of Systems


Exam


Vitals





Vital Signs


  Date      Temp  Pulse  Resp  B/P (MAP)   Pulse Ox  O2          O2 Flow    FiO2


Time                                                 Delivery    Rate


   3/10/19           72    20                    97                           21


     21:17


   3/10/19  99.7               98/51 (67)


     19:43


   3/10/19                                           Room Air


     15:57


    3/9/19                                                             2.0


     08:00








Intake and Output





3/9/19


3/9/19


3/10/19





1414:59


22:59


06:59





IntakeIntake Total


760 ml


400 ml





OutputOutput Total


60 ml





BalanceBalance


700 ml


400 ml











Constitutional:  alert, oriented


Psych:  no complaints, nl mood/affect


Head:  normocephalic, atraumatic


Eyes:  nl conjunctiva, EOMI, nl lids, nl sclera


ENMT:  nl external ears & nose, nl lips & teeth, nl nasal mucosa & septum, 


mucosa pink and moist


Neck:  supple, non-tender


Respiratory:  clear to auscultation, normal air movement


Cardiovascular:  regular rate and rhythm, nl pulses


Gastrointestinal:  soft, nl liver, spleen, non-tender


Musculoskeletal:  other (s/p R BKA); 


   No swelling


Extremities:  normal pulses; 


   No edema


Neurological:  CNS II-XII intact, nl mental status, nl speech


Skin:  nl turgor; 


   No rash or lesions





Results


Result Diagram:  


3/10/19 0516                                                                    


           3/10/19 0516





Results 24hrs





Laboratory Tests


Test
                 3/10/19
05:16  3/10/19
07:58  3/10/19
12:06  3/10/19
17:25


White Blood Count            8.5  #


Red Blood Count             2.95  L


Hemoglobin                   9.5  L


Hematocrit                  28.7  L


Mean Corpuscular             97.3


Volume


Mean Corpuscular             32.2


Hemoglobin


Mean Corpuscular            33.1  
  
              
              



Hemoglobin
Concent


Red Cell                    17.2  H


Distribution Width


Platelet Count               120  L


Mean Platelet Volume         10.0


Immature                   2.100  H


Granulocytes %


Neutrophils %                74.7


Lymphocytes %               14.0  L


Monocytes %                   7.3


Eosinophils %                 1.8


Basophils %                   0.1


Nucleated Red Blood          0.2  H


Cells %


Immature                   0.180  H


Granulocytes #


Neutrophils #                 6.4


Lymphocytes #                 1.2


Monocytes #                   0.6


Eosinophils #                 0.2


Basophils #                   0.0


Nucleated Red Blood           0.0


Cells #


Sodium Level                  143


Potassium Level               4.3


Chloride Level                101


Carbon Dioxide Level           26


Anion Gap                     16  H


Blood Urea Nitrogen          49  #H


Creatinine                  6.29  H


Est Glomerular                9  L
  
              
              



Filtrat Rate
mL/min


Glucose Level                 130


Calcium Level                 8.6


Phosphorus Level              4.0


Magnesium Level               2.5


Creatine Kinase                38


Creatine Kinase               8.6


Index


Creatinine Kinase MB        3.28  H


(Mass)


Troponin I                2.020  *H


Amylase Level                  80


Lipase                         67


Random Vancomycin            11.3


Level


Bedside Glucose                              137            137            170


Test
                 3/10/19
20:47  
              
              



Bedside Glucose              254  H








Medications


Medication





Current Medications


IV Flush (NS 3 ml) 3 ml PER PROTOCOL IV ;  Start 3/7/19 at 16:00


Levalbuterol (Xopenex Neb) 0.63 mg Q6H RESP  THERAPY HHN  Last administered on 


3/10/19at 21:15; Admin Dose 0.63 MG;  Start 3/7/19 at 20:00


Insulin Aspart (Novolog Insulin Pen) NOVOLOG *MILD* ALGORITHM WITH MEALS  


BEDTIME SC  Last administered on 3/10/19at 20:56; Admin Dose 2 UNIT;  Start 


3/7/19 at 18:00


Ascorbic Acid (Vitamin C) 500 mg DAILY PO  Last administered on 3/10/19at 08:26;


Admin Dose 500 MG;  Start 3/8/19 at 09:00


Docusate Sodium (Colace) 200 mg HS PO  Last administered on 3/10/19at 20:49; 


Admin Dose 200 MG;  Start 3/7/19 at 21:00


Ferrous Sulfate (Ferrous Sulfate (Ec)) 325 mg DAILY PO  Last administered on 


3/10/19at 08:29; Admin Dose 325 MG;  Start 3/8/19 at 09:00


Levothyroxine Sodium (Synthroid) 50 mcg BEFORE  BREAKFAST PO  Last administered 


on 3/10/19at 06:56; Admin Dose 50 MCG;  Start 3/8/19 at 07:00


Multivitamins Therapeutic (Theragran) 1 tab DAILY PO  Last administered on 


3/10/19at 08:26; Admin Dose 1 TAB;  Start 3/8/19 at 09:00


Sevelamer Carbonate (Renvela) 0.8 gm WITH  MEALS PO  Last administered on 


3/10/19at 17:25; Admin Dose 0.8 GM;  Start 3/7/19 at 18:00


Atorvastatin Calcium (Lipitor) 40 mg QHS PO  Last administered on 3/10/19at 


20:49; Admin Dose 40 MG;  Start 3/7/19 at 21:00


Cefepime HCl 50 ml @  100 mls/hr Q24H IVPB  Last administered on 3/9/19at 12:08;


Admin Dose 100 MLS/HR;  Start 3/8/19 at 12:00


Vancomycin HCl (Vanco Iv Per Pharmacy) VANCOMYCIN PER PHARMACY PER PROTOCOL XX ;


 Start 3/8/19 at 07:30


Epoetin Connor (Epogen (Esrd)) 10,000 units MoWeFr@17 SC  Last administered on 


3/8/19at 18:18; Admin Dose 10,000 UNITS;  Start 3/8/19 at 17:00


Pantoprazole (Protonix Iv) 40 mg BID@06,18 IV  Last administered on 3/10/19at 


17:25; Admin Dose 40 MG;  Start 3/8/19 at 12:30


Lactulose (Enulose) 20 gm Q4 PO  Last administered on 3/10/19at 20:49; Admin 


Dose 20 GM;  Start 3/9/19 at 13:00


Carvedilol (Coreg) 3.125 mg BID PO  Last administered on 3/10/19at 20:49; Admin 


Dose 3.125 MG;  Start 3/9/19 at 15:00


Ondansetron HCl (Zofran Inj) 4 mg Q4H  PRN IV NAUSEA AND/OR VOMITING Last admin


istered on 3/10/19at 14:05; Admin Dose 4 MG;  Start 3/10/19 at 14:00


Acetaminophen (Tylenol Tab) 500 mg Q6H  PRN PO MILD PAIN(1-3)OR ELEVATED TEMP;  


Start 3/10/19 at 17:00











MELISSA SUMMERS M.D.             Mar 10, 2019 23:20

## 2019-03-10 NOTE — PN
Date/Time of Note


Date/Time of Note


DATE: 3/10/19 


TIME: 12:33





Assessment/Plan


VTE Prophylaxis


Risk score (from Ns)>0 risk:  3


SCD applied (from Ns):  No


SCD contraindicated:  other


Pharmacological prophylaxis:  NA/contraindicated


Pharm contraindication:  anticoag not tolerated





Lines/Catheters


IV Catheter Type (from [facility]):  Permacath


Urinary Cath still in place:  No





Assessment/Plan


Hospital Course


SUBJECTIVE:  


Getting HD.





OBJECTIVE:


Physical Exam


General: Adequately build 68 year-old male lying in bed in no apparent distress.


HEENT: Normocephalic, atraumatic. Eyes: Anicteric sclerae, conjunctivae clear. 


ENT: Nasal septum is midline, oral mucosa is dry. Neck supple, no JVD noticed.


Respiratory: Bilaterally diminished breath sounds. No use of accessory muscles 


of respiration. No adventitious breath sounds.


Cardiovascular: S1, S2 heard.  Regular rate and rhythm.


Abdomen: Soft, nontender, and nondistended. Bowel sounds positive in all 4 


quadrants.


Genitourinary: Deferred.


Extremities: No cyanosis, no clubbing, no edema.  Right lower extremity below-


knee amputation.


Neurologic: The patient is awake and alert.  Oriented x1-2.


Skin: Normal skin turgor. No skin rashes.





Labs & Vitals per chart





ASSESSMENT & PLAN 





68-year-old male with comorbidities including end-stage renal disease on hemo


dialysis Tuesdays/Thursdays/Saturdays, hypertension, diabetes mellitus, anemia, 


prostatic hypertrophy, hypothyroidism, and CAD status post coronary artery 


bypass grafting.  The patient was brought in from a skilled nursing facility 


because of fevers and diaphoresis with evidence of hypoxia at the skilled 


nursing facility.  The patient was noticed to have elevated troponins, anemia, 


and left sided infiltrate on chest x-ray in the emergency room.  The patient was


admitted to inpatient setting for further treatment and evaluation.





1. NSTEMI.


-Etiology unclear.  


-Prior history of CAD.  Unable to start aspirin because of underlying anemia.


-Cardiology following.  


-2D echocardiogram showing preserved left ventricular ejection fraction.





2.  Healthcare associated pneumonia.


-Continue antimicrobials





3.  Acute respiratory failure.


-Hypoxic


-Most probably secondary to #2.


-Continue supplemental oxygen.


-Continue inhaled bronchodilators.


-CTA negative for any PE.





4.  Normocytic anemia.


-Etiology unclear.


-Transfuse blood products.  


-Obtain stool for OB.


-Gastroenterology consult obtained.


-Continue PPI.





5.  Acute encephalopathy.


-Unknown baseline mental status


-Most probably toxic metabolic in origin.


-Brain CT scan negative.





6.  End-stage renal disease on hemodialysis


-Nephrology following.





7.  Diabetes mellitus.


-Continue the patient on sliding scale insulin along with pre-meal insulin and 


basal insulin.


-Hemoglobin A1c 6.8.





8.  History of CAD.


-Hold aspirin because of anemia.


-Continue statins.


-Cardiology follow-up.





9.  Hypothyroidism.


-Continue Synthroid.





10.  Dyslipidemia.


-Continue statins.





11.  Hypertension.


-Continue antihypertensives.





12. Pulmonary hypertension.


-PA systolic pressure of 51mm Hg.


-Continue supplemental O2.





12.  Fluids, electrolytes, and nutrition.


-Renal, carbohydrate controlled diet.





13.  DVT prophylaxis.


-SCD to the left lower extremity.





15.  Plan.


-Transfuse blood products as needed.


-Monitor H&H closely.


-Await further recommendations from consultants.





The patient was seen in collaboration with Dr. Coley.


Result Diagram:  


3/10/19 0516                                                                    


           3/10/19 0516





Results 24hrs





Laboratory Tests


Test
                   3/9/19
16:27  3/9/19
20:17  3/10/19
05:16  3/10/19
07:58


Bedside Glucose                164           174                           137


White Blood Count                                          8.5  #


Red Blood Count                                           2.95  L


Hemoglobin                                                 9.5  L


Hematocrit                                                28.7  L


Mean Corpuscular                                           97.3


Volume


Mean Corpuscular                                           32.2


Hemoglobin


Mean Corpuscular       
              
                   33.1  
  



Hemoglobin
Concent


Red Cell Distribution                                     17.2  H


Width


Platelet Count                                             120  L


Mean Platelet Volume                                       10.0


Immature Granulocytes                                    2.100  H


%


Neutrophils %                                              74.7


Lymphocytes %                                             14.0  L


Monocytes %                                                 7.3


Eosinophils %                                               1.8


Basophils %                                                 0.1


Nucleated Red Blood                                        0.2  H


Cells %


Immature Granulocytes                                    0.180  H


#


Neutrophils #                                               6.4


Lymphocytes #                                               1.2


Monocytes #                                                 0.6


Eosinophils #                                               0.2


Basophils #                                                 0.0


Nucleated Red Blood                                         0.0


Cells #


Sodium Level                                                143


Potassium Level                                             4.3


Chloride Level                                              101


Carbon Dioxide Level                                         26


Anion Gap                                                   16  H


Blood Urea Nitrogen                                        49  #H


Creatinine                                                6.29  H


Est Glomerular         
              
                     9  L
  



Filtrat Rate
mL/min


Glucose Level                                               130


Calcium Level                                               8.6


Phosphorus Level                                            4.0


Magnesium Level                                             2.5


Creatine Kinase                                              38


Creatine Kinase Index                                       8.6


Creatinine Kinase MB                                      3.28  H


(Mass)


Troponin I                                              2.020  *H


Random Vancomycin                                          11.3


Level


Test
                  3/10/19
12:06  
             
              



Bedside Glucose                137








Exam/Review of Systems


Exam


Vitals





Vital Signs


  Date      Temp  Pulse  Resp  B/P (MAP)   Pulse Ox  O2          O2 Flow    FiO2


Time                                                 Delivery    Rate


   3/10/19           67


     11:40


   3/10/19  98.6           18      141/37        96  Room Air


     11:26                           (71)


   3/10/19                                                                    21


     01:22


    3/9/19                                                             2.0


     08:00








Intake and Output





3/9/19


3/9/19


3/10/19





1515:00


23:00


07:00





IntakeIntake Total


760 ml


400 ml





OutputOutput Total


60 ml





BalanceBalance


700 ml


400 ml














Results


Results 24hrs





Laboratory Tests


Test
                   3/9/19
16:27  3/9/19
20:17  3/10/19
05:16  3/10/19
07:58


Bedside Glucose                164           174                           137


White Blood Count                                          8.5  #


Red Blood Count                                           2.95  L


Hemoglobin                                                 9.5  L


Hematocrit                                                28.7  L


Mean Corpuscular                                           97.3


Volume


Mean Corpuscular                                           32.2


Hemoglobin


Mean Corpuscular       
              
                   33.1  
  



Hemoglobin
Concent


Red Cell Distribution                                     17.2  H


Width


Platelet Count                                             120  L


Mean Platelet Volume                                       10.0


Immature Granulocytes                                    2.100  H


%


Neutrophils %                                              74.7


Lymphocytes %                                             14.0  L


Monocytes %                                                 7.3


Eosinophils %                                               1.8


Basophils %                                                 0.1


Nucleated Red Blood                                        0.2  H


Cells %


Immature Granulocytes                                    0.180  H


#


Neutrophils #                                               6.4


Lymphocytes #                                               1.2


Monocytes #                                                 0.6


Eosinophils #                                               0.2


Basophils #                                                 0.0


Nucleated Red Blood                                         0.0


Cells #


Sodium Level                                                143


Potassium Level                                             4.3


Chloride Level                                              101


Carbon Dioxide Level                                         26


Anion Gap                                                   16  H


Blood Urea Nitrogen                                        49  #H


Creatinine                                                6.29  H


Est Glomerular         
              
                     9  L
  



Filtrat Rate
mL/min


Glucose Level                                               130


Calcium Level                                               8.6


Phosphorus Level                                            4.0


Magnesium Level                                             2.5


Creatine Kinase                                              38


Creatine Kinase Index                                       8.6


Creatinine Kinase MB                                      3.28  H


(Mass)


Troponin I                                              2.020  *H


Random Vancomycin                                          11.3


Level


Test
                  3/10/19
12:06  
             
              



Bedside Glucose                137








Medications


Medication





Current Medications


IV Flush (NS 3 ml) 3 ml PER PROTOCOL IV ;  Start 3/7/19 at 16:00


Levalbuterol (Xopenex Neb) 0.63 mg Q6H RESP  THERAPY HHN  Last administered on 


3/10/19at 01:22; Admin Dose 0.63 MG;  Start 3/7/19 at 20:00


Insulin Aspart (Novolog Insulin Pen) NOVOLOG *MILD* ALGORITHM WITH MEALS  


BEDTIME SC  Last administered on 3/9/19at 16:34; Admin Dose 1 UNIT;  Start 


3/7/19 at 18:00


Ascorbic Acid (Vitamin C) 500 mg DAILY PO  Last administered on 3/10/19at 08:26;


Admin Dose 500 MG;  Start 3/8/19 at 09:00


Docusate Sodium (Colace) 200 mg HS PO  Last administered on 3/9/19at 20:19; 


Admin Dose 200 MG;  Start 3/7/19 at 21:00


Ferrous Sulfate (Ferrous Sulfate (Ec)) 325 mg DAILY PO  Last administered on 


3/10/19at 08:29; Admin Dose 325 MG;  Start 3/8/19 at 09:00


Levothyroxine Sodium (Synthroid) 50 mcg BEFORE  BREAKFAST PO  Last administered 


on 3/10/19at 06:56; Admin Dose 50 MCG;  Start 3/8/19 at 07:00


Multivitamins Therapeutic (Theragran) 1 tab DAILY PO  Last administered on 


3/10/19at 08:26; Admin Dose 1 TAB;  Start 3/8/19 at 09:00


Sevelamer Carbonate (Renvela) 0.8 gm WITH  MEALS PO  Last administered on 


3/10/19at 08:26; Admin Dose 0.8 GM;  Start 3/7/19 at 18:00


Atorvastatin Calcium (Lipitor) 40 mg QHS PO  Last administered on 3/9/19at 


20:19; Admin Dose 40 MG;  Start 3/7/19 at 21:00


Cefepime HCl 50 ml @  100 mls/hr Q24H IVPB  Last administered on 3/9/19at 12:08;


Admin Dose 100 MLS/HR;  Start 3/8/19 at 12:00


Vancomycin HCl (Vanco Iv Per Pharmacy) VANCOMYCIN PER PHARMACY PER PROTOCOL XX ;


 Start 3/8/19 at 07:30


Epoetin Connor (Epogen (Esrd)) 10,000 units MoWeFr@17 SC  Last administered on 


3/8/19at 18:18; Admin Dose 10,000 UNITS;  Start 3/8/19 at 17:00


Pantoprazole (Protonix Iv) 40 mg BID@06,18 IV  Last administered on 3/10/19at 


06:56; Admin Dose 40 MG;  Start 3/8/19 at 12:30


Lactulose (Enulose) 20 gm Q4 PO  Last administered on 3/10/19at 08:26; Admin 


Dose 20 GM;  Start 3/9/19 at 13:00


Carvedilol (Coreg) 3.125 mg BID PO  Last administered on 3/9/19at 20:20; Admin 


Dose 3.125 MG;  Start 3/9/19 at 15:00


Vancomycin HCl 250 ml @  125 mls/hr ONCE  ONCE IVPB ;  Start 3/10/19 at 15:00;  


Stop 3/10/19 at 16:59











JEREMY PADILLA NP               Mar 10, 2019 12:34

## 2019-03-11 VITALS — DIASTOLIC BLOOD PRESSURE: 60 MMHG | SYSTOLIC BLOOD PRESSURE: 128 MMHG | HEART RATE: 77 BPM | RESPIRATION RATE: 19 BRPM

## 2019-03-11 VITALS — RESPIRATION RATE: 21 BRPM | DIASTOLIC BLOOD PRESSURE: 56 MMHG | HEART RATE: 61 BPM | SYSTOLIC BLOOD PRESSURE: 117 MMHG

## 2019-03-11 VITALS — SYSTOLIC BLOOD PRESSURE: 118 MMHG | RESPIRATION RATE: 18 BRPM | DIASTOLIC BLOOD PRESSURE: 6 MMHG | HEART RATE: 63 BPM

## 2019-03-11 VITALS — HEART RATE: 66 BPM | RESPIRATION RATE: 18 BRPM | DIASTOLIC BLOOD PRESSURE: 55 MMHG | SYSTOLIC BLOOD PRESSURE: 133 MMHG

## 2019-03-11 VITALS — HEART RATE: 62 BPM

## 2019-03-11 VITALS — HEART RATE: 61 BPM

## 2019-03-11 VITALS — HEART RATE: 63 BPM | SYSTOLIC BLOOD PRESSURE: 111 MMHG | RESPIRATION RATE: 18 BRPM | DIASTOLIC BLOOD PRESSURE: 57 MMHG

## 2019-03-11 VITALS — HEART RATE: 64 BPM

## 2019-03-11 VITALS — HEART RATE: 62 BPM | SYSTOLIC BLOOD PRESSURE: 105 MMHG | DIASTOLIC BLOOD PRESSURE: 42 MMHG | RESPIRATION RATE: 18 BRPM

## 2019-03-11 VITALS — HEART RATE: 63 BPM

## 2019-03-11 VITALS — HEART RATE: 150 BPM

## 2019-03-11 VITALS — HEART RATE: 65 BPM

## 2019-03-11 LAB
ADD MAN DIFF?: NO
ALANINE AMINOTRANSFERASE: 33 IU/L (ref 13–69)
ALBUMIN/GLOBULIN RATIO: 1.14
ALBUMIN: 4 G/DL (ref 3.3–4.9)
ALKALINE PHOSPHATASE: 164 IU/L (ref 42–121)
ANION GAP: 17 (ref 5–13)
ASPARTATE AMINO TRANSFERASE: 25 IU/L (ref 15–46)
BASOPHIL #: 0.1 10^3/UL (ref 0–0.1)
BASOPHILS %: 0.3 % (ref 0–2)
BILIRUBIN,DIRECT: 0 MG/DL (ref 0–0.2)
BILIRUBIN,TOTAL: 0.5 MG/DL (ref 0.2–1.3)
BLOOD UREA NITROGEN: 37 MG/DL (ref 7–20)
CALCIUM: 9 MG/DL (ref 8.4–10.2)
CARBON DIOXIDE: 25 MMOL/L (ref 21–31)
CHLORIDE: 103 MMOL/L (ref 97–110)
CK INDEX: 6.8
CK-MB: 1.98 NG/ML (ref 0–2.4)
CREATINE KINASE: 29 IU/L (ref 23–200)
CREATININE: 5.19 MG/DL (ref 0.61–1.24)
EOSINOPHILS #: 0.1 10^3/UL (ref 0–0.5)
EOSINOPHILS %: 0.3 % (ref 0–7)
GLOBULIN: 3.5 G/DL (ref 1.3–3.2)
GLUCOSE: 153 MG/DL (ref 70–220)
HEMATOCRIT: 30.1 % (ref 42–52)
HEMOGLOBIN: 9.8 G/DL (ref 14–18)
IMMATURE GRANS #M: 0.17 10^3/UL (ref 0–0.03)
IMMATURE GRANS % (M): 1 % (ref 0–0.43)
LACTIC ACID: 1.1 MMOL/L (ref 0.5–2)
LYMPHOCYTES #: 1 10^3/UL (ref 0.8–2.9)
LYMPHOCYTES %: 5.4 % (ref 15–51)
MAGNESIUM: 2.4 MG/DL (ref 1.7–2.5)
MEAN CORPUSCULAR HEMOGLOBIN: 32.2 PG (ref 29–33)
MEAN CORPUSCULAR HGB CONC: 32.6 G/DL (ref 32–37)
MEAN CORPUSCULAR VOLUME: 99 FL (ref 82–101)
MEAN PLATELET VOLUME: 10.2 FL (ref 7.4–10.4)
MONOCYTE #: 0.8 10^3/UL (ref 0.3–0.9)
MONOCYTES %: 4.3 % (ref 0–11)
NEUTROPHIL #: 15.9 10^3/UL (ref 1.6–7.5)
NEUTROPHILS %: 88.7 % (ref 39–77)
NUCLEATED RED BLOOD CELLS #: 0 10^3/UL (ref 0–0)
NUCLEATED RED BLOOD CELLS%: 0.1 /100WBC (ref 0–0)
PHOSPHORUS: 3.5 MG/DL (ref 2.5–4.9)
PLATELET COUNT: 140 10^3/UL (ref 140–415)
POTASSIUM: 4 MMOL/L (ref 3.5–5.1)
RED BLOOD COUNT: 3.04 10^6/UL (ref 4.7–6.1)
RED CELL DISTRIBUTION WIDTH: 17.2 % (ref 11.5–14.5)
SODIUM: 145 MMOL/L (ref 135–144)
TOTAL PROTEIN: 7.5 G/DL (ref 6.1–8.1)
TROPONIN-I: 1.38 NG/ML (ref 0–0.12)
WHITE BLOOD COUNT: 17.9 10^3/UL (ref 4.8–10.8)

## 2019-03-11 PROCEDURE — 30233N1 TRANSFUSION OF NONAUTOLOGOUS RED BLOOD CELLS INTO PERIPHERAL VEIN, PERCUTANEOUS APPROACH: ICD-10-PCS

## 2019-03-11 PROCEDURE — 5A1D70Z PERFORMANCE OF URINARY FILTRATION, INTERMITTENT, LESS THAN 6 HOURS PER DAY: ICD-10-PCS

## 2019-03-11 PROCEDURE — 0DB68ZX EXCISION OF STOMACH, VIA NATURAL OR ARTIFICIAL OPENING ENDOSCOPIC, DIAGNOSTIC: ICD-10-PCS | Performed by: INTERNAL MEDICINE

## 2019-03-11 PROCEDURE — 0DB68ZX EXCISION OF STOMACH, VIA NATURAL OR ARTIFICIAL OPENING ENDOSCOPIC, DIAGNOSTIC: ICD-10-PCS

## 2019-03-11 RX ADMIN — FENTANYL CITRATE 1 MCG: 50 INJECTION, SOLUTION INTRAMUSCULAR; INTRAVENOUS at 11:26

## 2019-03-11 RX ADMIN — THERA TABS 1 TAB: TAB at 08:27

## 2019-03-11 RX ADMIN — SEVELAMER CARBONATE 1 GM: 800 POWDER, FOR SUSPENSION ORAL at 13:29

## 2019-03-11 RX ADMIN — OXYCODONE HYDROCHLORIDE AND ACETAMINOPHEN SCH MG: 500 TABLET ORAL at 08:27

## 2019-03-11 RX ADMIN — INSULIN ASPART 1 UNIT: 100 INJECTION, SOLUTION INTRAVENOUS; SUBCUTANEOUS at 17:23

## 2019-03-11 RX ADMIN — LACTULOSE 1 GM: 20 SOLUTION ORAL at 02:03

## 2019-03-11 RX ADMIN — ETOMIDATE 1 MG: 2 INJECTION, SOLUTION INTRAVENOUS at 11:26

## 2019-03-11 RX ADMIN — LACTULOSE SCH GM: 10 SOLUTION ORAL at 08:35

## 2019-03-11 RX ADMIN — LACTULOSE 1 GM: 20 SOLUTION ORAL at 13:00

## 2019-03-11 RX ADMIN — LEVALBUTEROL HYDROCHLORIDE SCH MG: 0.63 SOLUTION RESPIRATORY (INHALATION) at 13:35

## 2019-03-11 RX ADMIN — LACTULOSE SCH GM: 10 SOLUTION ORAL at 02:03

## 2019-03-11 RX ADMIN — LEVALBUTEROL HYDROCHLORIDE 1 MG: 0.63 SOLUTION RESPIRATORY (INHALATION) at 13:35

## 2019-03-11 RX ADMIN — INSULIN ASPART 1 UNIT: 100 INJECTION, SOLUTION INTRAVENOUS; SUBCUTANEOUS at 13:29

## 2019-03-11 RX ADMIN — LIDOCAINE HYDROCHLORIDE 1 APPLIC: 40 SOLUTION TOPICAL at 11:21

## 2019-03-11 RX ADMIN — LACTULOSE SCH GM: 10 SOLUTION ORAL at 06:00

## 2019-03-11 RX ADMIN — DOCUSATE SODIUM SCH MG: 100 CAPSULE, LIQUID FILLED ORAL at 19:46

## 2019-03-11 RX ADMIN — SEVELAMER CARBONATE SCH GM: 800 POWDER, FOR SUSPENSION ORAL at 08:27

## 2019-03-11 RX ADMIN — DOCUSATE SODIUM 1 MG: 100 CAPSULE, LIQUID FILLED ORAL at 19:46

## 2019-03-11 RX ADMIN — PANTOPRAZOLE SODIUM SCH MG: 40 INJECTION, POWDER, FOR SOLUTION INTRAVENOUS at 17:14

## 2019-03-11 RX ADMIN — LACTULOSE 1 GM: 20 SOLUTION ORAL at 08:35

## 2019-03-11 RX ADMIN — LEVOTHYROXINE SODIUM 1 MCG: 50 TABLET ORAL at 06:00

## 2019-03-11 RX ADMIN — FERROUS SULFATE TAB 325 MG (65 MG ELEMENTAL FE) SCH MG: 325 (65 FE) TAB at 08:27

## 2019-03-11 RX ADMIN — FERROUS SULFATE TAB 325 MG (65 MG ELEMENTAL FE) 1 MG: 325 (65 FE) TAB at 08:27

## 2019-03-11 RX ADMIN — LACTULOSE 1 GM: 20 SOLUTION ORAL at 06:00

## 2019-03-11 RX ADMIN — LEVALBUTEROL HYDROCHLORIDE SCH MG: 0.63 SOLUTION RESPIRATORY (INHALATION) at 02:32

## 2019-03-11 RX ADMIN — LACTULOSE 1 GM: 20 SOLUTION ORAL at 17:00

## 2019-03-11 RX ADMIN — LACTULOSE SCH GM: 10 SOLUTION ORAL at 13:00

## 2019-03-11 RX ADMIN — INSULIN ASPART 1 UNIT: 100 INJECTION, SOLUTION INTRAVENOUS; SUBCUTANEOUS at 19:49

## 2019-03-11 RX ADMIN — LEVALBUTEROL HYDROCHLORIDE 1 MG: 0.63 SOLUTION RESPIRATORY (INHALATION) at 08:38

## 2019-03-11 RX ADMIN — ATORVASTATIN CALCIUM SCH MG: 40 TABLET, FILM COATED ORAL at 19:46

## 2019-03-11 RX ADMIN — INSULIN ASPART 1 UNIT: 100 INJECTION, SOLUTION INTRAVENOUS; SUBCUTANEOUS at 08:00

## 2019-03-11 RX ADMIN — PANTOPRAZOLE SODIUM SCH MG: 40 INJECTION, POWDER, FOR SOLUTION INTRAVENOUS at 06:00

## 2019-03-11 RX ADMIN — MEROPENEM 1 MLS/HR: 500 INJECTION INTRAVENOUS at 10:26

## 2019-03-11 RX ADMIN — EPOETIN ALFA 1 UNITS: 10000 SOLUTION INTRAVENOUS; SUBCUTANEOUS at 17:20

## 2019-03-11 RX ADMIN — LACTULOSE SCH GM: 10 SOLUTION ORAL at 17:00

## 2019-03-11 RX ADMIN — SEVELAMER CARBONATE SCH GM: 800 POWDER, FOR SUSPENSION ORAL at 13:29

## 2019-03-11 RX ADMIN — PANTOPRAZOLE SODIUM 1 MG: 40 INJECTION, POWDER, FOR SOLUTION INTRAVENOUS at 17:14

## 2019-03-11 RX ADMIN — PANTOPRAZOLE SODIUM 1 MG: 40 INJECTION, POWDER, FOR SOLUTION INTRAVENOUS at 06:00

## 2019-03-11 RX ADMIN — OXYCODONE HYDROCHLORIDE AND ACETAMINOPHEN 1 MG: 500 TABLET ORAL at 08:27

## 2019-03-11 RX ADMIN — LEVOTHYROXINE SODIUM SCH MCG: 50 TABLET ORAL at 06:00

## 2019-03-11 RX ADMIN — SEVELAMER CARBONATE 1 GM: 800 POWDER, FOR SUSPENSION ORAL at 17:14

## 2019-03-11 RX ADMIN — LEVALBUTEROL HYDROCHLORIDE 1 MG: 0.63 SOLUTION RESPIRATORY (INHALATION) at 02:32

## 2019-03-11 RX ADMIN — SEVELAMER CARBONATE SCH GM: 800 POWDER, FOR SUSPENSION ORAL at 17:14

## 2019-03-11 RX ADMIN — SEVELAMER CARBONATE 1 GM: 800 POWDER, FOR SUSPENSION ORAL at 08:27

## 2019-03-11 RX ADMIN — LEVALBUTEROL HYDROCHLORIDE SCH MG: 0.63 SOLUTION RESPIRATORY (INHALATION) at 08:38

## 2019-03-11 RX ADMIN — ATORVASTATIN CALCIUM 1 MG: 40 TABLET, FILM COATED ORAL at 19:46

## 2019-03-11 RX ADMIN — THERA TABS SCH TAB: TAB at 08:27

## 2019-03-11 NOTE — PN
Date/Time of Note


Date/Time of Note


DATE: 3/11/19 


TIME: 16:33





Assessment/Plan


VTE Prophylaxis


Risk score (from Ns)>0 risk:  4


[place] [facility] [place]  [place]


SCD contraindicated:  low risk/ambulating


Pharmacological prophylaxis:  NA/contraindicated


Pharm contraindication:  bleeding





Lines/Catheters


IV Catheter Type (from [facility]):  Peripheral IV


Urinary Cath still in place:  No





Assessment/Plan


Hospital Course





Assessment and plan


1.  Sepsis, severe due to hcap, stable finish antibiotics


2.  HCAP stable finish antibiotics


3.  Acute hypoxic respiratory failure multifactorial, #2 vs DD vs anemia assoc 


pul edema, stable improved, O2 as needed


4.  Anemia, mod/ severe, possible acute blood loss, sp EGD: Gastritis, follow-up


on pathology.  No asa ~ 2 weeks


5.  Abn troponin: Secondary MI vs primary NSTEMI. no chest pain/ arrhythmias 


therefore continue medical management.


6.  Chronic CAD/CABG, consider stress cath as indicated. Not a candidate for asa


due to #4.


7.  Failure to thrive, transfer to [facility] when stable needs advanced care 


planning reevaluated


8.  Hypothyroidism


9.  Type 2 diabetes


10.  BPH


11.  ESRD stable continue dialysis Tuesday Thursday Saturday


12.  Pulmonary hypertension?


13.  Dialysis access right chest infection?  Obtain blood cultures consider 


permacath.


13.  Right BKA status





Subjective: Events noted





Objective: Fever noted 103 recently.  Sinus





PE


No pallor JVD adenopathy


Reg no murmur gallop


Dimin bilaterally; rt access c/d/i


Bs+ nt nd no RRG


No edema rt bka status


Result Diagram:  


3/11/19 0546                                                                    


           3/11/19 0546





Results 24hrs





Laboratory Tests


Test
                 3/10/19
17:25  3/10/19
20:47  3/11/19
05:46  3/11/19
08:15


Bedside Glucose               170           254  H                         136


White Blood Count                                        17.9  #H


Red Blood Count                                           3.04  L


Hemoglobin                                                 9.8  L


Hematocrit                                                30.1  L


Mean Corpuscular                                           99.0


Volume


Mean Corpuscular                                           32.2


Hemoglobin


Mean Corpuscular   
                 
                    32.6  
  



Hemoglobin
Concen


t


Red Cell                                                  17.2  H


Distribution


Width


Platelet Count                                              140


Mean Platelet                                              10.2


Volume


Immature                                                 1.000  H


Granulocytes %


Neutrophils %                                             88.7  H


Lymphocytes %                                              5.4  L


Monocytes %                                                 4.3


Eosinophils %                                               0.3


Basophils %                                                 0.3


Nucleated Red                                              0.1  H


Blood Cells %


Immature                                                 0.170  H


Granulocytes #


Neutrophils #                                             15.9  H


Lymphocytes #                                               1.0


Monocytes #                                                 0.8


Eosinophils #                                               0.1


Basophils #                                                 0.1


Nucleated Red                                               0.0


Blood Cells #


Sodium Level                                               145  H


Potassium Level                                             4.0


Chloride Level                                              103


Carbon Dioxide                                               25


Level


Anion Gap                                                   17  H


Blood Urea                                                 37  #H


Nitrogen


Creatinine                                                5.19  H


Est Glomerular     
                 
                     11  L
  



Filtrat


Rate
mL/min


Glucose Level                                               153


Lactic Acid Level                                           1.1


Calcium Level                                               9.0


Phosphorus Level                                            3.5


Magnesium Level                                             2.4


Total Bilirubin                                             0.5


Direct Bilirubin                                           0.00


Indirect                                                    0.5


Bilirubin


Aspartate Amino    
                 
                      25  
  



Transf
(AST/SGOT)


Alanine            
                 
                      33  
  



Aminotransferase



(ALT/SGPT)


Alkaline                                                   164  H


Phosphatase


Creatine Kinase                                              29


Creatine Kinase                                             6.8


Index


Creatinine Kinase                                          1.98


MB (Mass)


Troponin I                                              1.380  *H


Total Protein                                               7.5


Albumin                                                     4.0


Globulin                                                  3.50  H


Albumin/Globulin                                           1.14


Ratio


Test
                 3/11/19
09:10  3/11/19
13:21  
              



Lab Scanned        BLOOD
TRANSFUSIO  
              
              



Report
            N


Bedside Glucose                              142








Exam/Review of Systems


Exam


Vitals





Vital Signs


  Date      Temp  Pulse  Resp  B/P (MAP)   Pulse Ox  O2          O2 Flow    FiO2


Time                                                 Delivery    Rate


   3/11/19  98.2     77    19      128/60        96


     15:32                           (82)


   3/11/19                                                                    21


     13:35


   3/11/19                                           Room Air


     13:13


    3/9/19                                                             2.0


     08:00








Intake and Output





3/10/19


3/10/19


3/11/19





1515:00


23:00


07:00





IntakeIntake Total


1690 ml


250 ml





OutputOutput Total


2400 ml





BalanceBalance


-2400 ml


1690 ml


250 ml














Results


Results 24hrs





Laboratory Tests


Test
                 3/10/19
17:25  3/10/19
20:47  3/11/19
05:46  3/11/19
08:15


Bedside Glucose               170           254  H                         136


White Blood Count                                        17.9  #H


Red Blood Count                                           3.04  L


Hemoglobin                                                 9.8  L


Hematocrit                                                30.1  L


Mean Corpuscular                                           99.0


Volume


Mean Corpuscular                                           32.2


Hemoglobin


Mean Corpuscular   
                 
                    32.6  
  



Hemoglobin
Concen


t


Red Cell                                                  17.2  H


Distribution


Width


Platelet Count                                              140


Mean Platelet                                              10.2


Volume


Immature                                                 1.000  H


Granulocytes %


Neutrophils %                                             88.7  H


Lymphocytes %                                              5.4  L


Monocytes %                                                 4.3


Eosinophils %                                               0.3


Basophils %                                                 0.3


Nucleated Red                                              0.1  H


Blood Cells %


Immature                                                 0.170  H


Granulocytes #


Neutrophils #                                             15.9  H


Lymphocytes #                                               1.0


Monocytes #                                                 0.8


Eosinophils #                                               0.1


Basophils #                                                 0.1


Nucleated Red                                               0.0


Blood Cells #


Sodium Level                                               145  H


Potassium Level                                             4.0


Chloride Level                                              103


Carbon Dioxide                                               25


Level


Anion Gap                                                   17  H


Blood Urea                                                 37  #H


Nitrogen


Creatinine                                                5.19  H


Est Glomerular     
                 
                     11  L
  



Filtrat


Rate
mL/min


Glucose Level                                               153


Lactic Acid Level                                           1.1


Calcium Level                                               9.0


Phosphorus Level                                            3.5


Magnesium Level                                             2.4


Total Bilirubin                                             0.5


Direct Bilirubin                                           0.00


Indirect                                                    0.5


Bilirubin


Aspartate Amino    
                 
                      25  
  



Transf
(AST/SGOT)


Alanine            
                 
                      33  
  



Aminotransferase



(ALT/SGPT)


Alkaline                                                   164  H


Phosphatase


Creatine Kinase                                              29


Creatine Kinase                                             6.8


Index


Creatinine Kinase                                          1.98


MB (Mass)


Troponin I                                              1.380  *H


Total Protein                                               7.5


Albumin                                                     4.0


Globulin                                                  3.50  H


Albumin/Globulin                                           1.14


Ratio


Test
                 3/11/19
09:10  3/11/19
13:21  
              



Lab Scanned        BLOOD
TRANSFUSIO  
              
              



Report
            N


Bedside Glucose                              142








Medications


Medication





Current Medications


IV Flush (NS 3 ml) 3 ml PER PROTOCOL IV ;  Start 3/7/19 at 16:00


Levalbuterol (Xopenex Neb) 0.63 mg Q6H RESP  THERAPY HHN  Last administered on 


3/11/19at 13:35; Admin Dose 0.63 MG;  Start 3/7/19 at 20:00


Insulin Aspart (Novolog Insulin Pen) NOVOLOG *MILD* ALGORITHM WITH MEALS  


BEDTIME SC  Last administered on 3/11/19at 13:29; Admin Dose 1 UNIT;  Start 


3/7/19 at 18:00


Ascorbic Acid (Vitamin C) 500 mg DAILY PO  Last administered on 3/11/19at 08:27;


Admin Dose 500 MG;  Start 3/8/19 at 09:00


Docusate Sodium (Colace) 200 mg HS PO  Last administered on 3/10/19at 20:49; 


Admin Dose 200 MG;  Start 3/7/19 at 21:00


Ferrous Sulfate (Ferrous Sulfate (Ec)) 325 mg DAILY PO  Last administered on 


3/11/19at 08:27; Admin Dose 325 MG;  Start 3/8/19 at 09:00


Levothyroxine Sodium (Synthroid) 50 mcg BEFORE  BREAKFAST PO  Last administered 


on 3/11/19at 06:00; Admin Dose 50 MCG;  Start 3/8/19 at 07:00


Multivitamins Therapeutic (Theragran) 1 tab DAILY PO  Last administered on 


3/11/19at 08:27; Admin Dose 1 TAB;  Start 3/8/19 at 09:00


Sevelamer Carbonate (Renvela) 0.8 gm WITH  MEALS PO  Last administered on 


3/11/19at 13:29; Admin Dose 0.8 GM;  Start 3/7/19 at 18:00


Atorvastatin Calcium (Lipitor) 40 mg QHS PO  Last administered on 3/10/19at 


20:49; Admin Dose 40 MG;  Start 3/7/19 at 21:00


Vancomycin HCl (Vanco Iv Per Pharmacy) VANCOMYCIN PER PHARMACY PER PROTOCOL XX ;


 Start 3/8/19 at 07:30


Epoetin Connor (Epogen (Esrd)) 10,000 units MoWeFr@17 SC  Last administered on 


3/8/19at 18:18; Admin Dose 10,000 UNITS;  Start 3/8/19 at 17:00


Pantoprazole (Protonix Iv) 40 mg BID@06,18 IV  Last administered on 3/11/19at 


06:00; Admin Dose 40 MG;  Start 3/8/19 at 12:30


Lactulose (Enulose) 20 gm Q4 PO  Last administered on 3/11/19at 06:00; Admin 


Dose 20 GM;  Start 3/9/19 at 13:00


Carvedilol (Coreg) 3.125 mg BID PO  Last administered on 3/11/19at 08:30; Admin 


Dose 3.125 MG;  Start 3/9/19 at 15:00


Ondansetron HCl (Zofran Inj) 4 mg Q4H  PRN IV NAUSEA AND/OR VOMITING Last 


administered on 3/10/19at 14:05; Admin Dose 4 MG;  Start 3/10/19 at 14:00


Acetaminophen (Tylenol Tab) 500 mg Q6H  PRN PO MILD PAIN(1-3)OR ELEVATED TEMP;  


Start 3/10/19 at 17:00


Meropenem/Sodium Chloride 50 ml @  100 mls/hr Q24H IVPB  Last administered on 


3/11/19at 10:26; Admin Dose 100 MLS/HR;  Start 3/11/19 at 09:00











JORGE DAVID MD         Mar 11, 2019 16:39

## 2019-03-11 NOTE — PAC
Date/Time of Note


Date/Time of Note


DATE: 3/11/19 


TIME: 12:20





Post-Anesthesia Notes


Post-Anesthesia Note


Last documented vital signs





Vital Signs


  Date      Temp  Pulse  Resp  B/P (MAP)   Pulse Ox  O2          O2 Flow    FiO2


Time                                                 Delivery    Rate


   3/11/19          150


     09:34


   3/11/19                 18                    96                           21


     08:38


   3/11/19  98.3                   111/57


     07:15                           (75)


   3/10/19                                           Room Air


     15:57


    3/9/19                                                             2.0


     08:00





Activity:  WNL


Respiratory function:  WNL


Cardiovascular function:  WNL


Mental status:  Baseline


Pain reasonably controlled:  Yes


Hydration appropriate:  Yes


Nausea/Vomiting absent:  Yes


Comments


BP: 115/62


HR: 60


RR: 15


T: 98


SaO2: 96%











DO FRANCO MD                Mar 11, 2019 12:20

## 2019-03-11 NOTE — PREAC
Date/Time of Note


Date/Time of Note


DATE: 3/11/19 


TIME: 10:00





Anesthesia Eval and Record


Evaluation


Time Pre-Procedure Interview


DATE: 3/11/19 


TIME: 10:00


Age


68


Sex


male


NPO:  8 hrs


Preoperative diagnosis


anemia


Planned procedure


EGD





Past Medical History


Past Medical History:  Includes


Cardio:  HTN, Dyslipidemia, MI (NSTEMI), CAD ( ), CABG


Endo:  Diabetes


Pulm:  Other (pneumonia)


Renal:  ESRD on dialysis, BPH


Heme:  Anemia





Surgery & Anesthesia Issues


No known issue





Meds


Anticoagulation:  No


Beta Blocker within 24 hr:  Yes


Reason Beta Blocker not given:  Bradycarida, Hypotension


Reported Medications


Polyvinyl Alcohol (Tears Again) 15 Ml Drops, 15 ML OP DAILY PRN for DRY EYES, 


BOTTLE


   3/7/19


Ondansetron Hcl* (Zofran*) 4 Mg Tab, 4 MG PO Q6H PRN for NAUSEA AND OR VOMITING,


TAB


   3/7/19


Ascorbic Acid* (Vitamin C*) 500 Mg Capsule.sa, 500 MG PO DAILY, CAP


   3/7/19


Acetaminophen* (Acetaminophen*) 500 MG Extra Strength Tablet, 1000 MG PO Q4H  


PRN for MILD PAIN(1-3)OR ELEVATED TEMP, TAB


   3/7/19


Acetaminophen* (Acetaminophen*) 325 Mg Tablet, 325 MG PO Q4H PRN for PAIN AND OR


ELEVATED TEMP, #30 TAB


   3/7/19


Levothyroxine Sodium* (Levoxyl*) 50 Mcg Tablet, 50 MCG PO BEFORE BREAKFAST, #30 


TAB


   3/7/19


Sevelamer Carbonate* (Renvela*) 800 Mg Tablet, 0.8 GM PO WITH MEALS, TAB


   3/7/19


Multivitamins* (Theragran*) 1 Tab Tab, 1 TAB PO DAILY, TAB


   3/7/19


Atorvastatin Calcium* (Atorvastatin Calcium*) 20 Mg Tablet, 20 MG PO QHS, #30 


TAB


   3/7/19


Lisinopril* (Lisinopril*) 20 Mg Tablet, 20 MG PO DAILY for MON,WED,FRI,SUN., #30


TAB


   3/7/19


Atorvastatin Calcium* (Atorvastatin Calcium*) 20 Mg Tablet, 20 MG PO QHS, #30 


TAB


   3/7/19


Insulin Glargine* (Lantus*) 100 Unit/Ml Soln, 10 UNIT SC QHS, #1 VIAL


   3/7/19


Ferrous Sulfate* (Ferrous Sulfate*) 325 Mg Tabec, 325 MG PO DAILY, TAB


   3/7/19


Cranberry Extract (Cranberry) 425 Mg Capsule, 425 MG PO DAILY, CAP


   3/7/19


Carvedilol* (Coreg*) 6.25 Mg Tablet, 6.25 MG PO BID for TUE,THU,SAT, #60 TAB


   3/7/19


Docusate Sodium* (Docusate Sodium*) 100 Mg Capsule, 200 MG PO HS, #60 CAP


   3/7/19


Cyanocobalamin/FA/Pyridoxine (B Complex-Folic Acid Tablet) 1 Each Tablet, 1 TAB 


PO DAILY, TAB


   3/7/19


Aspirin* (Aspirin* Chew) 81 Mg Tab.chew, 81 MG PO DAILY, TAB.CHEW


   3/7/19





Current Medications


IV Flush (NS 3 ml) 3 ml PER PROTOCOL IV ;  Start 3/7/19 at 16:00


Levalbuterol (Xopenex Neb) 0.63 mg Q6H RESP  THERAPY HHN  Last administered on 


3/11/19at 08:38; Admin Dose 0.63 MG;  Start 3/7/19 at 20:00


Insulin Aspart (Novolog Insulin Pen) NOVOLOG *MILD* ALGORITHM WITH MEALS  


BEDTIME SC  Last administered on 3/10/19at 20:56; Admin Dose 2 UNIT;  Start 


3/7/19 at 18:00


Ascorbic Acid (Vitamin C) 500 mg DAILY PO  Last administered on 3/11/19at 08:27;


Admin Dose 500 MG;  Start 3/8/19 at 09:00


Docusate Sodium (Colace) 200 mg HS PO  Last administered on 3/10/19at 20:49; 


Admin Dose 200 MG;  Start 3/7/19 at 21:00


Ferrous Sulfate (Ferrous Sulfate (Ec)) 325 mg DAILY PO  Last administered on 


3/11/19at 08:27; Admin Dose 325 MG;  Start 3/8/19 at 09:00


Levothyroxine Sodium (Synthroid) 50 mcg BEFORE  BREAKFAST PO  Last administered 


on 3/11/19at 06:00; Admin Dose 50 MCG;  Start 3/8/19 at 07:00


Multivitamins Therapeutic (Theragran) 1 tab DAILY PO  Last administered on 


3/11/19at 08:27; Admin Dose 1 TAB;  Start 3/8/19 at 09:00


Sevelamer Carbonate (Renvela) 0.8 gm WITH  MEALS PO  Last administered on 


3/11/19at 08:27; Admin Dose 0.8 GM;  Start 3/7/19 at 18:00


Atorvastatin Calcium (Lipitor) 40 mg QHS PO  Last administered on 3/10/19at 


20:49; Admin Dose 40 MG;  Start 3/7/19 at 21:00


Vancomycin HCl (Vanco Iv Per Pharmacy) VANCOMYCIN PER PHARMACY PER PROTOCOL XX ;


 Start 3/8/19 at 07:30


Epoetin Connor (Epogen (Esrd)) 10,000 units MoWeFr@17 SC  Last administered on 


3/8/19at 18:18; Admin Dose 10,000 UNITS;  Start 3/8/19 at 17:00


Pantoprazole (Protonix Iv) 40 mg BID@06,18 IV  Last administered on 3/11/19at 


06:00; Admin Dose 40 MG;  Start 3/8/19 at 12:30


Lactulose (Enulose) 20 gm Q4 PO  Last administered on 3/11/19at 06:00; Admin 


Dose 20 GM;  Start 3/9/19 at 13:00


Carvedilol (Coreg) 3.125 mg BID PO  Last administered on 3/11/19at 08:30; Admin 


Dose 3.125 MG;  Start 3/9/19 at 15:00


Ondansetron HCl (Zofran Inj) 4 mg Q4H  PRN IV NAUSEA AND/OR VOMITING Last 


administered on 3/10/19at 14:05; Admin Dose 4 MG;  Start 3/10/19 at 14:00


Acetaminophen (Tylenol Tab) 500 mg Q6H  PRN PO MILD PAIN(1-3)OR ELEVATED TEMP;  


Start 3/10/19 at 17:00


Meropenem/Sodium Chloride 50 ml @  100 mls/hr Q24H IVPB ;  Start 3/11/19 at 


09:00


Meds reviewed:  Yes





Allergies


Coded Allergies:  


     No Known Allergy (Unverified , 3/7/19)


Allergies Reviewed:  Yes





Labs/Studies


Labs Reviewed:  Reviewed by anesthesiologist


Result Diagram:  


3/11/19 0546                                                                    


           3/11/19 0546





Laboratory Tests


3/11/19 05:46








Pregnancy test:  N/A





Pre-procedure Exam


Last vitals





Vital Signs


  Date      Temp  Pulse  Resp  B/P (MAP)   Pulse Ox  O2          O2 Flow    FiO2


Time                                                 Delivery    Rate


   3/11/19           69    18                    96                           21


     08:38


   3/11/19  98.3                   111/57


     07:15                           (75)


   3/10/19                                           Room Air


     15:57


    3/9/19                                                             2.0


     08:00





Airway:  Adequate mouth opening, Adequate thyromental dist


Mallampati:  Mallampati II


Teeth:  Normal


Lung:  Normal


Heart:  Normal





ASA Physical Status


ASA physical status:  4


Emergency:  None





Planned Anesthetic


General/MAC:  Mask





Planned Pain Management


Parenteral pain med





Pre-operative Attestations


Prior to commencing anesthesia and surgery, the patient was re-evaluated, there 


was verification of:


*The patient's identity


*The results of appropriate recent lab work and preoperative vital signs


*The above evaluation not changing prior to induction


*Anesthetic plan, risk benefits, alternative and complications discussed with 


patient/family; questions answered; patient/family understands, accepts and 


wishes to proceed.


 used











DO FRANCO MD                Mar 11, 2019 10:09

## 2019-03-11 NOTE — CONS
Assessment/Plan


Assessment/Plan


Hospital Course (Demo Recall)


- fever, diaphoresis and chills associated with dialysis prior to admission and 


again today, concerning for possible catheter related bloodstream infection. 


Another possibility is fever due to GIB


- ESRD on HD via HD catheter on R chest wall. Pt does not remember how old the 


catheter is


- GIB, scheduled for EGD on 3/11/2019


- DM


- CAD


- LLL infiltrate vs. atelectasis on CXR upon admission but Pt does not have resp


Sx


- s/p type 2 MI vs. demand ischemia due to acute on chronic anemia


- acute on chronic anemia


- prostatic hypertrophy


- hypothyroidism


- s/p coronary artery bypass grafting


- h/o previous MI


- h/o diabetic wound of RLE


- status post right BKA





Recommendations: 


- Pending: CXR from am (taken), Blood cultures from 3/7/19 (NGTD), Repeat blood 


cultures 3/11/19 (in process), procalcitonin, UA/Urine culture (pt agreed to 


send this if he urinates), 


- Ordered: Blood cultures x2 to be done with his next HD tomorrow 3/12/19 


- Continue IV vancomycin (3/8/2019-), renally dosed meropenem (3/11/2019-) 


Management d/w patient and with Dr. Forde.


Thank you





Consultation Date/Type/Reason


Admit Date/Time


Mar 7, 2019 at 13:32


Initial Consult Date


3/10/19


Type of Consult


ID


Requesting Provider:  JEREMY PADILLA NP


Date/Time of Note


DATE: 3/11/19 


TIME: 09:35





24 HR Interval Summary


Free Text/Dictation


Patient stated he did not make any urine yet for ua/cx.


HD tomorrow. Aware and agrees to blood cxs from HD line with HD tomorrow.


Denies fevers, chills, night sweats, sob, cough, n/v/d, abd pain, dysuria, pr


uritis, rash.





Exam/Review of Systems


Exam


Vitals





Vital Signs


  Date      Temp  Pulse  Resp  B/P (MAP)   Pulse Ox  O2          O2 Flow    FiO2


Time                                                 Delivery    Rate


   3/11/19           69    18                    96                           21


     08:38


   3/11/19  98.3                   111/57


     07:15                           (75)


   3/10/19                                           Room Air


     15:57


    3/9/19                                                             2.0


     08:00








Intake and Output





3/10/19


3/10/19


3/11/19





1515:00


23:00


07:00





IntakeIntake Total


1690 ml


250 ml





OutputOutput Total


2400 ml





BalanceBalance


-2400 ml


1690 ml


250 ml











Allergies


Coded Allergies


  No Known Allergy (Unverified3/7/19)


Constitutional:  alert, oriented, well developed


Psych:  no complaints, nl mood/affect


Head:  normocephalic


Eyes:  nl conjunctiva, nl lids, nl sclera


ENMT:  nl external ears & nose, nl nasal mucosa & septum, mucosa pink and moist


Neck:  supple, non-tender


Respiratory:  clear to auscultation, normal air movement


Cardiovascular:  regular rate and rhythm, nl pulses


Gastrointestinal:  soft, non-tender, bowel sounds (normoactive )


Genitourinary - Male:  other (Right chest HD catheter - site is c/d/i without 


erythema, drainage, or tenderness around the catheter site. )


Extremities:  other (R healed BKA)


Neurological:  CNS II-XII intact, nl mental status, nl speech, nl strength


Skin:  nl turgor; 


   No rash or lesions





Results


Result Diagram:  


3/11/19 0546                                                                    


           3/11/19 0546





Results 24hrs





Laboratory Tests


Test
              3/10/19
12:06     3/10/19
17:25  3/10/19
20:47  3/11/19
05:46


Bedside Glucose            137               170           254  H


White Blood Count                                                       17.9  #H


Red Blood Count                                                          3.04  L


Hemoglobin                                                                9.8  L


Hematocrit                                                               30.1  L


Mean Corpuscular                                                          99.0


Volume


Mean Corpuscular                                                          32.2


Hemoglobin


Mean Corpuscular   
              
                 
                    32.6  



Hemoglobin
Concen


t


Red Cell                                                                 17.2  H


Distribution


Width


Platelet Count                                                             140


Mean Platelet                                                             10.2


Volume


Immature                                                                1.000  H


Granulocytes %


Neutrophils %                                                            88.7  H


Lymphocytes %                                                             5.4  L


Monocytes %                                                                4.3


Eosinophils %                                                              0.3


Basophils %                                                                0.3


Nucleated Red                                                             0.1  H


Blood Cells %


Immature                                                                0.170  H


Granulocytes #


Neutrophils #                                                            15.9  H


Lymphocytes #                                                              1.0


Monocytes #                                                                0.8


Eosinophils #                                                              0.1


Basophils #                                                                0.1


Nucleated Red                                                              0.0


Blood Cells #


Sodium Level                                                              145  H


Potassium Level                                                            4.0


Chloride Level                                                             103


Carbon Dioxide                                                              25


Level


Anion Gap                                                                  17  H


Blood Urea                                                                37  #H


Nitrogen


Creatinine                                                               5.19  H


Est Glomerular     
              
                 
                     11  L



Filtrat


Rate
mL/min


Glucose Level                                                              153


Lactic Acid Level                                                          1.1


Calcium Level                                                              9.0


Phosphorus Level                                                           3.5


Magnesium Level                                                            2.4


Total Bilirubin                                                            0.5


Direct Bilirubin                                                          0.00


Indirect                                                                   0.5


Bilirubin


Aspartate Amino    
              
                 
                      25  



Transf
(AST/SGOT)


Alanine            
              
                 
                      33  



Aminotransferase



(ALT/SGPT)


Alkaline                                                                  164  H


Phosphatase


Creatine Kinase                                                             29


Creatine Kinase                                                            6.8


Index


Creatinine Kinase                                                         1.98


MB (Mass)


Troponin I                                                             1.380  *H


Total Protein                                                              7.5


Albumin                                                                    4.0


Globulin                                                                 3.50  H


Albumin/Globulin                                                          1.14


Ratio


Test
              3/11/19
08:15     3/11/19
09:10  
              



Bedside Glucose            136


Lab Scanned        
              BLOOD
TRANSFUSIO  
              



Report
                           N








Imaging


Imaging


Abd XR 3/10/19


IMPRESSION:


No radiographic evidence of small bowel obstruction





Medications


Medication





Current Medications


IV Flush (NS 3 ml) 3 ml PER PROTOCOL IV ;  Start 3/7/19 at 16:00


Levalbuterol (Xopenex Neb) 0.63 mg Q6H RESP  THERAPY HHN  Last administered on 


3/11/19at 08:38; Admin Dose 0.63 MG;  Start 3/7/19 at 20:00


Insulin Aspart (Novolog Insulin Pen) NOVOLOG *MILD* ALGORITHM WITH MEALS  


BEDTIME SC  Last administered on 3/10/19at 20:56; Admin Dose 2 UNIT;  Start 


3/7/19 at 18:00


Ascorbic Acid (Vitamin C) 500 mg DAILY PO  Last administered on 3/11/19at 08:27;


Admin Dose 500 MG;  Start 3/8/19 at 09:00


Docusate Sodium (Colace) 200 mg HS PO  Last administered on 3/10/19at 20:49; 


Admin Dose 200 MG;  Start 3/7/19 at 21:00


Ferrous Sulfate (Ferrous Sulfate (Ec)) 325 mg DAILY PO  Last administered on 


3/11/19at 08:27; Admin Dose 325 MG;  Start 3/8/19 at 09:00


Levothyroxine Sodium (Synthroid) 50 mcg BEFORE  BREAKFAST PO  Last administered 


on 3/11/19at 06:00; Admin Dose 50 MCG;  Start 3/8/19 at 07:00


Multivitamins Therapeutic (Theragran) 1 tab DAILY PO  Last administered on 


3/11/19at 08:27; Admin Dose 1 TAB;  Start 3/8/19 at 09:00


Sevelamer Carbonate (Renvela) 0.8 gm WITH  MEALS PO  Last administered on 


3/11/19at 08:27; Admin Dose 0.8 GM;  Start 3/7/19 at 18:00


Atorvastatin Calcium (Lipitor) 40 mg QHS PO  Last administered on 3/10/19at 


20:49; Admin Dose 40 MG;  Start 3/7/19 at 21:00


Vancomycin HCl (Vanco Iv Per Pharmacy) VANCOMYCIN PER PHARMACY PER PROTOCOL XX ;


 Start 3/8/19 at 07:30


Epoetin Connor (Epogen (Esrd)) 10,000 units MoWeFr@17 SC  Last administered on 


3/8/19at 18:18; Admin Dose 10,000 UNITS;  Start 3/8/19 at 17:00


Pantoprazole (Protonix Iv) 40 mg BID@06,18 IV  Last administered on 3/11/19at 


06:00; Admin Dose 40 MG;  Start 3/8/19 at 12:30


Lactulose (Enulose) 20 gm Q4 PO  Last administered on 3/11/19at 06:00; Admin 


Dose 20 GM;  Start 3/9/19 at 13:00


Carvedilol (Coreg) 3.125 mg BID PO  Last administered on 3/11/19at 08:30; Admin 


Dose 3.125 MG;  Start 3/9/19 at 15:00


Ondansetron HCl (Zofran Inj) 4 mg Q4H  PRN IV NAUSEA AND/OR VOMITING Last admi


nistered on 3/10/19at 14:05; Admin Dose 4 MG;  Start 3/10/19 at 14:00


Acetaminophen (Tylenol Tab) 500 mg Q6H  PRN PO MILD PAIN(1-3)OR ELEVATED TEMP;  


Start 3/10/19 at 17:00


Meropenem/Sodium Chloride 50 ml @  100 mls/hr Q24H IVPB ;  Start 3/11/19 at 


09:00











QI COOPER NP             Mar 11, 2019 09:44

## 2019-03-11 NOTE — PN
DATE:  03/10/2019

 

 

SUBJECTIVE:  The patient is admitted with a substernal discomfort and severe anemia.  GI consultation
 was requested because of the anemia; however, I was told by the nurse that patient vomited coffee-gr
ound material.

 

PHYSICAL EXAMINATION:

GENERAL:  The patient seems to be alert.

VITAL SIGNS:  Pulse is 86, blood pressure is 110/78.

CARDIOVASCULAR:  Normal heart sounds.

RESPIRATORY:  Normal breath sounds.

ABDOMEN:  Showed soft abdomen with no palpable masses, no tenderness, no distention.

 

LABORATORY WORKUP:  Shows WBC is 8500, hemoglobin 9.5.  Potassium 4.3.  Troponin has come down to 2.0
2.

 

CLINICAL IMPRESSION:  The patient vomited and no evidence of obstruction clinically of the abdomen.  
No tenderness to consider any inflammatory process; however rule out pancreatitis, cholecystitis, bow
el obstruction.  Rule out upper gastrointestinal bleeding due to peptic ulcer disease.

 

PLAN:  At this time, I recommend upper endoscopy, KUB, amylase and lipase and Dr. Ivan already linder
s cleared the patient considering the fact that the patient is of high risk.

 

 

Dictated By: BUCK NEGRETE/SEJAL

DD:    03/10/2019 15:08:13

DT:    03/10/2019 15:49:06

Conf#: [id number]

DID#:  [id number]

CC: STEPHANE SENA MD;*EndCC*

## 2019-03-11 NOTE — CONS
Assessment/Plan


Assessment/Plan


Hospital Course (Demo Recall)


SIRS


Preserved ejection fraction


Myocardial infarction


Acute blood loss anemia


End-stage renal disease on hemodialysis


Diabetes


History of open heart surgery-likely CABG








-Troponins are trending down.  Given concern for GI bleed and acute blood loss 


anemia, no antiplatelet therapy or anticoagulants at the current time


-Continue statin therapy


-Titrate beta-blockers heart rate and blood pressure permits


-Fluid management via hemodialysis as per nephrology





Consultation Date/Type/Reason


Admit Date/Time


Mar 7, 2019 at 13:32


Initial Consult Date





Type of Consult


Cardiology


Requesting Provider:  JEREMY PADILLA NP


Date/Time of Note


DATE: 3/11/19 


TIME: 16:35





24 HR Interval Summary


Free Text/Dictation


Denies chest pain, shortness of breath or dizziness





Exam/Review of Systems


Vital Signs


Vitals





Vital Signs


  Date      Temp  Pulse  Resp  B/P (MAP)   Pulse Ox  O2          O2 Flow    FiO2


Time                                                 Delivery    Rate


   3/11/19  98.2     77    19      128/60        96


     15:32                           (82)


   3/11/19                                                                    21


     13:35


   3/11/19                                           Room Air


     13:13


    3/9/19                                                             2.0


     08:00








Intake and Output





3/10/19


3/10/19


3/11/19





1515:00


23:00


07:00





IntakeIntake Total


1690 ml


250 ml





OutputOutput Total


2400 ml





BalanceBalance


-2400 ml


1690 ml


250 ml














Exam


Constitutional:  alert, oriented (Following commands, no apparent distress)


Head:  normocephalic


Respiratory:  other (Coarse breath sounds bilaterally, no wheezing)


Cardiovascular:  regular rate and rhythm (S1-S2 heard)


Gastrointestinal:  soft, non-tender, bowel sounds


Extremities:  other (Amputation)





Labs


Result Diagram:  


3/11/19 0546                                                                    


           3/11/19 0546





Results 24hrs





Laboratory Tests


Test
                 3/10/19
17:25  3/10/19
20:47  3/11/19
05:46  3/11/19
08:15


Bedside Glucose               170           254  H                         136


White Blood Count                                        17.9  #H


Red Blood Count                                           3.04  L


Hemoglobin                                                 9.8  L


Hematocrit                                                30.1  L


Mean Corpuscular                                           99.0


Volume


Mean Corpuscular                                           32.2


Hemoglobin


Mean Corpuscular   
                 
                    32.6  
  



Hemoglobin
Concen


t


Red Cell                                                  17.2  H


Distribution


Width


Platelet Count                                              140


Mean Platelet                                              10.2


Volume


Immature                                                 1.000  H


Granulocytes %


Neutrophils %                                             88.7  H


Lymphocytes %                                              5.4  L


Monocytes %                                                 4.3


Eosinophils %                                               0.3


Basophils %                                                 0.3


Nucleated Red                                              0.1  H


Blood Cells %


Immature                                                 0.170  H


Granulocytes #


Neutrophils #                                             15.9  H


Lymphocytes #                                               1.0


Monocytes #                                                 0.8


Eosinophils #                                               0.1


Basophils #                                                 0.1


Nucleated Red                                               0.0


Blood Cells #


Sodium Level                                               145  H


Potassium Level                                             4.0


Chloride Level                                              103


Carbon Dioxide                                               25


Level


Anion Gap                                                   17  H


Blood Urea                                                 37  #H


Nitrogen


Creatinine                                                5.19  H


Est Glomerular     
                 
                     11  L
  



Filtrat


Rate
mL/min


Glucose Level                                               153


Lactic Acid Level                                           1.1


Calcium Level                                               9.0


Phosphorus Level                                            3.5


Magnesium Level                                             2.4


Total Bilirubin                                             0.5


Direct Bilirubin                                           0.00


Indirect                                                    0.5


Bilirubin


Aspartate Amino    
                 
                      25  
  



Transf
(AST/SGOT)


Alanine            
                 
                      33  
  



Aminotransferase



(ALT/SGPT)


Alkaline                                                   164  H


Phosphatase


Creatine Kinase                                              29


Creatine Kinase                                             6.8


Index


Creatinine Kinase                                          1.98


MB (Mass)


Troponin I                                              1.380  *H


Total Protein                                               7.5


Albumin                                                     4.0


Globulin                                                  3.50  H


Albumin/Globulin                                           1.14


Ratio


Test
                 3/11/19
09:10  3/11/19
13:21  
              



Lab Scanned        BLOOD
TRANSFUSIO  
              
              



Report
            N


Bedside Glucose                              142








Medications


Medications





Current Medications


IV Flush (NS 3 ml) 3 ml PER PROTOCOL IV ;  Start 3/7/19 at 16:00


Levalbuterol (Xopenex Neb) 0.63 mg Q6H RESP  THERAPY HHN  Last administered on 


3/11/19at 13:35; Admin Dose 0.63 MG;  Start 3/7/19 at 20:00


Insulin Aspart (Novolog Insulin Pen) NOVOLOG *MILD* ALGORITHM WITH MEALS  


BEDTIME SC  Last administered on 3/11/19at 13:29; Admin Dose 1 UNIT;  Start 


3/7/19 at 18:00


Ascorbic Acid (Vitamin C) 500 mg DAILY PO  Last administered on 3/11/19at 08:27;


Admin Dose 500 MG;  Start 3/8/19 at 09:00


Docusate Sodium (Colace) 200 mg HS PO  Last administered on 3/10/19at 20:49; 


Admin Dose 200 MG;  Start 3/7/19 at 21:00


Ferrous Sulfate (Ferrous Sulfate (Ec)) 325 mg DAILY PO  Last administered on 


3/11/19at 08:27; Admin Dose 325 MG;  Start 3/8/19 at 09:00


Levothyroxine Sodium (Synthroid) 50 mcg BEFORE  BREAKFAST PO  Last administered 


on 3/11/19at 06:00; Admin Dose 50 MCG;  Start 3/8/19 at 07:00


Multivitamins Therapeutic (Theragran) 1 tab DAILY PO  Last administered on 


3/11/19at 08:27; Admin Dose 1 TAB;  Start 3/8/19 at 09:00


Sevelamer Carbonate (Renvela) 0.8 gm WITH  MEALS PO  Last administered on 


3/11/19at 13:29; Admin Dose 0.8 GM;  Start 3/7/19 at 18:00


Atorvastatin Calcium (Lipitor) 40 mg QHS PO  Last administered on 3/10/19at 


20:49; Admin Dose 40 MG;  Start 3/7/19 at 21:00


Vancomycin HCl (Vanco Iv Per Pharmacy) VANCOMYCIN PER PHARMACY PER PROTOCOL XX ;


 Start 3/8/19 at 07:30


Epoetin Connor (Epogen (Esrd)) 10,000 units MoWeFr@17 SC  Last administered on 


3/8/19at 18:18; Admin Dose 10,000 UNITS;  Start 3/8/19 at 17:00


Pantoprazole (Protonix Iv) 40 mg BID@06,18 IV  Last administered on 3/11/19at 


06:00; Admin Dose 40 MG;  Start 3/8/19 at 12:30


Lactulose (Enulose) 20 gm Q4 PO  Last administered on 3/11/19at 06:00; Admin 


Dose 20 GM;  Start 3/9/19 at 13:00


Carvedilol (Coreg) 3.125 mg BID PO  Last administered on 3/11/19at 08:30; Admin 


Dose 3.125 MG;  Start 3/9/19 at 15:00


Ondansetron HCl (Zofran Inj) 4 mg Q4H  PRN IV NAUSEA AND/OR VOMITING Last 


administered on 3/10/19at 14:05; Admin Dose 4 MG;  Start 3/10/19 at 14:00


Acetaminophen (Tylenol Tab) 500 mg Q6H  PRN PO MILD PAIN(1-3)OR ELEVATED TEMP;  


Start 3/10/19 at 17:00


Meropenem/Sodium Chloride 50 ml @  100 mls/hr Q24H IVPB  Last administered on 


3/11/19at 10:26; Admin Dose 100 MLS/HR;  Start 3/11/19 at 09:00











Steve Wesley DO           Mar 11, 2019 16:37

## 2019-03-11 NOTE — PN
DATE:  03/11/2019

 

 

SUBJECTIVE:  The patient is stable.  No events overnight.

 

OBJECTIVE:

VITAL SIGNS:  Blood pressure is 111/57, respirations 18, pulse 63, temperature 

98.3.

HEENT:  Head is normocephalic.

NECK:  Supple.

HEART:  Regular rate.

LUNGS:  Show diminished breath sounds at the base.

ABDOMEN:  Soft, nontender to palpation without rebound or guarding.

EXTREMITIES:  Negative for clubbing, cyanosis, no edema.

DERMATOLOGIC:  No rashes.

MUSCULOSKELETAL:  No joint effusion.

NEUROLOGIC:  No change in exam.

 

MEDICATIONS:  Reviewed.

 

LABORATORY DATA:  Shows white count 17.9, hemoglobin 9.8, platelet count 140.  

Sodium 145, BUN 37, creatinine 5.19.

 

ASSESSMENT AND PLAN:

1.  End-stage renal disease.  The patient had hemodialysis yesterday, tolerated 

well.  Plan is for dialysis tomorrow.

2.  Anemia.  Monitor hemoglobin and hematocrit levels.  Continue Epogen.

3.  Mineral bone disorder, monitor calcium and phosphorus levels.  

4.  Questionable gastrointestinal bleed.  The patient is pending EGD.

5. SIRS.  possible sepsis, source unclear need to r/o line infection.  continue 

work up per ID.  check blood cultures from permacath. 

5.  Acute respiratory failure, improved.  Continue to monitor.

6.  Elevated troponin, possible non-STEMI type 1 versus type 2.  Continue to 

monitor.  Workup is ongoing per cardiology.

7.  Encephalopathy, etiology is toxic metabolic.

8.  Diabetes.  Continue current insulin regimen.

9.  Coronary artery disease.  Continue current treatment plan.

10.  Hypothyroidism.  Continue Synthroid.

11.  Dyslipidemia.  Continue statin therapy.

12.  Hypertension.  Continue current blood pressure regimen.



 

 

Dictated By: ELODIA RAMHAN DO

 

NR/NTS

DD:    03/11/2019 08:36:42

DT:    03/11/2019 10:21:20

Conf#: [id number]

DID#:  [id number]

CC: BUCK ABDI MD; JORGE DAVID MD; STEPHANE SENA MD;*EndCC*

MTDD

## 2019-03-12 VITALS — SYSTOLIC BLOOD PRESSURE: 153 MMHG | DIASTOLIC BLOOD PRESSURE: 63 MMHG | HEART RATE: 69 BPM

## 2019-03-12 VITALS — DIASTOLIC BLOOD PRESSURE: 68 MMHG | HEART RATE: 79 BPM | SYSTOLIC BLOOD PRESSURE: 122 MMHG

## 2019-03-12 VITALS — SYSTOLIC BLOOD PRESSURE: 134 MMHG | HEART RATE: 66 BPM | DIASTOLIC BLOOD PRESSURE: 71 MMHG

## 2019-03-12 VITALS — RESPIRATION RATE: 19 BRPM | SYSTOLIC BLOOD PRESSURE: 112 MMHG | HEART RATE: 78 BPM | DIASTOLIC BLOOD PRESSURE: 58 MMHG

## 2019-03-12 VITALS — HEART RATE: 83 BPM | RESPIRATION RATE: 18 BRPM | SYSTOLIC BLOOD PRESSURE: 115 MMHG | DIASTOLIC BLOOD PRESSURE: 59 MMHG

## 2019-03-12 VITALS — HEART RATE: 67 BPM | DIASTOLIC BLOOD PRESSURE: 68 MMHG | SYSTOLIC BLOOD PRESSURE: 131 MMHG | RESPIRATION RATE: 19 BRPM

## 2019-03-12 VITALS — DIASTOLIC BLOOD PRESSURE: 53 MMHG | RESPIRATION RATE: 18 BRPM | HEART RATE: 76 BPM | SYSTOLIC BLOOD PRESSURE: 101 MMHG

## 2019-03-12 VITALS — SYSTOLIC BLOOD PRESSURE: 148 MMHG | HEART RATE: 64 BPM | DIASTOLIC BLOOD PRESSURE: 55 MMHG

## 2019-03-12 VITALS — SYSTOLIC BLOOD PRESSURE: 124 MMHG | DIASTOLIC BLOOD PRESSURE: 71 MMHG | HEART RATE: 68 BPM

## 2019-03-12 VITALS — SYSTOLIC BLOOD PRESSURE: 97 MMHG | HEART RATE: 68 BPM | RESPIRATION RATE: 18 BRPM | DIASTOLIC BLOOD PRESSURE: 42 MMHG

## 2019-03-12 VITALS — SYSTOLIC BLOOD PRESSURE: 109 MMHG | DIASTOLIC BLOOD PRESSURE: 57 MMHG | HEART RATE: 79 BPM

## 2019-03-12 VITALS — SYSTOLIC BLOOD PRESSURE: 154 MMHG | HEART RATE: 69 BPM | DIASTOLIC BLOOD PRESSURE: 42 MMHG

## 2019-03-12 VITALS — HEART RATE: 62 BPM | SYSTOLIC BLOOD PRESSURE: 131 MMHG | DIASTOLIC BLOOD PRESSURE: 68 MMHG

## 2019-03-12 VITALS — SYSTOLIC BLOOD PRESSURE: 136 MMHG | DIASTOLIC BLOOD PRESSURE: 63 MMHG | HEART RATE: 67 BPM | RESPIRATION RATE: 16 BRPM

## 2019-03-12 VITALS — RESPIRATION RATE: 18 BRPM | DIASTOLIC BLOOD PRESSURE: 56 MMHG | SYSTOLIC BLOOD PRESSURE: 115 MMHG | HEART RATE: 64 BPM

## 2019-03-12 VITALS — DIASTOLIC BLOOD PRESSURE: 68 MMHG | SYSTOLIC BLOOD PRESSURE: 116 MMHG | HEART RATE: 68 BPM

## 2019-03-12 VITALS — DIASTOLIC BLOOD PRESSURE: 54 MMHG | HEART RATE: 69 BPM | SYSTOLIC BLOOD PRESSURE: 156 MMHG

## 2019-03-12 VITALS — HEART RATE: 62 BPM | SYSTOLIC BLOOD PRESSURE: 131 MMHG | DIASTOLIC BLOOD PRESSURE: 64 MMHG | RESPIRATION RATE: 18 BRPM

## 2019-03-12 VITALS — SYSTOLIC BLOOD PRESSURE: 132 MMHG | DIASTOLIC BLOOD PRESSURE: 67 MMHG | HEART RATE: 66 BPM

## 2019-03-12 VITALS — RESPIRATION RATE: 18 BRPM | DIASTOLIC BLOOD PRESSURE: 65 MMHG | SYSTOLIC BLOOD PRESSURE: 117 MMHG | HEART RATE: 65 BPM

## 2019-03-12 VITALS — HEART RATE: 69 BPM

## 2019-03-12 VITALS — SYSTOLIC BLOOD PRESSURE: 118 MMHG | HEART RATE: 63 BPM | DIASTOLIC BLOOD PRESSURE: 66 MMHG

## 2019-03-12 VITALS — HEART RATE: 79 BPM

## 2019-03-12 VITALS — HEART RATE: 71 BPM

## 2019-03-12 LAB
ADD MAN DIFF?: NO
ADD UMIC: YES
ALANINE AMINOTRANSFERASE: 31 IU/L (ref 13–69)
ALBUMIN/GLOBULIN RATIO: 1.05
ALBUMIN: 3.9 G/DL (ref 3.3–4.9)
ALKALINE PHOSPHATASE: 149 IU/L (ref 42–121)
ANION GAP: 19 (ref 5–13)
ASPARTATE AMINO TRANSFERASE: 18 IU/L (ref 15–46)
BASOPHIL #: 0 10^3/UL (ref 0–0.1)
BASOPHILS %: 0.3 % (ref 0–2)
BILIRUBIN,DIRECT: 0 MG/DL (ref 0–0.2)
BILIRUBIN,TOTAL: 0.3 MG/DL (ref 0.2–1.3)
BLOOD UREA NITROGEN: 49 MG/DL (ref 7–20)
CALCIUM: 9 MG/DL (ref 8.4–10.2)
CARBON DIOXIDE: 24 MMOL/L (ref 21–31)
CHLORIDE: 100 MMOL/L (ref 97–110)
CREATININE: 7.09 MG/DL (ref 0.61–1.24)
EOSINOPHILS #: 0.1 10^3/UL (ref 0–0.5)
EOSINOPHILS %: 1.2 % (ref 0–7)
GLOBULIN: 3.7 G/DL (ref 1.3–3.2)
GLUCOSE: 161 MG/DL (ref 70–220)
HEMATOCRIT: 30.2 % (ref 42–52)
HEMOGLOBIN: 9.8 G/DL (ref 14–18)
IMMATURE GRANS #M: 0.11 10^3/UL (ref 0–0.03)
IMMATURE GRANS % (M): 0.9 % (ref 0–0.43)
LYMPHOCYTES #: 1 10^3/UL (ref 0.8–2.9)
LYMPHOCYTES %: 8.5 % (ref 15–51)
MAGNESIUM: 2.5 MG/DL (ref 1.7–2.5)
MEAN CORPUSCULAR HEMOGLOBIN: 32.3 PG (ref 29–33)
MEAN CORPUSCULAR HGB CONC: 32.5 G/DL (ref 32–37)
MEAN CORPUSCULAR VOLUME: 99.7 FL (ref 82–101)
MEAN PLATELET VOLUME: 10 FL (ref 7.4–10.4)
MONOCYTE #: 0.7 10^3/UL (ref 0.3–0.9)
MONOCYTES %: 5.6 % (ref 0–11)
NEUTROPHIL #: 9.8 10^3/UL (ref 1.6–7.5)
NEUTROPHILS %: 83.5 % (ref 39–77)
NUCLEATED RED BLOOD CELLS #: 0 10^3/UL (ref 0–0)
NUCLEATED RED BLOOD CELLS%: 0 /100WBC (ref 0–0)
PHOSPHORUS: 4.8 MG/DL (ref 2.5–4.9)
PLATELET COUNT: 151 10^3/UL (ref 140–415)
POTASSIUM: 4.2 MMOL/L (ref 3.5–5.1)
RED BLOOD COUNT: 3.03 10^6/UL (ref 4.7–6.1)
RED CELL DISTRIBUTION WIDTH: 17.2 % (ref 11.5–14.5)
SODIUM: 143 MMOL/L (ref 135–144)
TOTAL PROTEIN: 7.6 G/DL (ref 6.1–8.1)
UR ASCORBIC ACID: NEGATIVE MG/DL
UR BACTERIA: (no result) /HPF
UR BILIRUBIN (DIP): NEGATIVE MG/DL
UR BLOOD (DIP): NEGATIVE MG/DL
UR CLARITY: (no result)
UR COLOR: (no result)
UR GLUCOSE (DIP): NEGATIVE MG/DL
UR KETONES (DIP): (no result) MG/DL
UR LEUKOCYTE ESTERASE (DIP): (no result) LEU/UL
UR NITRITE (DIP): NEGATIVE MG/DL
UR NONSQUAMOUS EPITHELIAL CELL: 1 /HPF
UR PH (DIP): 5 (ref 5–9)
UR RBC: 27 /HPF (ref 0–5)
UR SPECIFIC GRAVITY (DIP): 1.03 (ref 1–1.03)
UR SQUAMOUS EPITHELIAL CELL: (no result) /HPF
UR TOTAL PROTEIN (DIP): (no result) MG/DL
UR UROBILINOGEN (DIP): NEGATIVE MG/DL
UR WBC: 56 /HPF (ref 0–5)
VANCOMYCIN,RANDOM: 18.5 UG/ML
WHITE BLOOD COUNT: 11.7 10^3/UL (ref 4.8–10.8)

## 2019-03-12 RX ADMIN — SEVELAMER CARBONATE 1 GM: 800 POWDER, FOR SUSPENSION ORAL at 17:03

## 2019-03-12 RX ADMIN — LEVALBUTEROL HYDROCHLORIDE 1 MG: 0.63 SOLUTION RESPIRATORY (INHALATION) at 16:53

## 2019-03-12 RX ADMIN — THERA TABS 1 TAB: TAB at 08:55

## 2019-03-12 RX ADMIN — PANTOPRAZOLE SODIUM 1 MG: 40 INJECTION, POWDER, FOR SOLUTION INTRAVENOUS at 17:03

## 2019-03-12 RX ADMIN — DOCUSATE SODIUM SCH MG: 100 CAPSULE, LIQUID FILLED ORAL at 20:48

## 2019-03-12 RX ADMIN — SEVELAMER CARBONATE 1 GM: 800 POWDER, FOR SUSPENSION ORAL at 08:55

## 2019-03-12 RX ADMIN — LEVALBUTEROL HYDROCHLORIDE 1 MG: 0.63 SOLUTION RESPIRATORY (INHALATION) at 08:54

## 2019-03-12 RX ADMIN — SEVELAMER CARBONATE SCH GM: 800 POWDER, FOR SUSPENSION ORAL at 11:10

## 2019-03-12 RX ADMIN — LEVOTHYROXINE SODIUM 1 MCG: 50 TABLET ORAL at 06:08

## 2019-03-12 RX ADMIN — DOCUSATE SODIUM 1 MG: 100 CAPSULE, LIQUID FILLED ORAL at 20:48

## 2019-03-12 RX ADMIN — LEVALBUTEROL HYDROCHLORIDE SCH MG: 0.63 SOLUTION RESPIRATORY (INHALATION) at 08:54

## 2019-03-12 RX ADMIN — PANTOPRAZOLE SODIUM SCH MG: 40 INJECTION, POWDER, FOR SOLUTION INTRAVENOUS at 06:08

## 2019-03-12 RX ADMIN — FERROUS SULFATE TAB 325 MG (65 MG ELEMENTAL FE) 1 MG: 325 (65 FE) TAB at 08:55

## 2019-03-12 RX ADMIN — INSULIN ASPART 1 UNIT: 100 INJECTION, SOLUTION INTRAVENOUS; SUBCUTANEOUS at 11:10

## 2019-03-12 RX ADMIN — SEVELAMER CARBONATE SCH GM: 800 POWDER, FOR SUSPENSION ORAL at 17:03

## 2019-03-12 RX ADMIN — INSULIN ASPART 1 UNIT: 100 INJECTION, SOLUTION INTRAVENOUS; SUBCUTANEOUS at 17:57

## 2019-03-12 RX ADMIN — MEROPENEM 1 MLS/HR: 500 INJECTION INTRAVENOUS at 08:57

## 2019-03-12 RX ADMIN — LEVALBUTEROL HYDROCHLORIDE SCH MG: 0.63 SOLUTION RESPIRATORY (INHALATION) at 00:44

## 2019-03-12 RX ADMIN — FERROUS SULFATE TAB 325 MG (65 MG ELEMENTAL FE) SCH MG: 325 (65 FE) TAB at 08:55

## 2019-03-12 RX ADMIN — LEVALBUTEROL HYDROCHLORIDE SCH MG: 0.63 SOLUTION RESPIRATORY (INHALATION) at 16:53

## 2019-03-12 RX ADMIN — PANTOPRAZOLE SODIUM SCH MG: 40 INJECTION, POWDER, FOR SOLUTION INTRAVENOUS at 17:03

## 2019-03-12 RX ADMIN — ATORVASTATIN CALCIUM 1 MG: 40 TABLET, FILM COATED ORAL at 20:48

## 2019-03-12 RX ADMIN — ATORVASTATIN CALCIUM SCH MG: 40 TABLET, FILM COATED ORAL at 20:48

## 2019-03-12 RX ADMIN — PANTOPRAZOLE SODIUM 1 MG: 40 INJECTION, POWDER, FOR SOLUTION INTRAVENOUS at 06:08

## 2019-03-12 RX ADMIN — OXYCODONE HYDROCHLORIDE AND ACETAMINOPHEN SCH MG: 500 TABLET ORAL at 08:55

## 2019-03-12 RX ADMIN — SEVELAMER CARBONATE SCH GM: 800 POWDER, FOR SUSPENSION ORAL at 08:55

## 2019-03-12 RX ADMIN — INSULIN ASPART 1 UNIT: 100 INJECTION, SOLUTION INTRAVENOUS; SUBCUTANEOUS at 08:42

## 2019-03-12 RX ADMIN — INSULIN ASPART 1 UNIT: 100 INJECTION, SOLUTION INTRAVENOUS; SUBCUTANEOUS at 20:54

## 2019-03-12 RX ADMIN — HEPARIN SODIUM 1 UNIT: 1000 INJECTION, SOLUTION INTRAVENOUS; SUBCUTANEOUS at 12:52

## 2019-03-12 RX ADMIN — SEVELAMER CARBONATE 1 GM: 800 POWDER, FOR SUSPENSION ORAL at 11:10

## 2019-03-12 RX ADMIN — THERA TABS SCH TAB: TAB at 08:55

## 2019-03-12 RX ADMIN — LEVOTHYROXINE SODIUM SCH MCG: 50 TABLET ORAL at 06:08

## 2019-03-12 RX ADMIN — OXYCODONE HYDROCHLORIDE AND ACETAMINOPHEN 1 MG: 500 TABLET ORAL at 08:55

## 2019-03-12 RX ADMIN — LEVALBUTEROL HYDROCHLORIDE 1 MG: 0.63 SOLUTION RESPIRATORY (INHALATION) at 00:44

## 2019-03-12 NOTE — CONS
Assessment/Plan


Assessment/Plan


Hospital Course (Demo Recall)


SIRS


Preserved ejection fraction


Myocardial infarction


Acute blood loss anemia


End-stage renal disease on hemodialysis


Diabetes


History of open heart surgery-likely CABG





-Continue statin therapy


-Titrate beta-blockers heart rate and blood pressure permits


-No antiplatelet or anticoagulant therapy secondary to severe blood loss anemia


-Fluid management via hemodialysis as per nephrology





Consultation Date/Type/Reason


Admit Date/Time


Mar 7, 2019 at 13:32


Initial Consult Date





Type of Consult


Cardiology


Requesting Provider:  JEREMY PADILLA NP


Date/Time of Note


DATE: 3/12/19 


TIME: 15:52





24 HR Interval Summary


Free Text/Dictation


Denies chest pain, shortness of breath, palpitations





Exam/Review of Systems


Vital Signs


Vitals





Vital Signs


  Date      Temp  Pulse  Resp  B/P (MAP)   Pulse Ox  O2          O2 Flow    FiO2


Time                                                 Delivery    Rate


   3/12/19  98.1     78    19      112/58        97


     15:11                           (76)


   3/12/19                                           Room Air


     13:32


   3/12/19                                                                    21


     08:54


    3/9/19                                                             2.0


     08:00








Intake and Output





3/11/19


3/11/19


3/12/19





1515:00


23:00


07:00





IntakeIntake Total


550 ml


480 ml





BalanceBalance


550 ml


480 ml














Exam


Constitutional:  alert (Following commands, no apparent distress)


Head:  normocephalic


Respiratory:  other (Coarse breath sounds bilaterally, no wheezing)


Cardiovascular:  regular rate and rhythm (S1-S2 heard)


Gastrointestinal:  soft, non-tender, bowel sounds


Extremities:  other (No significant edema, amputation)





Labs


Result Diagram:  


3/12/19 0556                                                                    


           3/12/19 0556





Results 24hrs





Laboratory Tests


Test
                 3/11/19
17:13  3/11/19
19:49  3/12/19
05:56  3/12/19
08:02


Bedside Glucose               158            158                           150


White Blood Count                                        11.7  #H


Red Blood Count                                           3.03  L


Hemoglobin                                                 9.8  L


Hematocrit                                                30.2  L


Mean Corpuscular                                           99.7


Volume


Mean Corpuscular                                           32.3


Hemoglobin


Mean Corpuscular      
              
                    32.5  
  



Hemoglobin
Concent


Red Cell                                                  17.2  H


Distribution Width


Platelet Count                                              151


Mean Platelet Volume                                       10.0


Immature                                                 0.900  H


Granulocytes %


Neutrophils %                                             83.5  H


Lymphocytes %                                              8.5  L


Monocytes %                                                 5.6


Eosinophils %                                               1.2


Basophils %                                                 0.3


Nucleated Red Blood                                         0.0


Cells %


Immature                                                 0.110  H


Granulocytes #


Neutrophils #                                              9.8  H


Lymphocytes #                                               1.0


Monocytes #                                                 0.7


Eosinophils #                                               0.1


Basophils #                                                 0.0


Nucleated Red Blood                                         0.0


Cells #


Sodium Level                                                143


Potassium Level                                             4.2


Chloride Level                                              100


Carbon Dioxide Level                                         24


Anion Gap                                                   19  H


Blood Urea Nitrogen                                        49  #H


Creatinine                                                7.09  H


Est Glomerular        
              
                      8  L
  



Filtrat Rate
mL/min


Glucose Level                                               161


Calcium Level                                               9.0


Phosphorus Level                                            4.8


Magnesium Level                                             2.5


Total Bilirubin                                             0.3


Direct Bilirubin                                           0.00


Indirect Bilirubin                                          0.3


Aspartate Amino       
              
                      18  
  



Transf
(AST/SGOT)


Alanine               
              
                      31  
  



Aminotransferase
(AL


T/SGPT)


Alkaline Phosphatase                                       149  H


Total Protein                                               7.6


Albumin                                                     3.9


Globulin                                                  3.70  H


Albumin/Globulin                                           1.05


Ratio


Random Vancomycin                                          18.5


Level


Test
                 3/12/19
11:09  
              
              



Bedside Glucose               140








Medications


Medications





Current Medications


IV Flush (NS 3 ml) 3 ml PER PROTOCOL IV ;  Start 3/7/19 at 16:00


Insulin Aspart (Novolog Insulin Pen) NOVOLOG *MILD* ALGORITHM WITH MEALS  


BEDTIME SC  Last administered on 3/12/19at 08:42; Admin Dose 1 UNIT;  Start 


3/7/19 at 18:00


Ascorbic Acid (Vitamin C) 500 mg DAILY PO  Last administered on 3/12/19at 08:55;


Admin Dose 500 MG;  Start 3/8/19 at 09:00


Docusate Sodium (Colace) 200 mg HS PO  Last administered on 3/10/19at 20:49; 


Admin Dose 200 MG;  Start 3/7/19 at 21:00


Ferrous Sulfate (Ferrous Sulfate (Ec)) 325 mg DAILY PO  Last administered on 


3/12/19at 08:55; Admin Dose 325 MG;  Start 3/8/19 at 09:00


Levothyroxine Sodium (Synthroid) 50 mcg BEFORE  BREAKFAST PO  Last administered 


on 3/12/19at 06:08; Admin Dose 50 MCG;  Start 3/8/19 at 07:00


Multivitamins Therapeutic (Theragran) 1 tab DAILY PO  Last administered on 


3/12/19at 08:55; Admin Dose 1 TAB;  Start 3/8/19 at 09:00


Sevelamer Carbonate (Renvela) 0.8 gm WITH  MEALS PO  Last administered on 


3/12/19at 11:10; Admin Dose 0.8 GM;  Start 3/7/19 at 18:00


Atorvastatin Calcium (Lipitor) 40 mg QHS PO  Last administered on 3/11/19at 


19:46; Admin Dose 40 MG;  Start 3/7/19 at 21:00


Vancomycin HCl (Vanco Iv Per Pharmacy) VANCOMYCIN PER PHARMACY PER PROTOCOL XX ;


 Start 3/8/19 at 07:30


Epoetin Connor (Epogen (Esrd)) 10,000 units MoWeFr@17 SC  Last administered on 


3/11/19at 17:20; Admin Dose 10,000 UNITS;  Start 3/8/19 at 17:00


Pantoprazole (Protonix Iv) 40 mg BID@06,18 IV  Last administered on 3/12/19at 


06:08; Admin Dose 40 MG;  Start 3/8/19 at 12:30


Carvedilol (Coreg) 3.125 mg BID PO  Last administered on 3/12/19at 08:55; Admin 


Dose 3.125 MG;  Start 3/9/19 at 15:00


Ondansetron HCl (Zofran Inj) 4 mg Q4H  PRN IV NAUSEA AND/OR VOMITING Last 


administered on 3/10/19at 14:05; Admin Dose 4 MG;  Start 3/10/19 at 14:00


Acetaminophen (Tylenol Tab) 500 mg Q6H  PRN PO MILD PAIN(1-3)OR ELEVATED TEMP;  


Start 3/10/19 at 17:00


Meropenem/Sodium Chloride 50 ml @  100 mls/hr Q24H IVPB  Last administered on 


3/12/19at 08:57; Admin Dose 100 MLS/HR;  Start 3/11/19 at 09:00


Levalbuterol (Xopenex Neb) 0.63 mg Q8H RESP  THERAPY HHN  Last administered on 


3/12/19at 08:54; Admin Dose 0.63 MG;  Start 3/12/19 at 00:00


Miscellaneous Information 1 ea NOTE XX ;  Start 3/12/19 at 15:30


Glucose (Glutose) 15 gm Q15M  PRN PO DECREASED GLUCOSE;  Start 3/12/19 at 15:30


Glucose (Glutose) 22.5 gm Q15M  PRN PO DECREASED GLUCOSE;  Start 3/12/19 at 1


5:30


Dextrose (D50w Syringe) 25 ml Q15M  PRN IV DECREASED GLUCOSE;  Start 3/12/19 at 


15:30


Dextrose (D50w Syringe) 50 ml Q15M  PRN IV DECREASED GLUCOSE;  Start 3/12/19 at 


15:30


Glucagon (Glucagen) 1 mg Q15M  PRN IM DECREASED GLUCOSE;  Start 3/12/19 at 15:30


Glucose (Glutose) 15 gm Q15M  PRN BUCCAL DECREASED GLUCOSE;  Start 3/12/19 at 


15:30











Steve Wesley DO           Mar 12, 2019 15:53

## 2019-03-12 NOTE — CONS
Assessment/Plan


Assessment/Plan


Hospital Course (Demo Recall)


- fever, diaphoresis and chills associated with dialysis prior to admission and 


again on 3/10/2019, concerning for possible catheter related bloodstream 


infection. Another possibility is fever due to GIB


- ESRD on HD via HD catheter on R chest wall. Pt does not remember how old the 


catheter is


- Pyuria r/o UTI


- GIB, scheduled for EGD on 3/11/2019


- DM - Hgb A1c 6.8%


- CAD


- LLL infiltrate vs. atelectasis on CXR upon admission but Pt does not have resp


Sx


- s/p type 2 MI vs. demand ischemia due to acute on chronic anemia


- acute on chronic anemia


- prostatic hypertrophy


- hypothyroidism


- s/p coronary artery bypass grafting


- h/o previous MI


- h/o diabetic wound of RLE


- status post right BKA





Recommendations: 


- Pending: Blood cultures from 3/11/19 (NGTD), procalcitonin from 3/10 (in 


process), Urine culture from 3/10 (in process), and repeat blood cx from HD 


catheter from today 


- Continue IV vancomycin (3/8/2019-) and renally dosed meropenem (3/11/2019-) 


Management d/w patient, GILMAR Burns, and with Dr. Forde.





Consultation Date/Type/Reason


Admit Date/Time


Mar 7, 2019 at 13:32


Initial Consult Date


3/10/19


Type of Consult


Infectious Disease


Requesting Provider:  JEREMY PADILLA NP


Date/Time of Note


DATE: 3/12/19 


TIME: 13:57





24 HR Interval Summary


Free Text/Dictation


Blood cultures x2 taken from HD line during HD today per d/w nursing.


Pt with no complaints.





Exam/Review of Systems


Exam


Vitals





Vital Signs


  Date      Temp  Pulse  Resp  B/P (MAP)   Pulse Ox  O2          O2 Flow    FiO2


Time                                                 Delivery    Rate


   3/12/19           83    18      115/59        98  Room Air


     13:32                           (77)


   3/12/19  98.2


     11:26


   3/12/19                                                                    21


     08:54


    3/9/19                                                             2.0


     08:00








Intake and Output





3/11/19


3/11/19


3/12/19





1515:00


23:00


07:00





IntakeIntake Total


550 ml


480 ml





BalanceBalance


550 ml


480 ml











Exam


Constitutional:  alert, oriented, well developed


Psych:  no complaints, nl mood/affect


Head:  normocephalic


Eyes:  nl conjunctiva, nl lids, nl sclera


ENMT:  nl external ears & nose, nl nasal mucosa & septum, mucosa pink and moist


Neck:  supple, non-tender


Respiratory:  clear to auscultation, normal air movement


Cardiovascular:  regular rate and rhythm, nl pulses


Gastrointestinal:  soft, non-tender, bowel sounds (normoactive)


Genitourinary - Male:  other (Right chest HD catheter site c/d/i)


Extremities:  other (R BKA with healed stump)


Neurological:  nl mental status, other (currently non-verbal; nods to simple 


questions appropriately; SANZ)


Skin:  nl turgor; 


   No rash or lesions





Results


Result Diagram:  


3/12/19 0556                                                                    


           3/12/19 0556





Results 24hrs





Laboratory Tests


Test
                 3/11/19
17:13  3/11/19
19:49  3/12/19
05:56  3/12/19
08:02


Bedside Glucose               158            158                           150


White Blood Count                                        11.7  #H


Red Blood Count                                           3.03  L


Hemoglobin                                                 9.8  L


Hematocrit                                                30.2  L


Mean Corpuscular                                           99.7


Volume


Mean Corpuscular                                           32.3


Hemoglobin


Mean Corpuscular      
              
                    32.5  
  



Hemoglobin
Concent


Red Cell                                                  17.2  H


Distribution Width


Platelet Count                                              151


Mean Platelet Volume                                       10.0


Immature                                                 0.900  H


Granulocytes %


Neutrophils %                                             83.5  H


Lymphocytes %                                              8.5  L


Monocytes %                                                 5.6


Eosinophils %                                               1.2


Basophils %                                                 0.3


Nucleated Red Blood                                         0.0


Cells %


Immature                                                 0.110  H


Granulocytes #


Neutrophils #                                              9.8  H


Lymphocytes #                                               1.0


Monocytes #                                                 0.7


Eosinophils #                                               0.1


Basophils #                                                 0.0


Nucleated Red Blood                                         0.0


Cells #


Sodium Level                                                143


Potassium Level                                             4.2


Chloride Level                                              100


Carbon Dioxide Level                                         24


Anion Gap                                                   19  H


Blood Urea Nitrogen                                        49  #H


Creatinine                                                7.09  H


Est Glomerular        
              
                      8  L
  



Filtrat Rate
mL/min


Glucose Level                                               161


Calcium Level                                               9.0


Phosphorus Level                                            4.8


Magnesium Level                                             2.5


Total Bilirubin                                             0.3


Direct Bilirubin                                           0.00


Indirect Bilirubin                                          0.3


Aspartate Amino       
              
                      18  
  



Transf
(AST/SGOT)


Alanine               
              
                      31  
  



Aminotransferase
(AL


T/SGPT)


Alkaline Phosphatase                                       149  H


Total Protein                                               7.6


Albumin                                                     3.9


Globulin                                                  3.70  H


Albumin/Globulin                                           1.05


Ratio


Random Vancomycin                                          18.5


Level


Test
                 3/12/19
11:09  
              
              



Bedside Glucose               140








Imaging


Imaging


CXR 3/11/2019:  Mildly increased interstitial edema suggesting cardiopulmonary 


congestion. 


Similar appearance of left basilar infiltrates.





Medications


Medication





Current Medications


IV Flush (NS 3 ml) 3 ml PER PROTOCOL IV ;  Start 3/7/19 at 16:00


Insulin Aspart (Novolog Insulin Pen) NOVOLOG *MILD* ALGORITHM WITH MEALS  


BEDTIME SC  Last administered on 3/12/19at 08:42; Admin Dose 1 UNIT;  Start 


3/7/19 at 18:00


Ascorbic Acid (Vitamin C) 500 mg DAILY PO  Last administered on 3/12/19at 08:55;


Admin Dose 500 MG;  Start 3/8/19 at 09:00


Docusate Sodium (Colace) 200 mg HS PO  Last administered on 3/10/19at 20:49; 


Admin Dose 200 MG;  Start 3/7/19 at 21:00


Ferrous Sulfate (Ferrous Sulfate (Ec)) 325 mg DAILY PO  Last administered on 


3/12/19at 08:55; Admin Dose 325 MG;  Start 3/8/19 at 09:00


Levothyroxine Sodium (Synthroid) 50 mcg BEFORE  BREAKFAST PO  Last administered 


on 3/12/19at 06:08; Admin Dose 50 MCG;  Start 3/8/19 at 07:00


Multivitamins Therapeutic (Theragran) 1 tab DAILY PO  Last administered on 


3/12/19at 08:55; Admin Dose 1 TAB;  Start 3/8/19 at 09:00


Sevelamer Carbonate (Renvela) 0.8 gm WITH  MEALS PO  Last administered on 


3/12/19at 11:10; Admin Dose 0.8 GM;  Start 3/7/19 at 18:00


Atorvastatin Calcium (Lipitor) 40 mg QHS PO  Last administered on 3/11/19at 


19:46; Admin Dose 40 MG;  Start 3/7/19 at 21:00


Vancomycin HCl (Vanco Iv Per Pharmacy) VANCOMYCIN PER PHARMACY PER PROTOCOL XX ;


 Start 3/8/19 at 07:30


Epoetin Connor (Epogen (Esrd)) 10,000 units MoWeFr@17 SC  Last administered on 


3/11/19at 17:20; Admin Dose 10,000 UNITS;  Start 3/8/19 at 17:00


Pantoprazole (Protonix Iv) 40 mg BID@06,18 IV  Last administered on 3/12/19at 


06:08; Admin Dose 40 MG;  Start 3/8/19 at 12:30


Carvedilol (Coreg) 3.125 mg BID PO  Last administered on 3/12/19at 08:55; Admin 


Dose 3.125 MG;  Start 3/9/19 at 15:00


Ondansetron HCl (Zofran Inj) 4 mg Q4H  PRN IV NAUSEA AND/OR VOMITING Last 


administered on 3/10/19at 14:05; Admin Dose 4 MG;  Start 3/10/19 at 14:00


Acetaminophen (Tylenol Tab) 500 mg Q6H  PRN PO MILD PAIN(1-3)OR ELEVATED TEMP;  


Start 3/10/19 at 17:00


Meropenem/Sodium Chloride 50 ml @  100 mls/hr Q24H IVPB  Last administered on 


3/12/19at 08:57; Admin Dose 100 MLS/HR;  Start 3/11/19 at 09:00


Levalbuterol (Xopenex Neb) 0.63 mg Q8H RESP  THERAPY HHN  Last administered on 


3/12/19at 08:54; Admin Dose 0.63 MG;  Start 3/12/19 at 00:00











PAUL WISEMAN NP              Mar 12, 2019 13:59

## 2019-03-12 NOTE — PN
DATE:  03/12/2019

 

 

SUBJECTIVE:  The patient remains stable.  No events overnight.  No fevers, chills, nausea, or vomitin
g.

 

OBJECTIVE:

VITAL SIGNS:  Blood pressure 131/64, respirations 18, pulse 62, temperature 98.1.

HEENT:  Head is normocephalic.

NECK:  Supple.

HEART:  Regular rate.

LUNGS:  Show diminished breath sounds at the base.

ABDOMEN:  Soft, nontender to palpation without rebound or guarding.

EXTREMITIES:  Negative for clubbing, cyanosis, no edema.

DERMATOLOGIC:  No rashes.

MUSCULOSKELETAL:  No joint effusion.

NEUROLOGIC:  No change in exam.

 

MEDICATIONS:  Reviewed.

 

LABORATORY DATA:  Reviewed.

 

ASSESSMENT AND PLAN:

1.  End-stage renal disease.  The patient had hemodialysis scheduled for today.  We will dialyze 3 ho
urs 3k bath, calcium 2.5.

2.  Anemia.  Continue to monitor hemoglobin and hematocrit levels.  Continue Epogen.

3.  Mineral bone disorder, monitor calcium and phosphorus levels.

4.  Questionable GI bleed.  The patient is status post EGD.  Results are pending.

5.  Systemic inflammatory response syndrome, possible sepsis.  The patient's white count has improved
.  Cultures have been negative to date.  Continue to monitor.  Follow up with infectious disease's re
commendations.  Line infection is being ruled out.

6.  Acute respiratory failure, improved.

7.  Elevated troponin.  Continue medical management.

8.  Encephalopathy, improved.

9.  Diabetes.  Continue current insulin regimen.

10.  Coronary artery disease.  Continue current treatment plan.

11.  Hypothyroidism.  Continue Synthroid.

12.  Dyslipidemia.

13.  Hypertension.  Continue current blood pressure regimen.

 

 

Dictated By: ELODIA RAHMAN DO

 

NR/NTS

DD:    03/12/2019 09:00:44

DT:    03/12/2019 09:20:02

Conf#: [id number]

DID#:  [id number]

CC: BUCK ABDI MD; JORGE DAVID MD; STEPHANE SENA MD;*EndCC*

## 2019-03-12 NOTE — PN
Date/Time of Note


Date/Time of Note


DATE: 3/12/19 


TIME: 14:35





Assessment/Plan


VTE Prophylaxis


Risk score (from Ns)>0 risk:  5


SCD applied (from Ns):  Yes


SCD contraindicated:  low risk/ambulating


Pharmacological prophylaxis:  LMWH





Lines/Catheters


IV Catheter Type (from Nrs):  permacath


Urinary Cath still in place:  No





Assessment/Plan


Hospital Course





Assessment and plan


1.  Sepsis, severe due to hcap, stable finish antibiotics


2.  HCAP stable finish antibiotics


3.  Acute hypoxic respiratory failure multifactorial, #2 vs DD vs anemia assoc 


pul edema, stable improved, O2 as needed


4.  Anemia, mod/ severe, possible acute blood loss, sp EGD: Gastritis, follow-up


on path.  No asa ~ 2 weeks


5.  Abn troponin: Secondary MI vs primary NSTEMI. no chest pain/ arrhythmias 


therefore continue medical management.


6.  Chronic CAD/CABG, consider stress cath as indicated. Not a candidate for asa


due to #4.


7.  Failure to thrive, transfer to [facility] when stable needs advanced care 


planning reevaluated


8.  Hypothyroidism


9.  Type 2 diabetes


10.  BPH


11.  ESRD stable continue dialysis Tuesday Thursday Saturday


12.  Pulmonary hypertension?


13.  Dialysis access right chest infection?  Obtain blood cultures consider 


permacath.


13.  Right BKA status





Subjective: 


3/11 events noted


3/12: No events





Objective: vss





PE


No pallor JVD


Reg no murmur gallop


Dimin bilaterally; rt access c/d/i


Bs+ nt nd no RRG


No edema rt bka status


Result Diagram:  


3/12/19 0556                                                                    


           3/12/19 0556





Results 24hrs





Laboratory Tests


Test
                 3/11/19
17:13  3/11/19
19:49  3/12/19
05:56  3/12/19
08:02


Bedside Glucose               158            158                           150


White Blood Count                                        11.7  #H


Red Blood Count                                           3.03  L


Hemoglobin                                                 9.8  L


Hematocrit                                                30.2  L


Mean Corpuscular                                           99.7


Volume


Mean Corpuscular                                           32.3


Hemoglobin


Mean Corpuscular      
              
                    32.5  
  



Hemoglobin
Concent


Red Cell                                                  17.2  H


Distribution Width


Platelet Count                                              151


Mean Platelet Volume                                       10.0


Immature                                                 0.900  H


Granulocytes %


Neutrophils %                                             83.5  H


Lymphocytes %                                              8.5  L


Monocytes %                                                 5.6


Eosinophils %                                               1.2


Basophils %                                                 0.3


Nucleated Red Blood                                         0.0


Cells %


Immature                                                 0.110  H


Granulocytes #


Neutrophils #                                              9.8  H


Lymphocytes #                                               1.0


Monocytes #                                                 0.7


Eosinophils #                                               0.1


Basophils #                                                 0.0


Nucleated Red Blood                                         0.0


Cells #


Sodium Level                                                143


Potassium Level                                             4.2


Chloride Level                                              100


Carbon Dioxide Level                                         24


Anion Gap                                                   19  H


Blood Urea Nitrogen                                        49  #H


Creatinine                                                7.09  H


Est Glomerular        
              
                      8  L
  



Filtrat Rate
mL/min


Glucose Level                                               161


Calcium Level                                               9.0


Phosphorus Level                                            4.8


Magnesium Level                                             2.5


Total Bilirubin                                             0.3


Direct Bilirubin                                           0.00


Indirect Bilirubin                                          0.3


Aspartate Amino       
              
                      18  
  



Transf
(AST/SGOT)


Alanine               
              
                      31  
  



Aminotransferase
(AL


T/SGPT)


Alkaline Phosphatase                                       149  H


Total Protein                                               7.6


Albumin                                                     3.9


Globulin                                                  3.70  H


Albumin/Globulin                                           1.05


Ratio


Random Vancomycin                                          18.5


Level


Test
                 3/12/19
11:09  
              
              



Bedside Glucose               140








Exam/Review of Systems


Exam


Vitals





Vital Signs


  Date      Temp  Pulse  Resp  B/P (MAP)   Pulse Ox  O2          O2 Flow    FiO2


Time                                                 Delivery    Rate


   3/12/19           83    18      115/59        98  Room Air


     13:32                           (77)


   3/12/19  98.2


     11:26


   3/12/19                                                                    21


     08:54


    3/9/19                                                             2.0


     08:00








Intake and Output





3/11/19


3/11/19


3/12/19





1515:00


23:00


07:00





IntakeIntake Total


550 ml


480 ml





BalanceBalance


550 ml


480 ml














Results


Results 24hrs





Laboratory Tests


Test
                 3/11/19
17:13  3/11/19
19:49  3/12/19
05:56  3/12/19
08:02


Bedside Glucose               158            158                           150


White Blood Count                                        11.7  #H


Red Blood Count                                           3.03  L


Hemoglobin                                                 9.8  L


Hematocrit                                                30.2  L


Mean Corpuscular                                           99.7


Volume


Mean Corpuscular                                           32.3


Hemoglobin


Mean Corpuscular      
              
                    32.5  
  



Hemoglobin
Concent


Red Cell                                                  17.2  H


Distribution Width


Platelet Count                                              151


Mean Platelet Volume                                       10.0


Immature                                                 0.900  H


Granulocytes %


Neutrophils %                                             83.5  H


Lymphocytes %                                              8.5  L


Monocytes %                                                 5.6


Eosinophils %                                               1.2


Basophils %                                                 0.3


Nucleated Red Blood                                         0.0


Cells %


Immature                                                 0.110  H


Granulocytes #


Neutrophils #                                              9.8  H


Lymphocytes #                                               1.0


Monocytes #                                                 0.7


Eosinophils #                                               0.1


Basophils #                                                 0.0


Nucleated Red Blood                                         0.0


Cells #


Sodium Level                                                143


Potassium Level                                             4.2


Chloride Level                                              100


Carbon Dioxide Level                                         24


Anion Gap                                                   19  H


Blood Urea Nitrogen                                        49  #H


Creatinine                                                7.09  H


Est Glomerular        
              
                      8  L
  



Filtrat Rate
mL/min


Glucose Level                                               161


Calcium Level                                               9.0


Phosphorus Level                                            4.8


Magnesium Level                                             2.5


Total Bilirubin                                             0.3


Direct Bilirubin                                           0.00


Indirect Bilirubin                                          0.3


Aspartate Amino       
              
                      18  
  



Transf
(AST/SGOT)


Alanine               
              
                      31  
  



Aminotransferase
(AL


T/SGPT)


Alkaline Phosphatase                                       149  H


Total Protein                                               7.6


Albumin                                                     3.9


Globulin                                                  3.70  H


Albumin/Globulin                                           1.05


Ratio


Random Vancomycin                                          18.5


Level


Test
                 3/12/19
11:09  
              
              



Bedside Glucose               140








Medications


Medication





Current Medications


IV Flush (NS 3 ml) 3 ml PER PROTOCOL IV ;  Start 3/7/19 at 16:00


Insulin Aspart (Novolog Insulin Pen) NOVOLOG *MILD* ALGORITHM WITH MEALS  


BEDTIME SC  Last administered on 3/12/19at 08:42; Admin Dose 1 UNIT;  Start 


3/7/19 at 18:00


Ascorbic Acid (Vitamin C) 500 mg DAILY PO  Last administered on 3/12/19at 08:55;


Admin Dose 500 MG;  Start 3/8/19 at 09:00


Docusate Sodium (Colace) 200 mg HS PO  Last administered on 3/10/19at 20:49; 


Admin Dose 200 MG;  Start 3/7/19 at 21:00


Ferrous Sulfate (Ferrous Sulfate (Ec)) 325 mg DAILY PO  Last administered on 


3/12/19at 08:55; Admin Dose 325 MG;  Start 3/8/19 at 09:00


Levothyroxine Sodium (Synthroid) 50 mcg BEFORE  BREAKFAST PO  Last administered 


on 3/12/19at 06:08; Admin Dose 50 MCG;  Start 3/8/19 at 07:00


Multivitamins Therapeutic (Theragran) 1 tab DAILY PO  Last administered on 


3/12/19at 08:55; Admin Dose 1 TAB;  Start 3/8/19 at 09:00


Sevelamer Carbonate (Renvela) 0.8 gm WITH  MEALS PO  Last administered on 


3/12/19at 11:10; Admin Dose 0.8 GM;  Start 3/7/19 at 18:00


Atorvastatin Calcium (Lipitor) 40 mg QHS PO  Last administered on 3/11/19at 


19:46; Admin Dose 40 MG;  Start 3/7/19 at 21:00


Vancomycin HCl (Vanco Iv Per Pharmacy) VANCOMYCIN PER PHARMACY PER PROTOCOL XX ;


 Start 3/8/19 at 07:30


Epoetin Connor (Epogen (Esrd)) 10,000 units MoWeFr@17 SC  Last administered on 


3/11/19at 17:20; Admin Dose 10,000 UNITS;  Start 3/8/19 at 17:00


Pantoprazole (Protonix Iv) 40 mg BID@06,18 IV  Last administered on 3/12/19at 


06:08; Admin Dose 40 MG;  Start 3/8/19 at 12:30


Carvedilol (Coreg) 3.125 mg BID PO  Last administered on 3/12/19at 08:55; Admin 


Dose 3.125 MG;  Start 3/9/19 at 15:00


Ondansetron HCl (Zofran Inj) 4 mg Q4H  PRN IV NAUSEA AND/OR VOMITING Last 


administered on 3/10/19at 14:05; Admin Dose 4 MG;  Start 3/10/19 at 14:00


Acetaminophen (Tylenol Tab) 500 mg Q6H  PRN PO MILD PAIN(1-3)OR ELEVATED TEMP;  


Start 3/10/19 at 17:00


Meropenem/Sodium Chloride 50 ml @  100 mls/hr Q24H IVPB  Last administered on 


3/12/19at 08:57; Admin Dose 100 MLS/HR;  Start 3/11/19 at 09:00


Levalbuterol (Xopenex Neb) 0.63 mg Q8H RESP  THERAPY HHN  Last administered on 


3/12/19at 08:54; Admin Dose 0.63 MG;  Start 3/12/19 at 00:00











JORGE DAVID MD         Mar 12, 2019 14:36

## 2019-03-13 VITALS — SYSTOLIC BLOOD PRESSURE: 123 MMHG | DIASTOLIC BLOOD PRESSURE: 47 MMHG | RESPIRATION RATE: 19 BRPM | HEART RATE: 66 BPM

## 2019-03-13 VITALS — DIASTOLIC BLOOD PRESSURE: 56 MMHG | RESPIRATION RATE: 18 BRPM | SYSTOLIC BLOOD PRESSURE: 118 MMHG | HEART RATE: 65 BPM

## 2019-03-13 VITALS — HEART RATE: 66 BPM | RESPIRATION RATE: 19 BRPM | DIASTOLIC BLOOD PRESSURE: 56 MMHG | SYSTOLIC BLOOD PRESSURE: 125 MMHG

## 2019-03-13 VITALS — HEART RATE: 71 BPM

## 2019-03-13 VITALS — HEART RATE: 64 BPM

## 2019-03-13 VITALS — DIASTOLIC BLOOD PRESSURE: 60 MMHG | SYSTOLIC BLOOD PRESSURE: 133 MMHG | RESPIRATION RATE: 16 BRPM | HEART RATE: 67 BPM

## 2019-03-13 VITALS — HEART RATE: 65 BPM

## 2019-03-13 VITALS — HEART RATE: 67 BPM | DIASTOLIC BLOOD PRESSURE: 58 MMHG | SYSTOLIC BLOOD PRESSURE: 125 MMHG | RESPIRATION RATE: 19 BRPM

## 2019-03-13 VITALS — HEART RATE: 67 BPM

## 2019-03-13 LAB
ADD MAN DIFF?: NO
ANION GAP: 14 (ref 5–13)
BASOPHIL #: 0 10^3/UL (ref 0–0.1)
BASOPHILS %: 0.2 % (ref 0–2)
BLOOD UREA NITROGEN: 38 MG/DL (ref 7–20)
CALCIUM: 9.3 MG/DL (ref 8.4–10.2)
CARBON DIOXIDE: 29 MMOL/L (ref 21–31)
CHLORIDE: 99 MMOL/L (ref 97–110)
CREATININE: 5.81 MG/DL (ref 0.61–1.24)
EOSINOPHILS #: 0.1 10^3/UL (ref 0–0.5)
EOSINOPHILS %: 1 % (ref 0–7)
GLUCOSE: 184 MG/DL (ref 70–220)
HEMATOCRIT: 33.5 % (ref 42–52)
HEMOGLOBIN: 10.7 G/DL (ref 14–18)
IMMATURE GRANS #M: 0.09 10^3/UL (ref 0–0.03)
IMMATURE GRANS % (M): 0.9 % (ref 0–0.43)
LYMPHOCYTES #: 0.9 10^3/UL (ref 0.8–2.9)
LYMPHOCYTES %: 8.9 % (ref 15–51)
MEAN CORPUSCULAR HEMOGLOBIN: 31.7 PG (ref 29–33)
MEAN CORPUSCULAR HGB CONC: 31.9 G/DL (ref 32–37)
MEAN CORPUSCULAR VOLUME: 99.1 FL (ref 82–101)
MEAN PLATELET VOLUME: 10.4 FL (ref 7.4–10.4)
MONOCYTE #: 0.8 10^3/UL (ref 0.3–0.9)
MONOCYTES %: 8 % (ref 0–11)
NEUTROPHIL #: 8.2 10^3/UL (ref 1.6–7.5)
NEUTROPHILS %: 81 % (ref 39–77)
NUCLEATED RED BLOOD CELLS #: 0 10^3/UL (ref 0–0)
NUCLEATED RED BLOOD CELLS%: 0 /100WBC (ref 0–0)
PLATELET COUNT: 150 10^3/UL (ref 140–415)
POTASSIUM: 4.6 MMOL/L (ref 3.5–5.1)
RED BLOOD COUNT: 3.38 10^6/UL (ref 4.7–6.1)
RED CELL DISTRIBUTION WIDTH: 17.4 % (ref 11.5–14.5)
SODIUM: 142 MMOL/L (ref 135–144)
WHITE BLOOD COUNT: 10.1 10^3/UL (ref 4.8–10.8)

## 2019-03-13 RX ADMIN — ATORVASTATIN CALCIUM 1 MG: 20 TABLET, FILM COATED ORAL at 21:26

## 2019-03-13 RX ADMIN — THERA TABS SCH TAB: TAB at 08:05

## 2019-03-13 RX ADMIN — Medication 1 CAP: at 21:26

## 2019-03-13 RX ADMIN — SEVELAMER CARBONATE 1 GM: 800 POWDER, FOR SUSPENSION ORAL at 12:03

## 2019-03-13 RX ADMIN — SEVELAMER CARBONATE SCH GM: 800 POWDER, FOR SUSPENSION ORAL at 08:04

## 2019-03-13 RX ADMIN — MEROPENEM 1 MLS/HR: 500 INJECTION INTRAVENOUS at 08:25

## 2019-03-13 RX ADMIN — PANTOPRAZOLE SODIUM SCH MG: 40 TABLET, DELAYED RELEASE ORAL at 17:17

## 2019-03-13 RX ADMIN — LEVALBUTEROL HYDROCHLORIDE 1 MG: 0.63 SOLUTION RESPIRATORY (INHALATION) at 09:14

## 2019-03-13 RX ADMIN — ATORVASTATIN CALCIUM SCH MG: 20 TABLET, FILM COATED ORAL at 21:26

## 2019-03-13 RX ADMIN — LEVALBUTEROL HYDROCHLORIDE SCH MG: 0.63 SOLUTION RESPIRATORY (INHALATION) at 15:35

## 2019-03-13 RX ADMIN — LEVOTHYROXINE SODIUM SCH MCG: 50 TABLET ORAL at 06:10

## 2019-03-13 RX ADMIN — VANCOMYCIN HYDROCHLORIDE 1 MLS/HR: 1 INJECTION, POWDER, LYOPHILIZED, FOR SOLUTION INTRAVENOUS at 15:52

## 2019-03-13 RX ADMIN — DOCUSATE SODIUM 1 MG: 100 CAPSULE, LIQUID FILLED ORAL at 21:26

## 2019-03-13 RX ADMIN — Medication SCH CAP: at 21:26

## 2019-03-13 RX ADMIN — SEVELAMER CARBONATE SCH GM: 800 POWDER, FOR SUSPENSION ORAL at 17:09

## 2019-03-13 RX ADMIN — THERA TABS 1 TAB: TAB at 08:05

## 2019-03-13 RX ADMIN — OXYCODONE HYDROCHLORIDE AND ACETAMINOPHEN 1 MG: 500 TABLET ORAL at 08:05

## 2019-03-13 RX ADMIN — LEVALBUTEROL HYDROCHLORIDE 1 MG: 0.63 SOLUTION RESPIRATORY (INHALATION) at 15:35

## 2019-03-13 RX ADMIN — DOCUSATE SODIUM SCH MG: 100 CAPSULE, LIQUID FILLED ORAL at 21:26

## 2019-03-13 RX ADMIN — PANTOPRAZOLE SODIUM 1 MG: 40 INJECTION, POWDER, FOR SOLUTION INTRAVENOUS at 06:10

## 2019-03-13 RX ADMIN — SEVELAMER CARBONATE 1 GM: 800 POWDER, FOR SUSPENSION ORAL at 08:04

## 2019-03-13 RX ADMIN — LEVALBUTEROL HYDROCHLORIDE SCH MG: 0.63 SOLUTION RESPIRATORY (INHALATION) at 00:45

## 2019-03-13 RX ADMIN — LEVALBUTEROL HYDROCHLORIDE 1 MG: 0.63 SOLUTION RESPIRATORY (INHALATION) at 00:45

## 2019-03-13 RX ADMIN — FERROUS SULFATE TAB 325 MG (65 MG ELEMENTAL FE) SCH MG: 325 (65 FE) TAB at 08:05

## 2019-03-13 RX ADMIN — INSULIN ASPART 1 UNIT: 100 INJECTION, SOLUTION INTRAVENOUS; SUBCUTANEOUS at 08:11

## 2019-03-13 RX ADMIN — OXYCODONE HYDROCHLORIDE AND ACETAMINOPHEN SCH MG: 500 TABLET ORAL at 08:05

## 2019-03-13 RX ADMIN — FERROUS SULFATE TAB 325 MG (65 MG ELEMENTAL FE) 1 MG: 325 (65 FE) TAB at 08:05

## 2019-03-13 RX ADMIN — PANTOPRAZOLE SODIUM 1 MG: 40 TABLET, DELAYED RELEASE ORAL at 17:17

## 2019-03-13 RX ADMIN — EPOETIN ALFA 1 UNITS: 10000 SOLUTION INTRAVENOUS; SUBCUTANEOUS at 17:10

## 2019-03-13 RX ADMIN — PANTOPRAZOLE SODIUM SCH MG: 40 INJECTION, POWDER, FOR SOLUTION INTRAVENOUS at 06:10

## 2019-03-13 RX ADMIN — SEVELAMER CARBONATE 1 GM: 800 POWDER, FOR SUSPENSION ORAL at 17:09

## 2019-03-13 RX ADMIN — LEVALBUTEROL HYDROCHLORIDE SCH MG: 0.63 SOLUTION RESPIRATORY (INHALATION) at 09:14

## 2019-03-13 RX ADMIN — INSULIN ASPART 1 UNIT: 100 INJECTION, SOLUTION INTRAVENOUS; SUBCUTANEOUS at 12:09

## 2019-03-13 RX ADMIN — INSULIN ASPART 1 UNIT: 100 INJECTION, SOLUTION INTRAVENOUS; SUBCUTANEOUS at 17:16

## 2019-03-13 RX ADMIN — SEVELAMER CARBONATE SCH GM: 800 POWDER, FOR SUSPENSION ORAL at 12:03

## 2019-03-13 RX ADMIN — LEVOTHYROXINE SODIUM 1 MCG: 50 TABLET ORAL at 06:10

## 2019-03-13 RX ADMIN — INSULIN ASPART 1 UNIT: 100 INJECTION, SOLUTION INTRAVENOUS; SUBCUTANEOUS at 21:00

## 2019-03-13 NOTE — CONS
Assessment/Plan


Assessment/Plan


Hospital Course (Demo Recall)


SIRS


Preserved ejection fraction


Myocardial infarction


Acute blood loss anemia


End-stage renal disease on hemodialysis


Diabetes


History of open heart surgery-likely CABG





-Continue statin therapy


-Titrate beta-blocker as heart rate and blood pressure permits


-No antiplatelet or anticoagulant therapy secondary to severe blood loss anemia


-Fluid management via hemodialysis as per nephrology





Consultation Date/Type/Reason


Admit Date/Time


Mar 7, 2019 at 13:32


Initial Consult Date





Type of Consult


Cardiology


Requesting Provider:  JEREMY PADILLA NP


Date/Time of Note


DATE: 3/13/19 


TIME: 12:46





24 HR Interval Summary


Free Text/Dictation


Denies chest pain, palpitations or shortness of breath





Exam/Review of Systems


Vital Signs


Vitals





Vital Signs


  Date      Temp  Pulse  Resp  B/P (MAP)   Pulse Ox  O2          O2 Flow    FiO2


Time                                                 Delivery    Rate


   3/13/19           67


     12:01


   3/13/19  98.7           19      125/56        96


     11:36                           (79)


   3/13/19                                                                    21


     09:15


   3/13/19                                                             2.0


     09:15


   3/12/19                                           Room Air


     13:32








Intake and Output





3/12/19


3/12/19


3/13/19





1515:00


23:00


07:00





IntakeIntake Total


550 ml


550 ml





OutputOutput Total


2400 ml


100 ml





BalanceBalance


-2400 ml


450 ml


550 ml














Exam


Constitutional:  alert (Following commands, no apparent distress)


Head:  normocephalic


Respiratory:  other (Coarse breath sounds bilaterally, no wheezing)


Cardiovascular:  regular rate and rhythm (S1-S2 heard)


Gastrointestinal:  soft, non-tender, bowel sounds


Extremities:  other (Amputation)





Labs


Result Diagram:  


3/13/19 0919                                                                    


           3/13/19 0918





Results 24hrs





Laboratory Tests


Test
                 3/12/19
17:01  3/12/19
20:47  3/13/19
01:57  3/13/19
08:03


Bedside Glucose               154           227  H          193            163


Test
                 3/13/19
09:18  3/13/19
09:19  3/13/19
12:01  



Sodium Level                  142


Potassium Level               4.6


Chloride Level                 99


Carbon Dioxide Level           29


Anion Gap                     14  H


Blood Urea Nitrogen          38  #H


Creatinine                  5.81  H


Est Glomerular               10  L
  
              
              



Filtrat Rate
mL/min


Glucose Level                 184


Calcium Level                 9.3


White Blood Count                           10.1


Red Blood Count                            3.38  L


Hemoglobin                                 10.7  L


Hematocrit                                 33.5  L


Mean Corpuscular                            99.1


Volume


Mean Corpuscular                            31.7


Hemoglobin


Mean Corpuscular      
                   31.9  L
  
              



Hemoglobin
Concent


Red Cell                                   17.4  H


Distribution Width


Platelet Count                               150


Mean Platelet Volume                        10.4


Immature                                  0.900  H


Granulocytes %


Neutrophils %                              81.0  H


Lymphocytes %                               8.9  L


Monocytes %                                  8.0


Eosinophils %                                1.0


Basophils %                                  0.2


Nucleated Red Blood                          0.0


Cells %


Immature                                  0.090  H


Granulocytes #


Neutrophils #                               8.2  H


Lymphocytes #                                0.9


Monocytes #                                  0.8


Eosinophils #                                0.1


Basophils #                                  0.0


Nucleated Red Blood                          0.0


Cells #


Bedside Glucose                                            236  H








Medications


Medications





Current Medications


IV Flush (NS 3 ml) 3 ml PER PROTOCOL IV ;  Start 3/7/19 at 16:00


Insulin Aspart (Novolog Insulin Pen) NOVOLOG *MILD* ALGORITHM WITH MEALS  


BEDTIME SC  Last administered on 3/13/19at 12:09; Admin Dose 3 UNIT;  Start 3/7


/19 at 18:00


Ascorbic Acid (Vitamin C) 500 mg DAILY PO  Last administered on 3/13/19at 08:05;


Admin Dose 500 MG;  Start 3/8/19 at 09:00


Docusate Sodium (Colace) 200 mg HS PO  Last administered on 3/10/19at 20:49; 


Admin Dose 200 MG;  Start 3/7/19 at 21:00


Ferrous Sulfate (Ferrous Sulfate (Ec)) 325 mg DAILY PO  Last administered on 


3/13/19at 08:05; Admin Dose 325 MG;  Start 3/8/19 at 09:00


Levothyroxine Sodium (Synthroid) 50 mcg BEFORE  BREAKFAST PO  Last administered 


on 3/13/19at 06:10; Admin Dose 50 MCG;  Start 3/8/19 at 07:00


Multivitamins Therapeutic (Theragran) 1 tab DAILY PO  Last administered on 


3/13/19at 08:05; Admin Dose 1 TAB;  Start 3/8/19 at 09:00


Sevelamer Carbonate (Renvela) 0.8 gm WITH  MEALS PO  Last administered on 


3/13/19at 12:03; Admin Dose 0.8 GM;  Start 3/7/19 at 18:00


Atorvastatin Calcium (Lipitor) 40 mg QHS PO  Last administered on 3/12/19at 


20:48; Admin Dose 40 MG;  Start 3/7/19 at 21:00


Vancomycin HCl (Vanco Iv Per Pharmacy) VANCOMYCIN PER PHARMACY PER PROTOCOL XX ;


 Start 3/8/19 at 07:30


Epoetin Connor (Epogen (Esrd)) 10,000 units MoWeFr@17 SC  Last administered on 


3/11/19at 17:20; Admin Dose 10,000 UNITS;  Start 3/8/19 at 17:00


Pantoprazole (Protonix Iv) 40 mg BID@06,18 IV  Last administered on 3/13/19at 


06:10; Admin Dose 40 MG;  Start 3/8/19 at 12:30


Carvedilol (Coreg) 3.125 mg BID PO  Last administered on 3/13/19at 08:06; Admin 


Dose 3.125 MG;  Start 3/9/19 at 15:00


Ondansetron HCl (Zofran Inj) 4 mg Q4H  PRN IV NAUSEA AND/OR VOMITING Last 


administered on 3/10/19at 14:05; Admin Dose 4 MG;  Start 3/10/19 at 14:00


Acetaminophen (Tylenol Tab) 500 mg Q6H  PRN PO MILD PAIN(1-3)OR ELEVATED TEMP;  


Start 3/10/19 at 17:00


Meropenem/Sodium Chloride 50 ml @  100 mls/hr Q24H IVPB  Last administered on 


3/13/19at 08:25; Admin Dose 100 MLS/HR;  Start 3/11/19 at 09:00


Levalbuterol (Xopenex Neb) 0.63 mg Q8H RESP  THERAPY HHN  Last administered on 


3/13/19at 09:14; Admin Dose 0.63 MG;  Start 3/12/19 at 00:00


Miscellaneous Information 1 ea NOTE XX ;  Start 3/12/19 at 15:30


Glucose (Glutose) 15 gm Q15M  PRN PO DECREASED GLUCOSE;  Start 3/12/19 at 15:30


Glucose (Glutose) 22.5 gm Q15M  PRN PO DECREASED GLUCOSE;  Start 3/12/19 at 


15:30


Dextrose (D50w Syringe) 25 ml Q15M  PRN IV DECREASED GLUCOSE;  Start 3/12/19 at 


15:30


Dextrose (D50w Syringe) 50 ml Q15M  PRN IV DECREASED GLUCOSE;  Start 3/12/19 at 


15:30


Glucagon (Glucagen) 1 mg Q15M  PRN IM DECREASED GLUCOSE;  Start 3/12/19 at 15:30


Glucose (Glutose) 15 gm Q15M  PRN BUCCAL DECREASED GLUCOSE;  Start 3/12/19 at 


15:30


Vancomycin HCl 250 ml @  125 mls/hr Q96H IVPB ;  Start 3/13/19 at 16:00











Steve Wesley DO           Mar 13, 2019 12:47

## 2019-03-13 NOTE — PN
DATE:  03/12/2019

 

 

SUBJECTIVE:  The patient is quite alert.  No GI complaints at this time.  I saw the patient because o
f severe anemia.  Upper endoscopy showed evidence of some gastritis, esophagitis.  Pathology from the
 gastric mucosa showed no evidence of H. pylori organisms.

 

PHYSICAL EXAMINATION:

GENERAL:  The patient is alert, now not in distress.

VITAL SIGNS:  Pulse is 64, temperature is 98.1, blood pressure is 112/58.

CARDIOVASCULAR:  Normal heart sounds.

RESPIRATORY:  Normal breath sounds.

ABDOMEN:  Unremarkable.

 

LABORATORY WORKUP:  Hemoglobin has gone up to 9.8.  Initially, it was 6.6 about 4 days ago.

 

CLINICAL IMPRESSION:  The patient has severe anemia.  Upper endoscopy showed gastritis, Helicobacter 
pylori negative.  He needs to have a lower gastrointestinal workup but currently because of the high 
troponin levels, putting him through the preparation for colonoscopy does not seem to be appropriate;
 hence, I would recommend that we will do a colonoscopy as an outpatient.

 

 

Dictated By: BUCK ABDI MD

 

NC/NTS

DD:    03/12/2019 17:24:01

DT:    03/12/2019 18:24:51

Conf#: [id number]

DID#:  [id number]

CC: STEPHANE SENA MD; JORGE DAVID MD;*EndCC*

## 2019-03-13 NOTE — CONS
Assessment/Plan


Assessment/Plan


Hospital Course (Demo Recall)


- fever, diaphoresis and chills associated with dialysis prior to admission and 


again on 3/10/2019, concerning for possible catheter related bloodstream 


infection. Another possibility is fever due to GIB


- ESRD on HD via HD catheter on R chest wall. Pt does not remember how old the 


catheter is


- Pyuria r/o UTI


- GIB, scheduled for EGD on 3/11/2019


- DM - Hgb A1c 6.8%


- CAD


- LLL infiltrate vs. atelectasis on CXR upon admission but Pt does not have resp


Sx


- s/p type 2 MI vs. demand ischemia due to acute on chronic anemia


- acute on chronic anemia


- prostatic hypertrophy


- hypothyroidism


- s/p coronary artery bypass grafting


- h/o previous MI


- h/o diabetic wound of RLE


- status post right BKA





Recommendations: 


- Pending: Blood cultures from 3/11/19 (NGTD), procalcitonin from 3/10 (in 


process), Urine culture from 3/10 (in process), and repeat blood cx from HD c


atheter 3/12/19 (NGTD)


- Continue IV vancomycin (3/8/2019-) and renally dosed meropenem (3/11/2019-) 


Management d/w patient and with Dr. Forde


Thank you





Consultation Date/Type/Reason


Admit Date/Time


Mar 7, 2019 at 13:32


Initial Consult Date


3/10/19


Type of Consult


ID


Requesting Provider:  JEREMY PADILLA NP


Date/Time of Note


DATE: 3/13/19 


TIME: 16:15





24 HR Interval Summary


Free Text/Dictation


The patient denied all ROS. States his last HD was yesterday and he didn't feel 


fevers. Next HD tomorrow.


The patient has remained afebrile. Blood cultures sent yesterday with HD.


No acute issues noted by nursing.





Exam/Review of Systems


Exam


Vitals





Vital Signs


  Date      Temp  Pulse  Resp  B/P (MAP)   Pulse Ox  O2          O2 Flow    FiO2


Time                                                 Delivery    Rate


   3/13/19  98.2     66    19      123/47        96


     15:55                           (72)


   3/13/19                                                                    21


     15:36


   3/13/19                                                             2.0


     09:15


   3/12/19                                           Room Air


     13:32








Intake and Output





3/12/19


3/12/19


3/13/19





1515:00


23:00


07:00





IntakeIntake Total


550 ml


550 ml





OutputOutput Total


2400 ml


100 ml





BalanceBalance


-2400 ml


450 ml


550 ml











Allergies


Coded Allergies


  No Known Allergy (Unverified3/7/19)


Exam


Constitutional:  alert, oriented, well developed


Psych:  no complaints, nl mood/affect


Head:  normocephalic


Eyes:  nl conjunctiva, nl lids, nl sclera


ENMT:  nl external ears & nose, nl nasal mucosa & septum, mucosa pink and moist


Neck:  supple, non-tender


Respiratory:  clear to auscultation, normal air movement


Cardiovascular:  regular rate and rhythm, nl pulses


Gastrointestinal:  soft, non-tender, bowel sounds (normoactive )


Genitourinary - Male:  other (Right chest HD catheter - site is c/d/i without 


erythema, drainage, or tenderness around the catheter site. )


Extremities:  other (R healed BKA)


Neurological:  CNS II-XII intact, nl mental status, nl speech, nl strength


Skin:  nl turgor; other (bruising left hip/flank noted)


   No rash or lesions





Results


Result Diagram:  


3/13/19 0919                                                                    


           3/13/19 0918





Results 24hrs





Laboratory Tests


Test
                 3/12/19
17:01  3/12/19
20:47  3/13/19
01:57  3/13/19
08:03


Bedside Glucose               154           227  H          193            163


Test
                 3/13/19
09:18  3/13/19
09:19  3/13/19
12:01  



Sodium Level                  142


Potassium Level               4.6


Chloride Level                 99


Carbon Dioxide Level           29


Anion Gap                     14  H


Blood Urea Nitrogen          38  #H


Creatinine                  5.81  H


Est Glomerular               10  L
  
              
              



Filtrat Rate
mL/min


Glucose Level                 184


Calcium Level                 9.3


White Blood Count                           10.1


Red Blood Count                            3.38  L


Hemoglobin                                 10.7  L


Hematocrit                                 33.5  L


Mean Corpuscular                            99.1


Volume


Mean Corpuscular                            31.7


Hemoglobin


Mean Corpuscular      
                   31.9  L
  
              



Hemoglobin
Concent


Red Cell                                   17.4  H


Distribution Width


Platelet Count                               150


Mean Platelet Volume                        10.4


Immature                                  0.900  H


Granulocytes %


Neutrophils %                              81.0  H


Lymphocytes %                               8.9  L


Monocytes %                                  8.0


Eosinophils %                                1.0


Basophils %                                  0.2


Nucleated Red Blood                          0.0


Cells %


Immature                                  0.090  H


Granulocytes #


Neutrophils #                               8.2  H


Lymphocytes #                                0.9


Monocytes #                                  0.8


Eosinophils #                                0.1


Basophils #                                  0.0


Nucleated Red Blood                          0.0


Cells #


Bedside Glucose                                            236  H








Imaging


Imaging


CXR 3/11/19


IMPRESSION:


Mildly increased interstitial edema suggesting cardiopulmonary congestion. 


Similar appearance of left basilar infiltrates.





Medications


Medication





Current Medications


IV Flush (NS 3 ml) 3 ml PER PROTOCOL IV ;  Start 3/7/19 at 16:00


Insulin Aspart (Novolog Insulin Pen) NOVOLOG *MILD* ALGORITHM WITH MEALS  


BEDTIME SC  Last administered on 3/13/19at 12:09; Admin Dose 3 UNIT;  Start 


3/7/19 at 18:00


Ascorbic Acid (Vitamin C) 500 mg DAILY PO  Last administered on 3/13/19at 08:05;


Admin Dose 500 MG;  Start 3/8/19 at 09:00


Docusate Sodium (Colace) 200 mg HS PO  Last administered on 3/10/19at 20:49; 


Admin Dose 200 MG;  Start 3/7/19 at 21:00


Ferrous Sulfate (Ferrous Sulfate (Ec)) 325 mg DAILY PO  Last administered on 3/1


3/19at 08:05; Admin Dose 325 MG;  Start 3/8/19 at 09:00


Levothyroxine Sodium (Synthroid) 50 mcg BEFORE  BREAKFAST PO  Last administered 


on 3/13/19at 06:10; Admin Dose 50 MCG;  Start 3/8/19 at 07:00


Multivitamins Therapeutic (Theragran) 1 tab DAILY PO  Last administered on 


3/13/19at 08:05; Admin Dose 1 TAB;  Start 3/8/19 at 09:00


Sevelamer Carbonate (Renvela) 0.8 gm WITH  MEALS PO  Last administered on 


3/13/19at 12:03; Admin Dose 0.8 GM;  Start 3/7/19 at 18:00


Vancomycin HCl (Vanco Iv Per Pharmacy) VANCOMYCIN PER PHARMACY PER PROTOCOL XX ;


 Start 3/8/19 at 07:30


Epoetin Connor (Epogen (Esrd)) 10,000 units MoWeFr@17 SC  Last administered on 


3/11/19at 17:20; Admin Dose 10,000 UNITS;  Start 3/8/19 at 17:00


Pantoprazole (Protonix Iv) 40 mg BID@06,18 IV  Last administered on 3/13/19at 


06:10; Admin Dose 40 MG;  Start 3/8/19 at 12:30


Carvedilol (Coreg) 3.125 mg BID PO  Last administered on 3/13/19at 08:06; Admin 


Dose 3.125 MG;  Start 3/9/19 at 15:00


Ondansetron HCl (Zofran Inj) 4 mg Q4H  PRN IV NAUSEA AND/OR VOMITING Last 


administered on 3/10/19at 14:05; Admin Dose 4 MG;  Start 3/10/19 at 14:00


Acetaminophen (Tylenol Tab) 500 mg Q6H  PRN PO MILD PAIN(1-3)OR ELEVATED TEMP;  


Start 3/10/19 at 17:00


Meropenem/Sodium Chloride 50 ml @  100 mls/hr Q24H IVPB  Last administered on 


3/13/19at 08:25; Admin Dose 100 MLS/HR;  Start 3/11/19 at 09:00


Levalbuterol (Xopenex Neb) 0.63 mg Q8H RESP  THERAPY HHN  Last administered on 


3/13/19at 15:35; Admin Dose 0.63 MG;  Start 3/12/19 at 00:00


Miscellaneous Information 1 ea NOTE XX ;  Start 3/12/19 at 15:30


Glucose (Glutose) 15 gm Q15M  PRN PO DECREASED GLUCOSE;  Start 3/12/19 at 15:30


Glucose (Glutose) 22.5 gm Q15M  PRN PO DECREASED GLUCOSE;  Start 3/12/19 at 


15:30


Dextrose (D50w Syringe) 25 ml Q15M  PRN IV DECREASED GLUCOSE;  Start 3/12/19 at 


15:30


Dextrose (D50w Syringe) 50 ml Q15M  PRN IV DECREASED GLUCOSE;  Start 3/12/19 at 


15:30


Glucagon (Glucagen) 1 mg Q15M  PRN IM DECREASED GLUCOSE;  Start 3/12/19 at 15:30


Glucose (Glutose) 15 gm Q15M  PRN BUCCAL DECREASED GLUCOSE;  Start 3/12/19 at 


15:30


Vancomycin HCl 250 ml @  125 mls/hr Q96H IVPB  Last administered on 3/13/19at 


15:52; Admin Dose 125 MLS/HR;  Start 3/13/19 at 16:00


Lactobacillus Acidophilus/ Rhamnosus (Culturelle) 1 cap BID PO ;  Start 3/13/19 


at 21:00


Atorvastatin Calcium (Lipitor) 20 mg QHS PO ;  Start 3/13/19 at 21:00


Miscellaneous Information 1 tab DAILY PO ;  Start 3/14/19 at 09:00;  Status QI ONEIL NP             Mar 13, 2019 16:16

## 2019-03-13 NOTE — PN
DATE:  03/13/2019

 

 

SUBJECTIVE:  The patient is stable.  No events overnight.

 

OBJECTIVE:

VITAL SIGNS:  Blood pressure is 125/58, pulse 67, respirations 19, temperature 98.2.

HEENT:  Head is normocephalic.

NECK:  Supple.

HEART:  Regular rate.

LUNGS:  Show diminished breath sounds at the base.

ABDOMEN:  Soft, nontender to palpation without rebound or guarding.

EXTREMITIES:  Negative for clubbing, cyanosis, no edema.

DERMATOLOGIC:  No rashes.

MUSCULOSKELETAL:  No joint effusion.

NEUROLOGIC:  No change in exam.

 

MEDICATIONS:  Reviewed.

 

LABORATORY DATA:  Shows white count 11.7, hemoglobin 9.8, platelet count is 151.

 

ASSESSMENT AND PLAN:

1.  End-stage renal disease.  The patient had hemodialysis yesterday and tolerated well.  Plan is for
 dialysis tomorrow.

2.  Anemia.  Monitor hemoglobin and hematocrit levels. Continue Epogen.

3.  Mineral bone disorder, monitor calcium and phosphorus levels.

4.  Questionable gastrointestinal bleed.  The patient is status post EGD, which showed gastritis.  Co
ntinue current treatment plan.  Follow up with GI.

5.  Systemic inflammatory response syndrome, possible sepsis.  Underlying source is unclear.  The pat
ient's cultures have been negative to date.  The patient is being ruled out for line infection.  Cont
inue current antibiotic therapy.  Follow up with infectious disease.

6.  Acute respiratory failure, resolved.

7.  Encephalopathy, improved.

8.  Diabetes.  Continue current insulin regimen.

9.  Coronary artery disease.  Continue medical management.

10.  Hypothyroidism.  Continue Synthroid.

11.  Hypertension.  Continue current blood pressure regimen.

12.  Dyslipidemia.

 

 

Dictated By: ELODIA RAHMAN DO

 

NR/NTS

DD:    03/13/2019 08:43:09

DT:    03/13/2019 09:07:51

Conf#: [id number]

DID#:  [id number]

CC: BUCK ABDI MD; JORGE DAVID MD; STEPHANE SENA MD;*EndCC*

## 2019-03-13 NOTE — PN
Date/Time of Note


Date/Time of Note


DATE: 3/13/19 


TIME: 14:40





Assessment/Plan


VTE Prophylaxis


Risk score (from [facility] [place] [facility])>0 risk:  7


SCD applied (from [facility] [place] [facility]):  No


SCD contraindicated:  low risk/ambulating


Pharmacological prophylaxis:  NA/contraindicated


Pharm contraindication:  surgical contra





Lines/Catheters


IV Catheter Type (from [facility]):  Permacath


Urinary Cath still in place:  No





Assessment/Plan


Hospital Course





A/P


1.  Sepsis, severe due to hcap, stable finish antibiotics


2.  HCAP stable finish antibiotics


3.  Acute hypoxic respiratory failure multifactorial- #2 vs DD vs anemia assoc 


pul edema, stable improved, O2 as needed


4.  Anemia, mod/ severe, possible acute blood loss, sp EGD: Gastritis, follow-up


on path. outpt colonoscopy. No asa ~ 2 weeks


5.  Abn troponin: Secondary MI vs primary NSTEMI. no chest pain/ arrhythmias 


therefore continue medical management.


6.  Chr CAD/CABG/ consider stress cath as indicated. Not a candidate for asa due


to #4.


7.  Failure to thrive, transfer to snf when stable; needs advanced care planning


reevaluated


8.  Hypothyroidism


9.  Type 2 diabetes


10.  BPH


11.  ESRD stable continue dialysis Tuesday Thursday Saturday


12.  Pulmonary hypertension?


13.  Dialysis access right chest infection?  Obtain blood cultures consider 


permacath.


13.  Right BKA status





Subjective: 


3/11 events noted


3/12: No events


3/13 no distress.  Tolerating diet.  No active bleed.  No fever.





Objective: vss





PE


No pallor/ JVD


Reg no m/r/g


Dimin bilaterally; rt access c/d/i


Bs+ nt nd no RRG


No edema rt bka status


Result Diagram:  


3/13/19 0919                                                                    


           3/13/19 0918





Results 24hrs





Laboratory Tests


Test
                 3/12/19
17:01  3/12/19
20:47  3/13/19
01:57  3/13/19
08:03


Bedside Glucose               154           227  H          193            163


Test
                 3/13/19
09:18  3/13/19
09:19  3/13/19
12:01  



Sodium Level                  142


Potassium Level               4.6


Chloride Level                 99


Carbon Dioxide Level           29


Anion Gap                     14  H


Blood Urea Nitrogen          38  #H


Creatinine                  5.81  H


Est Glomerular               10  L
  
              
              



Filtrat Rate
mL/min


Glucose Level                 184


Calcium Level                 9.3


White Blood Count                           10.1


Red Blood Count                            3.38  L


Hemoglobin                                 10.7  L


Hematocrit                                 33.5  L


Mean Corpuscular                            99.1


Volume


Mean Corpuscular                            31.7


Hemoglobin


Mean Corpuscular      
                   31.9  L
  
              



Hemoglobin
Concent


Red Cell                                   17.4  H


Distribution Width


Platelet Count                               150


Mean Platelet Volume                        10.4


Immature                                  0.900  H


Granulocytes %


Neutrophils %                              81.0  H


Lymphocytes %                               8.9  L


Monocytes %                                  8.0


Eosinophils %                                1.0


Basophils %                                  0.2


Nucleated Red Blood                          0.0


Cells %


Immature                                  0.090  H


Granulocytes #


Neutrophils #                               8.2  H


Lymphocytes #                                0.9


Monocytes #                                  0.8


Eosinophils #                                0.1


Basophils #                                  0.0


Nucleated Red Blood                          0.0


Cells #


Bedside Glucose                                            236  H








Exam/Review of Systems


Exam


Vitals





Vital Signs


  Date      Temp  Pulse  Resp  B/P (MAP)   Pulse Ox  O2          O2 Flow    FiO2


Time                                                 Delivery    Rate


   3/13/19           67


     12:01


   3/13/19  98.7           19      125/56        96


     11:36                           (79)


   3/13/19                                                                    21


     09:15


   3/13/19                                                             2.0


     09:15


   3/12/19                                           Room Air


     13:32








Intake and Output





3/12/19


3/12/19


3/13/19





1515:00


23:00


07:00





IntakeIntake Total


550 ml


550 ml





OutputOutput Total


2400 ml


100 ml





BalanceBalance


-2400 ml


450 ml


550 ml














Results


Results 24hrs





Laboratory Tests


Test
                 3/12/19
17:01  3/12/19
20:47  3/13/19
01:57  3/13/19
08:03


Bedside Glucose               154           227  H          193            163


Test
                 3/13/19
09:18  3/13/19
09:19  3/13/19
12:01  



Sodium Level                  142


Potassium Level               4.6


Chloride Level                 99


Carbon Dioxide Level           29


Anion Gap                     14  H


Blood Urea Nitrogen          38  #H


Creatinine                  5.81  H


Est Glomerular               10  L
  
              
              



Filtrat Rate
mL/min


Glucose Level                 184


Calcium Level                 9.3


White Blood Count                           10.1


Red Blood Count                            3.38  L


Hemoglobin                                 10.7  L


Hematocrit                                 33.5  L


Mean Corpuscular                            99.1


Volume


Mean Corpuscular                            31.7


Hemoglobin


Mean Corpuscular      
                   31.9  L
  
              



Hemoglobin
Concent


Red Cell                                   17.4  H


Distribution Width


Platelet Count                               150


Mean Platelet Volume                        10.4


Immature                                  0.900  H


Granulocytes %


Neutrophils %                              81.0  H


Lymphocytes %                               8.9  L


Monocytes %                                  8.0


Eosinophils %                                1.0


Basophils %                                  0.2


Nucleated Red Blood                          0.0


Cells %


Immature                                  0.090  H


Granulocytes #


Neutrophils #                               8.2  H


Lymphocytes #                                0.9


Monocytes #                                  0.8


Eosinophils #                                0.1


Basophils #                                  0.0


Nucleated Red Blood                          0.0


Cells #


Bedside Glucose                                            236  H








Medications


Medication





Current Medications


IV Flush (NS 3 ml) 3 ml PER PROTOCOL IV ;  Start 3/7/19 at 16:00


Insulin Aspart (Novolog Insulin Pen) NOVOLOG *MILD* ALGORITHM WITH MEALS  BE


DTIME SC  Last administered on 3/13/19at 12:09; Admin Dose 3 UNIT;  Start 3/7/19


at 18:00


Ascorbic Acid (Vitamin C) 500 mg DAILY PO  Last administered on 3/13/19at 08:05;


Admin Dose 500 MG;  Start 3/8/19 at 09:00


Docusate Sodium (Colace) 200 mg HS PO  Last administered on 3/10/19at 20:49; 


Admin Dose 200 MG;  Start 3/7/19 at 21:00


Ferrous Sulfate (Ferrous Sulfate (Ec)) 325 mg DAILY PO  Last administered on 


3/13/19at 08:05; Admin Dose 325 MG;  Start 3/8/19 at 09:00


Levothyroxine Sodium (Synthroid) 50 mcg BEFORE  BREAKFAST PO  Last administered 


on 3/13/19at 06:10; Admin Dose 50 MCG;  Start 3/8/19 at 07:00


Multivitamins Therapeutic (Theragran) 1 tab DAILY PO  Last administered on 


3/13/19at 08:05; Admin Dose 1 TAB;  Start 3/8/19 at 09:00


Sevelamer Carbonate (Renvela) 0.8 gm WITH  MEALS PO  Last administered on 


3/13/19at 12:03; Admin Dose 0.8 GM;  Start 3/7/19 at 18:00


Atorvastatin Calcium (Lipitor) 40 mg QHS PO  Last administered on 3/12/19at 


20:48; Admin Dose 40 MG;  Start 3/7/19 at 21:00


Vancomycin HCl (Vanco Iv Per Pharmacy) VANCOMYCIN PER PHARMACY PER PROTOCOL XX ;


 Start 3/8/19 at 07:30


Epoetin Connor (Epogen (Esrd)) 10,000 units MoWeFr@17 SC  Last administered on 


3/11/19at 17:20; Admin Dose 10,000 UNITS;  Start 3/8/19 at 17:00


Pantoprazole (Protonix Iv) 40 mg BID@06,18 IV  Last administered on 3/13/19at 


06:10; Admin Dose 40 MG;  Start 3/8/19 at 12:30


Carvedilol (Coreg) 3.125 mg BID PO  Last administered on 3/13/19at 08:06; Admin 


Dose 3.125 MG;  Start 3/9/19 at 15:00


Ondansetron HCl (Zofran Inj) 4 mg Q4H  PRN IV NAUSEA AND/OR VOMITING Last 


administered on 3/10/19at 14:05; Admin Dose 4 MG;  Start 3/10/19 at 14:00


Acetaminophen (Tylenol Tab) 500 mg Q6H  PRN PO MILD PAIN(1-3)OR ELEVATED TEMP;  


Start 3/10/19 at 17:00


Meropenem/Sodium Chloride 50 ml @  100 mls/hr Q24H IVPB  Last administered on 


3/13/19at 08:25; Admin Dose 100 MLS/HR;  Start 3/11/19 at 09:00


Levalbuterol (Xopenex Neb) 0.63 mg Q8H RESP  THERAPY HHN  Last administered on 


3/13/19at 09:14; Admin Dose 0.63 MG;  Start 3/12/19 at 00:00


Miscellaneous Information 1 ea NOTE XX ;  Start 3/12/19 at 15:30


Glucose (Glutose) 15 gm Q15M  PRN PO DECREASED GLUCOSE;  Start 3/12/19 at 15:30


Glucose (Glutose) 22.5 gm Q15M  PRN PO DECREASED GLUCOSE;  Start 3/12/19 at 


15:30


Dextrose (D50w Syringe) 25 ml Q15M  PRN IV DECREASED GLUCOSE;  Start 3/12/19 at 


15:30


Dextrose (D50w Syringe) 50 ml Q15M  PRN IV DECREASED GLUCOSE;  Start 3/12/19 at 


15:30


Glucagon (Glucagen) 1 mg Q15M  PRN IM DECREASED GLUCOSE;  Start 3/12/19 at 15:30


Glucose (Glutose) 15 gm Q15M  PRN BUCCAL DECREASED GLUCOSE;  Start 3/12/19 at 


15:30


Vancomycin HCl 250 ml @  125 mls/hr Q96H IVPB ;  Start 3/13/19 at 16:00











JORGE DAVID MD         Mar 13, 2019 14:41

## 2019-03-14 VITALS — DIASTOLIC BLOOD PRESSURE: 95 MMHG | HEART RATE: 76 BPM | SYSTOLIC BLOOD PRESSURE: 137 MMHG

## 2019-03-14 VITALS — HEART RATE: 77 BPM | SYSTOLIC BLOOD PRESSURE: 142 MMHG | RESPIRATION RATE: 18 BRPM | DIASTOLIC BLOOD PRESSURE: 69 MMHG

## 2019-03-14 VITALS — HEART RATE: 67 BPM | DIASTOLIC BLOOD PRESSURE: 54 MMHG | RESPIRATION RATE: 18 BRPM | SYSTOLIC BLOOD PRESSURE: 116 MMHG

## 2019-03-14 VITALS — HEART RATE: 65 BPM | DIASTOLIC BLOOD PRESSURE: 45 MMHG | SYSTOLIC BLOOD PRESSURE: 148 MMHG

## 2019-03-14 VITALS — HEART RATE: 65 BPM | SYSTOLIC BLOOD PRESSURE: 155 MMHG | DIASTOLIC BLOOD PRESSURE: 49 MMHG

## 2019-03-14 VITALS — DIASTOLIC BLOOD PRESSURE: 56 MMHG | RESPIRATION RATE: 22 BRPM | HEART RATE: 68 BPM | SYSTOLIC BLOOD PRESSURE: 128 MMHG

## 2019-03-14 VITALS — HEART RATE: 68 BPM | DIASTOLIC BLOOD PRESSURE: 62 MMHG | SYSTOLIC BLOOD PRESSURE: 162 MMHG

## 2019-03-14 VITALS — SYSTOLIC BLOOD PRESSURE: 157 MMHG | DIASTOLIC BLOOD PRESSURE: 35 MMHG | HEART RATE: 68 BPM | RESPIRATION RATE: 16 BRPM

## 2019-03-14 VITALS — SYSTOLIC BLOOD PRESSURE: 148 MMHG | HEART RATE: 68 BPM | DIASTOLIC BLOOD PRESSURE: 46 MMHG

## 2019-03-14 VITALS — DIASTOLIC BLOOD PRESSURE: 51 MMHG | HEART RATE: 73 BPM | SYSTOLIC BLOOD PRESSURE: 149 MMHG

## 2019-03-14 VITALS — SYSTOLIC BLOOD PRESSURE: 148 MMHG | DIASTOLIC BLOOD PRESSURE: 47 MMHG | HEART RATE: 64 BPM

## 2019-03-14 VITALS — SYSTOLIC BLOOD PRESSURE: 153 MMHG | HEART RATE: 74 BPM | DIASTOLIC BLOOD PRESSURE: 67 MMHG

## 2019-03-14 VITALS — DIASTOLIC BLOOD PRESSURE: 48 MMHG | HEART RATE: 64 BPM | SYSTOLIC BLOOD PRESSURE: 150 MMHG

## 2019-03-14 VITALS — SYSTOLIC BLOOD PRESSURE: 148 MMHG | HEART RATE: 74 BPM | RESPIRATION RATE: 16 BRPM | DIASTOLIC BLOOD PRESSURE: 62 MMHG

## 2019-03-14 VITALS — HEART RATE: 67 BPM

## 2019-03-14 VITALS — DIASTOLIC BLOOD PRESSURE: 92 MMHG | SYSTOLIC BLOOD PRESSURE: 170 MMHG | HEART RATE: 77 BPM

## 2019-03-14 VITALS — SYSTOLIC BLOOD PRESSURE: 141 MMHG | HEART RATE: 70 BPM | RESPIRATION RATE: 22 BRPM | DIASTOLIC BLOOD PRESSURE: 56 MMHG

## 2019-03-14 VITALS — SYSTOLIC BLOOD PRESSURE: 148 MMHG | HEART RATE: 65 BPM | DIASTOLIC BLOOD PRESSURE: 40 MMHG

## 2019-03-14 VITALS — HEART RATE: 64 BPM | SYSTOLIC BLOOD PRESSURE: 133 MMHG | DIASTOLIC BLOOD PRESSURE: 81 MMHG

## 2019-03-14 VITALS — HEART RATE: 65 BPM | SYSTOLIC BLOOD PRESSURE: 120 MMHG | DIASTOLIC BLOOD PRESSURE: 68 MMHG | RESPIRATION RATE: 18 BRPM

## 2019-03-14 VITALS — HEART RATE: 81 BPM

## 2019-03-14 LAB
ADD MAN DIFF?: NO
ANION GAP: 17 (ref 5–13)
BASOPHIL #: 0 10^3/UL (ref 0–0.1)
BASOPHILS %: 0.3 % (ref 0–2)
BLOOD UREA NITROGEN: 50 MG/DL (ref 7–20)
CALCIUM: 8.7 MG/DL (ref 8.4–10.2)
CARBON DIOXIDE: 23 MMOL/L (ref 21–31)
CHLORIDE: 101 MMOL/L (ref 97–110)
CREATININE: 7.19 MG/DL (ref 0.61–1.24)
EOSINOPHILS #: 0.1 10^3/UL (ref 0–0.5)
EOSINOPHILS %: 1.8 % (ref 0–7)
GLUCOSE: 164 MG/DL (ref 70–220)
HEMATOCRIT: 32.3 % (ref 42–52)
HEMOGLOBIN: 10.5 G/DL (ref 14–18)
IMMATURE GRANS #M: 0.08 10^3/UL (ref 0–0.03)
IMMATURE GRANS % (M): 1.1 % (ref 0–0.43)
LYMPHOCYTES #: 1.3 10^3/UL (ref 0.8–2.9)
LYMPHOCYTES %: 18.1 % (ref 15–51)
MEAN CORPUSCULAR HEMOGLOBIN: 31.5 PG (ref 29–33)
MEAN CORPUSCULAR HGB CONC: 32.5 G/DL (ref 32–37)
MEAN CORPUSCULAR VOLUME: 97 FL (ref 82–101)
MEAN PLATELET VOLUME: 10.3 FL (ref 7.4–10.4)
MONOCYTE #: 0.8 10^3/UL (ref 0.3–0.9)
MONOCYTES %: 11 % (ref 0–11)
NEUTROPHIL #: 4.9 10^3/UL (ref 1.6–7.5)
NEUTROPHILS %: 67.7 % (ref 39–77)
NUCLEATED RED BLOOD CELLS #: 0 10^3/UL (ref 0–0)
NUCLEATED RED BLOOD CELLS%: 0 /100WBC (ref 0–0)
PLATELET COUNT: 176 10^3/UL (ref 140–415)
POTASSIUM: 3.9 MMOL/L (ref 3.5–5.1)
RED BLOOD COUNT: 3.33 10^6/UL (ref 4.7–6.1)
RED CELL DISTRIBUTION WIDTH: 17.2 % (ref 11.5–14.5)
SODIUM: 141 MMOL/L (ref 135–144)
WHITE BLOOD COUNT: 7.2 10^3/UL (ref 4.8–10.8)

## 2019-03-14 RX ADMIN — SEVELAMER CARBONATE SCH GM: 800 POWDER, FOR SUSPENSION ORAL at 17:31

## 2019-03-14 RX ADMIN — INSULIN ASPART 1 UNIT: 100 INJECTION, SOLUTION INTRAVENOUS; SUBCUTANEOUS at 17:37

## 2019-03-14 RX ADMIN — FERROUS SULFATE TAB 325 MG (65 MG ELEMENTAL FE) SCH MG: 325 (65 FE) TAB at 08:11

## 2019-03-14 RX ADMIN — INSULIN ASPART 1 UNIT: 100 INJECTION, SOLUTION INTRAVENOUS; SUBCUTANEOUS at 21:00

## 2019-03-14 RX ADMIN — MEROPENEM 1 MLS/HR: 500 INJECTION INTRAVENOUS at 08:11

## 2019-03-14 RX ADMIN — THERA TABS SCH TAB: TAB at 08:10

## 2019-03-14 RX ADMIN — PANTOPRAZOLE SODIUM 1 MG: 40 TABLET, DELAYED RELEASE ORAL at 18:02

## 2019-03-14 RX ADMIN — FERROUS SULFATE TAB 325 MG (65 MG ELEMENTAL FE) 1 MG: 325 (65 FE) TAB at 08:11

## 2019-03-14 RX ADMIN — Medication 1 CAP: at 21:05

## 2019-03-14 RX ADMIN — SEVELAMER CARBONATE SCH GM: 800 POWDER, FOR SUSPENSION ORAL at 12:03

## 2019-03-14 RX ADMIN — OXYCODONE HYDROCHLORIDE AND ACETAMINOPHEN SCH MG: 500 TABLET ORAL at 08:10

## 2019-03-14 RX ADMIN — HEPARIN SODIUM 1 UNIT: 1000 INJECTION, SOLUTION INTRAVENOUS; SUBCUTANEOUS at 17:29

## 2019-03-14 RX ADMIN — SEVELAMER CARBONATE 1 GM: 800 POWDER, FOR SUSPENSION ORAL at 12:03

## 2019-03-14 RX ADMIN — PANTOPRAZOLE SODIUM SCH MG: 40 TABLET, DELAYED RELEASE ORAL at 06:00

## 2019-03-14 RX ADMIN — THERA TABS 1 TAB: TAB at 08:10

## 2019-03-14 RX ADMIN — LEVALBUTEROL HYDROCHLORIDE 1 MG: 0.63 SOLUTION RESPIRATORY (INHALATION) at 00:25

## 2019-03-14 RX ADMIN — PANTOPRAZOLE SODIUM 1 MG: 40 TABLET, DELAYED RELEASE ORAL at 06:00

## 2019-03-14 RX ADMIN — LEVOTHYROXINE SODIUM SCH MCG: 50 TABLET ORAL at 08:10

## 2019-03-14 RX ADMIN — SEVELAMER CARBONATE SCH GM: 800 POWDER, FOR SUSPENSION ORAL at 08:11

## 2019-03-14 RX ADMIN — LEVALBUTEROL HYDROCHLORIDE SCH MG: 0.63 SOLUTION RESPIRATORY (INHALATION) at 00:25

## 2019-03-14 RX ADMIN — INSULIN ASPART 1 UNIT: 100 INJECTION, SOLUTION INTRAVENOUS; SUBCUTANEOUS at 12:10

## 2019-03-14 RX ADMIN — ATORVASTATIN CALCIUM SCH MG: 20 TABLET, FILM COATED ORAL at 21:05

## 2019-03-14 RX ADMIN — SEVELAMER CARBONATE 1 GM: 800 POWDER, FOR SUSPENSION ORAL at 08:11

## 2019-03-14 RX ADMIN — LEVOTHYROXINE SODIUM 1 MCG: 50 TABLET ORAL at 08:10

## 2019-03-14 RX ADMIN — Medication 1 CAP: at 08:10

## 2019-03-14 RX ADMIN — Medication SCH CAP: at 21:05

## 2019-03-14 RX ADMIN — PANTOPRAZOLE SODIUM SCH MG: 40 TABLET, DELAYED RELEASE ORAL at 18:02

## 2019-03-14 RX ADMIN — OXYCODONE HYDROCHLORIDE AND ACETAMINOPHEN 1 MG: 500 TABLET ORAL at 08:10

## 2019-03-14 RX ADMIN — LEVOTHYROXINE SODIUM 1 MCG: 50 TABLET ORAL at 06:34

## 2019-03-14 RX ADMIN — HEPARIN SODIUM PRN UNIT: 1000 INJECTION, SOLUTION INTRAVENOUS; SUBCUTANEOUS at 17:29

## 2019-03-14 RX ADMIN — LEVALBUTEROL HYDROCHLORIDE 1 MG: 0.63 SOLUTION RESPIRATORY (INHALATION) at 08:48

## 2019-03-14 RX ADMIN — INSULIN ASPART 1 UNIT: 100 INJECTION, SOLUTION INTRAVENOUS; SUBCUTANEOUS at 08:31

## 2019-03-14 RX ADMIN — Medication SCH CAP: at 08:10

## 2019-03-14 RX ADMIN — LEVOTHYROXINE SODIUM SCH MCG: 50 TABLET ORAL at 06:34

## 2019-03-14 RX ADMIN — ATORVASTATIN CALCIUM 1 MG: 20 TABLET, FILM COATED ORAL at 21:05

## 2019-03-14 RX ADMIN — SEVELAMER CARBONATE 1 GM: 800 POWDER, FOR SUSPENSION ORAL at 17:31

## 2019-03-14 RX ADMIN — LEVALBUTEROL HYDROCHLORIDE SCH MG: 0.63 SOLUTION RESPIRATORY (INHALATION) at 08:48

## 2019-03-14 RX ADMIN — DOCUSATE SODIUM SCH MG: 100 CAPSULE, LIQUID FILLED ORAL at 21:05

## 2019-03-14 RX ADMIN — DOCUSATE SODIUM 1 MG: 100 CAPSULE, LIQUID FILLED ORAL at 21:05

## 2019-03-14 NOTE — CONS
Assessment/Plan


Assessment/Plan


Hospital Course (Demo Recall)


SIRS/sepsis


Preserved ejection fraction


Myocardial infarction


Acute blood loss anemia


End-stage renal disease on hemodialysis


Diabetes


History of open heart surgery-likely CABG





-Continue statin therapy


-Titrate beta-blocker as heart rate and blood pressure permits


-No antiplatelet or anticoagulant therapy secondary to severe blood loss anemia


-Fluid management via hemodialysis as per nephrology





Consultation Date/Type/Reason


Admit Date/Time


Mar 7, 2019 at 13:32


Initial Consult Date





Type of Consult


Cardiology


Requesting Provider:  JEREMY PADILLA NP


Date/Time of Note


DATE: 3/14/19 


TIME: 15:52





24 HR Interval Summary


Free Text/Dictation


Denies shortness of breath, chest pain or palpitations





Exam/Review of Systems


Vital Signs


Vitals





Vital Signs


  Date      Temp  Pulse  Resp  B/P (MAP)   Pulse Ox  O2          O2 Flow    FiO2


Time                                                 Delivery    Rate


   3/14/19           67


     12:16


   3/14/19  98.0           22      128/56        96  Room Air


     11:44                           (80)


   3/14/19                                                                    21


     08:51


   3/13/19                                                             2.0


     09:15








Intake and Output





3/13/19


3/13/19


3/14/19





1515:00


23:00


07:00





IntakeIntake Total


50 ml


710 ml





OutputOutput Total


100 ml





BalanceBalance


50 ml


610 ml














Exam


Constitutional:  alert (Following commands, no apparent distress)


Head:  normocephalic


Respiratory:  other (Coarse breath sounds bilaterally, no wheezing)


Cardiovascular:  regular rate and rhythm (S1-S2 heard)


Gastrointestinal:  soft, non-tender, bowel sounds


Extremities:  other (Amputation)





Labs


Result Diagram:  


3/14/19 0536                                                                    


           3/14/19 0536





Results 24hrs





Laboratory Tests


Test
                 3/13/19
17:08  3/13/19
21:26  3/14/19
05:36  3/14/19
07:18


Bedside Glucose               150            192                           162


White Blood Count                                          7.2  #


Red Blood Count                                           3.33  L


Hemoglobin                                                10.5  L


Hematocrit                                                32.3  L


Mean Corpuscular                                           97.0


Volume


Mean Corpuscular                                           31.5


Hemoglobin


Mean Corpuscular      
              
                    32.5  
  



Hemoglobin
Concent


Red Cell                                                  17.2  H


Distribution Width


Platelet Count                                              176


Mean Platelet Volume                                       10.3


Immature                                                 1.100  H


Granulocytes %


Neutrophils %                                              67.7


Lymphocytes %                                              18.1


Monocytes %                                                11.0


Eosinophils %                                               1.8


Basophils %                                                 0.3


Nucleated Red Blood                                         0.0


Cells %


Immature                                                 0.080  H


Granulocytes #


Neutrophils #                                               4.9


Lymphocytes #                                               1.3


Monocytes #                                                 0.8


Eosinophils #                                               0.1


Basophils #                                                 0.0


Nucleated Red Blood                                         0.0


Cells #


Sodium Level                                                141


Potassium Level                                             3.9


Chloride Level                                              101


Carbon Dioxide Level                                         23


Anion Gap                                                   17  H


Blood Urea Nitrogen                                         50  H


Creatinine                                                7.19  H


Est Glomerular        
              
                      8  L
  



Filtrat Rate
mL/min


Glucose Level                                               164


Calcium Level                                               8.7


Test
                 3/14/19
12:01  
              
              



Bedside Glucose              271  H








Medications


Medications





Current Medications


IV Flush (NS 3 ml) 3 ml PER PROTOCOL IV ;  Start 3/7/19 at 16:00


Insulin Aspart (Novolog Insulin Pen) NOVOLOG *MILD* ALGORITHM WITH MEALS  


BEDTIME SC  Last administered on 3/14/19at 12:10; Admin Dose 4 UNIT;  Start 


3/7/19 at 18:00


Ascorbic Acid (Vitamin C) 500 mg DAILY PO  Last administered on 3/14/19at 08:10;


Admin Dose 500 MG;  Start 3/8/19 at 09:00


Docusate Sodium (Colace) 200 mg HS PO  Last administered on 3/13/19at 21:26; 


Admin Dose 200 MG;  Start 3/7/19 at 21:00


Ferrous Sulfate (Ferrous Sulfate (Ec)) 325 mg DAILY PO  Last administered on 


3/14/19at 08:11; Admin Dose 325 MG;  Start 3/8/19 at 09:00


Levothyroxine Sodium (Synthroid) 50 mcg BEFORE  BREAKFAST PO  Last administered 


on 3/14/19at 08:10; Admin Dose 50 MCG;  Start 3/8/19 at 07:00


Multivitamins Therapeutic (Theragran) 1 tab DAILY PO  Last administered on 


3/14/19at 08:10; Admin Dose 1 TAB;  Start 3/8/19 at 09:00


Sevelamer Carbonate (Renvela) 0.8 gm WITH  MEALS PO  Last administered on 


3/14/19at 12:03; Admin Dose 0.8 GM;  Start 3/7/19 at 18:00


Vancomycin HCl (Vanco Iv Per Pharmacy) VANCOMYCIN PER PHARMACY PER PROTOCOL XX ;


 Start 3/8/19 at 07:30


Epoetin Connor (Epogen (Esrd)) 10,000 units MoWeFr@17 SC  Last administered on 


3/13/19at 17:10; Admin Dose 10,000 UNITS;  Start 3/8/19 at 17:00


Carvedilol (Coreg) 3.125 mg BID PO  Last administered on 3/13/19at 21:27; Admin 


Dose 3.125 MG;  Start 3/9/19 at 15:00


Ondansetron HCl (Zofran Inj) 4 mg Q4H  PRN IV NAUSEA AND/OR VOMITING Last 


administered on 3/10/19at 14:05; Admin Dose 4 MG;  Start 3/10/19 at 14:00


Acetaminophen (Tylenol Tab) 500 mg Q6H  PRN PO MILD PAIN(1-3)OR ELEVATED TEMP;  


Start 3/10/19 at 17:00


Meropenem/Sodium Chloride 50 ml @  100 mls/hr Q24H IVPB  Last administered on 


3/14/19at 08:11; Admin Dose 100 MLS/HR;  Start 3/11/19 at 09:00


Levalbuterol (Xopenex Neb) 0.63 mg Q8H RESP  THERAPY HHN  Last administered on 


3/14/19at 08:48; Admin Dose 0.63 MG;  Start 3/12/19 at 00:00


Miscellaneous Information 1 ea NOTE XX ;  Start 3/12/19 at 15:30


Glucose (Glutose) 15 gm Q15M  PRN PO DECREASED GLUCOSE;  Start 3/12/19 at 15:30


Glucose (Glutose) 22.5 gm Q15M  PRN PO DECREASED GLUCOSE;  Start 3/12/19 at 


15:30


Dextrose (D50w Syringe) 25 ml Q15M  PRN IV DECREASED GLUCOSE;  Start 3/12/19 at 


15:30


Dextrose (D50w Syringe) 50 ml Q15M  PRN IV DECREASED GLUCOSE;  Start 3/12/19 at 


15:30


Glucagon (Glucagen) 1 mg Q15M  PRN IM DECREASED GLUCOSE;  Start 3/12/19 at 15:30


Glucose (Glutose) 15 gm Q15M  PRN BUCCAL DECREASED GLUCOSE;  Start 3/12/19 at 


15:30


Vancomycin HCl 250 ml @  125 mls/hr Q96H IVPB  Last administered on 3/13/19at 


15:52; Admin Dose 125 MLS/HR;  Start 3/13/19 at 16:00


Lactobacillus Acidophilus/ Rhamnosus (Culturelle) 1 cap BID PO  Last admini


stered on 3/14/19at 08:10; Admin Dose 1 CAP;  Start 3/13/19 at 21:00


Atorvastatin Calcium (Lipitor) 20 mg QHS PO  Last administered on 3/13/19at 


21:26; Admin Dose 20 MG;  Start 3/13/19 at 21:00


Miscellaneous Information 1 tab DAILY PO ;  Start 3/14/19 at 09:00;  Status UNV


Pantoprazole (Protonix Tab) 40 mg BID@0600,1800 PO ;  Start 3/13/19 at 18:00











Steve Wesley DO           Mar 14, 2019 15:54

## 2019-03-14 NOTE — PN
DATE:  03/14/2019

 

 

SUBJECTIVE:  The patient is stable, no events overnight.

 

OBJECTIVE:

VITAL SIGNS:  Blood pressure is 120/68, respirations 18, pulse 65, temperature 98.5.

HEENT:  Head is normocephalic.

NECK:  Supple.

HEART:  Regular rate.

LUNGS:  Show diminished breath sounds at the base.

ABDOMEN:  Soft, nontender to palpation without rebound or guarding.

EXTREMITIES:  Negative for clubbing, cyanosis, no edema.

DERMATOLOGIC:  No rashes.

MUSCULOSKELETAL:  No joint effusion.

NEUROLOGIC:  No change in exam.

 

MEDICATIONS:  The patient's medications have been reviewed.

 

LABORATORY DATA:  Shows white count 7.2, hemoglobin 10.5, platelet count 176, BUN 50, creatinine 7.19
.

 

ASSESSMENT AND PLAN:

1.  End-stage renal disease.  Plan is for hemodialysis today.  We will dialyze for 3 hours 3k bath, c
alcium 2.5.

2.  Anemia.  Monitor hemoglobin and hematocrit levels.  Continue Epogen.

3.  Mineral bone disorder, monitor calcium and phosphorus levels.

4.  Gastritis.  The patient is status post EGD.  Continue proton pump inhibitor.

5.  Serous, sepsis secondary to healthcare-associated pneumonia.  Continue current antibiotic regimen
.  Repeat cultures have been negative to date.  We will monitor closely.  Follow up with Infectious D
isease.

6.  Acute respiratory failure, resolved.

7.  Encephalopathy, improved.

8.  Diabetes.  Continue current insulin regimen.

9.  Coronary artery disease.  Continue medical management.

10.  Hypothyroidism.  Continue Synthroid.

11.  Hypertension.  Continue current blood pressure regimen.

12.  Dyslipidemia.

 

 

Dictated By: ELODIA RAHMAN DO

 

NR/NTS

DD:    03/14/2019 08:09:26

DT:    03/14/2019 08:32:05

Conf#: [id number]

DID#:  [id number]

CC: JORGE DAVID MD; BUCK ABDI MD; STEPHANE SENA MD;*EndCC*

## 2019-03-14 NOTE — PN
Date/Time of Note


Date/Time of Note


DATE: 3/14/19 


TIME: 11:53





Assessment/Plan


VTE Prophylaxis


Risk score (from Ns)>0 risk:  10


SCD applied (from Ns):  No


SCD contraindicated:  low risk/ambulating


Pharmacological prophylaxis:  LMWH





Lines/Catheters


IV Catheter Type (from [facility]):  Permacath


Urinary Cath still in place:  No





Assessment/Plan


Hospital Course





A/P


1.  Sepsis, severe due to hcap, stable finish antibiotics


2.  HCAP stable finish antibiotics


3.  Acute hypoxic respiratory failure multifactorial- #2 vs DD vs anemia assoc 


pul edema, stable improved, O2 as needed


4.  Anemia, mod/ severe, possible acute blood loss, sp EGD: Gastritis, follow-up


on path. outpt colonoscopy. No asa ~ 2 weeks


5.  Abn troponin: Secondary MI vs primary NSTEMI. no chest pain/ arrhythmias 


therefore continue medical management.


6.  Chr CAD/CABG/ consider stress cath as indicated. Not a candidate for asa due


to #4.


7.  Failure to thrive, transfer to snf when stable; needs advanced care planning


reevaluated


8.  Hypothyroidism


9.  Type 2 diabetes


10.  BPH


11.  ESRD stable continue dialysis Tuesday Thursday Saturday


12.  Pulmonary hypertension?


13.  Dialysis access right chest infection?  Obtained blood cultures consider 


permacath.


14.  Right BKA status


15.  Nonadherence patient has some frustrating behavior.





S: 


3/11: events noted


3/12: No events


3/13 no distress.  Tolerating diet.  No active bleed.  No fever.  


3/14: Refused insulin sliding coverage, through cholecystitis for early patient 


for dialysis today.  No fever dyspnea or diarrhea.





O: vss





PE


No pallor/ JVD


Reg no m/r/g


Dimin bilaterally; rt access c/d/i


Bs+ nt nd no RRG


No edema rt bka status


Result Diagram:  


3/14/19 0536                                                                    


           3/14/19 0536





Results 24hrs





Laboratory Tests


Test
                 3/13/19
12:01  3/13/19
17:08  3/13/19
21:26  3/14/19
05:36


Bedside Glucose              236  H          150            192


White Blood Count                                                         7.2  #


Red Blood Count                                                          3.33  L


Hemoglobin                                                               10.5  L


Hematocrit                                                               32.3  L


Mean Corpuscular                                                          97.0


Volume


Mean Corpuscular                                                          31.5


Hemoglobin


Mean Corpuscular      
              
              
                    32.5  



Hemoglobin
Concent


Red Cell                                                                 17.2  H


Distribution Width


Platelet Count                                                             176


Mean Platelet Volume                                                      10.3


Immature                                                                1.100  H


Granulocytes %


Neutrophils %                                                             67.7


Lymphocytes %                                                             18.1


Monocytes %                                                               11.0


Eosinophils %                                                              1.8


Basophils %                                                                0.3


Nucleated Red Blood                                                        0.0


Cells %


Immature                                                                0.080  H


Granulocytes #


Neutrophils #                                                              4.9


Lymphocytes #                                                              1.3


Monocytes #                                                                0.8


Eosinophils #                                                              0.1


Basophils #                                                                0.0


Nucleated Red Blood                                                        0.0


Cells #


Sodium Level                                                               141


Potassium Level                                                            3.9


Chloride Level                                                             101


Carbon Dioxide Level                                                        23


Anion Gap                                                                  17  H


Blood Urea Nitrogen                                                        50  H


Creatinine                                                               7.19  H


Est Glomerular        
              
              
                      8  L



Filtrat Rate
mL/min


Glucose Level                                                              164


Calcium Level                                                              8.7


Test
                 3/14/19
07:18  
              
              



Bedside Glucose               162








Exam/Review of Systems


Exam


Vitals





Vital Signs


  Date      Temp  Pulse  Resp  B/P (MAP)   Pulse Ox  O2          O2 Flow    FiO2


Time                                                 Delivery    Rate


   3/14/19  98.0     68    22      128/56        96  Room Air


     11:44                           (80)


   3/14/19                                                                    21


     08:51


   3/13/19                                                             2.0


     09:15








Intake and Output





3/13/19


3/13/19


3/14/19





1515:00


23:00


07:00





IntakeIntake Total


50 ml


710 ml





OutputOutput Total


100 ml





BalanceBalance


50 ml


610 ml














Results


Results 24hrs





Laboratory Tests


Test
                 3/13/19
12:01  3/13/19
17:08  3/13/19
21:26  3/14/19
05:36


Bedside Glucose              236  H          150            192


White Blood Count                                                         7.2  #


Red Blood Count                                                          3.33  L


Hemoglobin                                                               10.5  L


Hematocrit                                                               32.3  L


Mean Corpuscular                                                          97.0


Volume


Mean Corpuscular                                                          31.5


Hemoglobin


Mean Corpuscular      
              
              
                    32.5  



Hemoglobin
Concent


Red Cell                                                                 17.2  H


Distribution Width


Platelet Count                                                             176


Mean Platelet Volume                                                      10.3


Immature                                                                1.100  H


Granulocytes %


Neutrophils %                                                             67.7


Lymphocytes %                                                             18.1


Monocytes %                                                               11.0


Eosinophils %                                                              1.8


Basophils %                                                                0.3


Nucleated Red Blood                                                        0.0


Cells %


Immature                                                                0.080  H


Granulocytes #


Neutrophils #                                                              4.9


Lymphocytes #                                                              1.3


Monocytes #                                                                0.8


Eosinophils #                                                              0.1


Basophils #                                                                0.0


Nucleated Red Blood                                                        0.0


Cells #


Sodium Level                                                               141


Potassium Level                                                            3.9


Chloride Level                                                             101


Carbon Dioxide Level                                                        23


Anion Gap                                                                  17  H


Blood Urea Nitrogen                                                        50  H


Creatinine                                                               7.19  H


Est Glomerular        
              
              
                      8  L



Filtrat Rate
mL/min


Glucose Level                                                              164


Calcium Level                                                              8.7


Test
                 3/14/19
07:18  
              
              



Bedside Glucose               162








Medications


Medication





Current Medications


IV Flush (NS 3 ml) 3 ml PER PROTOCOL IV ;  Start 3/7/19 at 16:00


Insulin Aspart (Novolog Insulin Pen) NOVOLOG *MILD* ALGORITHM WITH MEALS  


BEDTIME SC  Last administered on 3/14/19at 08:31; Admin Dose 1 UNIT;  Start 


3/7/19 at 18:00


Ascorbic Acid (Vitamin C) 500 mg DAILY PO  Last administered on 3/14/19at 08:10;


Admin Dose 500 MG;  Start 3/8/19 at 09:00


Docusate Sodium (Colace) 200 mg HS PO  Last administered on 3/13/19at 21:26; 


Admin Dose 200 MG;  Start 3/7/19 at 21:00


Ferrous Sulfate (Ferrous Sulfate (Ec)) 325 mg DAILY PO  Last administered on 


3/14/19at 08:11; Admin Dose 325 MG;  Start 3/8/19 at 09:00


Levothyroxine Sodium (Synthroid) 50 mcg BEFORE  BREAKFAST PO  Last administered 


on 3/14/19at 08:10; Admin Dose 50 MCG;  Start 3/8/19 at 07:00


Multivitamins Therapeutic (Theragran) 1 tab DAILY PO  Last administered on 


3/14/19at 08:10; Admin Dose 1 TAB;  Start 3/8/19 at 09:00


Sevelamer Carbonate (Renvela) 0.8 gm WITH  MEALS PO  Last administered on 


3/14/19at 08:11; Admin Dose 0.8 GM;  Start 3/7/19 at 18:00


Vancomycin HCl (Vanco Iv Per Pharmacy) VANCOMYCIN PER PHARMACY PER PROTOCOL XX ;


 Start 3/8/19 at 07:30


Epoetin Connor (Epogen (Esrd)) 10,000 units MoWeFr@17 SC  Last administered on 


3/13/19at 17:10; Admin Dose 10,000 UNITS;  Start 3/8/19 at 17:00


Carvedilol (Coreg) 3.125 mg BID PO  Last administered on 3/13/19at 21:27; Admin 


Dose 3.125 MG;  Start 3/9/19 at 15:00


Ondansetron HCl (Zofran Inj) 4 mg Q4H  PRN IV NAUSEA AND/OR VOMITING Last 


administered on 3/10/19at 14:05; Admin Dose 4 MG;  Start 3/10/19 at 14:00


Acetaminophen (Tylenol Tab) 500 mg Q6H  PRN PO MILD PAIN(1-3)OR ELEVATED TEMP;  


Start 3/10/19 at 17:00


Meropenem/Sodium Chloride 50 ml @  100 mls/hr Q24H IVPB  Last administered on 


3/14/19at 08:11; Admin Dose 100 MLS/HR;  Start 3/11/19 at 09:00


Levalbuterol (Xopenex Neb) 0.63 mg Q8H RESP  THERAPY HHN  Last administered on 


3/14/19at 08:48; Admin Dose 0.63 MG;  Start 3/12/19 at 00:00


Miscellaneous Information 1 ea NOTE XX ;  Start 3/12/19 at 15:30


Glucose (Glutose) 15 gm Q15M  PRN PO DECREASED GLUCOSE;  Start 3/12/19 at 15:30


Glucose (Glutose) 22.5 gm Q15M  PRN PO DECREASED GLUCOSE;  Start 3/12/19 at 


15:30


Dextrose (D50w Syringe) 25 ml Q15M  PRN IV DECREASED GLUCOSE;  Start 3/12/19 at 


15:30


Dextrose (D50w Syringe) 50 ml Q15M  PRN IV DECREASED GLUCOSE;  Start 3/12/19 at 


15:30


Glucagon (Glucagen) 1 mg Q15M  PRN IM DECREASED GLUCOSE;  Start 3/12/19 at 15:30


Glucose (Glutose) 15 gm Q15M  PRN BUCCAL DECREASED GLUCOSE;  Start 3/12/19 at 


15:30


Vancomycin HCl 250 ml @  125 mls/hr Q96H IVPB  Last administered on 3/13/19at 


15:52; Admin Dose 125 MLS/HR;  Start 3/13/19 at 16:00


Lactobacillus Acidophilus/ Rhamnosus (Culturelle) 1 cap BID PO  Last 


administered on 3/14/19at 08:10; Admin Dose 1 CAP;  Start 3/13/19 at 21:00


Atorvastatin Calcium (Lipitor) 20 mg QHS PO  Last administered on 3/13/19at 


21:26; Admin Dose 20 MG;  Start 3/13/19 at 21:00


Miscellaneous Information 1 tab DAILY PO ;  Start 3/14/19 at 09:00;  Status UNV


Pantoprazole (Protonix Tab) 40 mg BID@0600,1800 PO ;  Start 3/13/19 at 18:00











JORGE DAVID MD         Mar 14, 2019 11:58

## 2019-03-15 VITALS — HEART RATE: 68 BPM | SYSTOLIC BLOOD PRESSURE: 118 MMHG | DIASTOLIC BLOOD PRESSURE: 46 MMHG | RESPIRATION RATE: 19 BRPM

## 2019-03-15 VITALS — RESPIRATION RATE: 18 BRPM | HEART RATE: 71 BPM | DIASTOLIC BLOOD PRESSURE: 48 MMHG | SYSTOLIC BLOOD PRESSURE: 95 MMHG

## 2019-03-15 VITALS — HEART RATE: 82 BPM

## 2019-03-15 VITALS — HEART RATE: 72 BPM | SYSTOLIC BLOOD PRESSURE: 122 MMHG | RESPIRATION RATE: 19 BRPM | DIASTOLIC BLOOD PRESSURE: 58 MMHG

## 2019-03-15 VITALS — HEART RATE: 69 BPM

## 2019-03-15 VITALS — HEART RATE: 71 BPM

## 2019-03-15 VITALS — HEART RATE: 70 BPM

## 2019-03-15 VITALS — SYSTOLIC BLOOD PRESSURE: 102 MMHG | HEART RATE: 64 BPM | DIASTOLIC BLOOD PRESSURE: 42 MMHG | RESPIRATION RATE: 19 BRPM

## 2019-03-15 VITALS — DIASTOLIC BLOOD PRESSURE: 45 MMHG | SYSTOLIC BLOOD PRESSURE: 100 MMHG | RESPIRATION RATE: 19 BRPM | HEART RATE: 70 BPM

## 2019-03-15 VITALS — HEART RATE: 72 BPM

## 2019-03-15 VITALS — HEART RATE: 70 BPM | RESPIRATION RATE: 18 BRPM | DIASTOLIC BLOOD PRESSURE: 54 MMHG | SYSTOLIC BLOOD PRESSURE: 96 MMHG

## 2019-03-15 LAB
ADD MAN DIFF?: NO
ALANINE AMINOTRANSFERASE: 24 IU/L (ref 13–69)
ALBUMIN/GLOBULIN RATIO: 1.13
ALBUMIN: 4.3 G/DL (ref 3.3–4.9)
ALKALINE PHOSPHATASE: 148 IU/L (ref 42–121)
ANION GAP: 15 (ref 5–13)
ASPARTATE AMINO TRANSFERASE: 25 IU/L (ref 15–46)
BASOPHIL #: 0 10^3/UL (ref 0–0.1)
BASOPHILS %: 0.3 % (ref 0–2)
BILIRUBIN,DIRECT: 0 MG/DL (ref 0–0.2)
BILIRUBIN,TOTAL: 0.4 MG/DL (ref 0.2–1.3)
BLOOD UREA NITROGEN: 38 MG/DL (ref 7–20)
CALCIUM: 9.3 MG/DL (ref 8.4–10.2)
CARBON DIOXIDE: 28 MMOL/L (ref 21–31)
CHLORIDE: 95 MMOL/L (ref 97–110)
CREATININE: 5.71 MG/DL (ref 0.61–1.24)
EOSINOPHILS #: 0.1 10^3/UL (ref 0–0.5)
EOSINOPHILS %: 0.9 % (ref 0–7)
GLOBULIN: 3.8 G/DL (ref 1.3–3.2)
GLUCOSE: 213 MG/DL (ref 70–220)
HEMATOCRIT: 35.4 % (ref 42–52)
HEMOGLOBIN: 11.6 G/DL (ref 14–18)
IMMATURE GRANS #M: 0.08 10^3/UL (ref 0–0.03)
IMMATURE GRANS % (M): 0.9 % (ref 0–0.43)
LYMPHOCYTES #: 1.4 10^3/UL (ref 0.8–2.9)
LYMPHOCYTES %: 16 % (ref 15–51)
MAGNESIUM: 2.3 MG/DL (ref 1.7–2.5)
MEAN CORPUSCULAR HEMOGLOBIN: 32.5 PG (ref 29–33)
MEAN CORPUSCULAR HGB CONC: 32.8 G/DL (ref 32–37)
MEAN CORPUSCULAR VOLUME: 99.2 FL (ref 82–101)
MEAN PLATELET VOLUME: 10.5 FL (ref 7.4–10.4)
MONOCYTE #: 1.1 10^3/UL (ref 0.3–0.9)
MONOCYTES %: 12.4 % (ref 0–11)
NEUTROPHIL #: 6 10^3/UL (ref 1.6–7.5)
NEUTROPHILS %: 69.5 % (ref 39–77)
NUCLEATED RED BLOOD CELLS #: 0 10^3/UL (ref 0–0)
NUCLEATED RED BLOOD CELLS%: 0 /100WBC (ref 0–0)
PHOSPHORUS: 4.4 MG/DL (ref 2.5–4.9)
PLATELET COUNT: 169 10^3/UL (ref 140–415)
POTASSIUM: 4.3 MMOL/L (ref 3.5–5.1)
RED BLOOD COUNT: 3.57 10^6/UL (ref 4.7–6.1)
RED CELL DISTRIBUTION WIDTH: 17.5 % (ref 11.5–14.5)
SODIUM: 138 MMOL/L (ref 135–144)
TOTAL PROTEIN: 8.1 G/DL (ref 6.1–8.1)
WHITE BLOOD COUNT: 8.7 10^3/UL (ref 4.8–10.8)

## 2019-03-15 RX ADMIN — SEVELAMER CARBONATE SCH GM: 800 POWDER, FOR SUSPENSION ORAL at 12:13

## 2019-03-15 RX ADMIN — DOCUSATE SODIUM 1 MG: 100 CAPSULE, LIQUID FILLED ORAL at 20:52

## 2019-03-15 RX ADMIN — LEVALBUTEROL HYDROCHLORIDE SCH MG: 0.63 SOLUTION RESPIRATORY (INHALATION) at 10:33

## 2019-03-15 RX ADMIN — INSULIN ASPART 1 UNIT: 100 INJECTION, SOLUTION INTRAVENOUS; SUBCUTANEOUS at 12:17

## 2019-03-15 RX ADMIN — FERROUS SULFATE TAB 325 MG (65 MG ELEMENTAL FE) 1 MG: 325 (65 FE) TAB at 08:05

## 2019-03-15 RX ADMIN — OXYCODONE HYDROCHLORIDE AND ACETAMINOPHEN SCH MG: 500 TABLET ORAL at 08:05

## 2019-03-15 RX ADMIN — MEROPENEM 1 MLS/HR: 500 INJECTION INTRAVENOUS at 08:11

## 2019-03-15 RX ADMIN — VITAMIN C SCH CAP: TAB at 17:13

## 2019-03-15 RX ADMIN — LEVOTHYROXINE SODIUM 1 MCG: 50 TABLET ORAL at 08:05

## 2019-03-15 RX ADMIN — LEVALBUTEROL HYDROCHLORIDE SCH MG: 0.63 SOLUTION RESPIRATORY (INHALATION) at 17:10

## 2019-03-15 RX ADMIN — Medication SCH CAP: at 08:05

## 2019-03-15 RX ADMIN — THERA TABS 1 TAB: TAB at 08:05

## 2019-03-15 RX ADMIN — INSULIN ASPART 1 UNIT: 100 INJECTION, SOLUTION INTRAVENOUS; SUBCUTANEOUS at 17:21

## 2019-03-15 RX ADMIN — LEVALBUTEROL HYDROCHLORIDE 1 MG: 0.63 SOLUTION RESPIRATORY (INHALATION) at 10:33

## 2019-03-15 RX ADMIN — PANTOPRAZOLE SODIUM 1 MG: 40 TABLET, DELAYED RELEASE ORAL at 17:15

## 2019-03-15 RX ADMIN — PANTOPRAZOLE SODIUM SCH MG: 40 TABLET, DELAYED RELEASE ORAL at 17:15

## 2019-03-15 RX ADMIN — INSULIN ASPART 1 UNIT: 100 INJECTION, SOLUTION INTRAVENOUS; SUBCUTANEOUS at 20:52

## 2019-03-15 RX ADMIN — Medication SCH CAP: at 20:52

## 2019-03-15 RX ADMIN — LEVALBUTEROL HYDROCHLORIDE 1 MG: 0.63 SOLUTION RESPIRATORY (INHALATION) at 00:00

## 2019-03-15 RX ADMIN — PANTOPRAZOLE SODIUM 1 MG: 40 TABLET, DELAYED RELEASE ORAL at 06:00

## 2019-03-15 RX ADMIN — INSULIN ASPART 1 UNIT: 100 INJECTION, SOLUTION INTRAVENOUS; SUBCUTANEOUS at 08:00

## 2019-03-15 RX ADMIN — SEVELAMER CARBONATE 1 GM: 800 POWDER, FOR SUSPENSION ORAL at 12:13

## 2019-03-15 RX ADMIN — OXYCODONE HYDROCHLORIDE AND ACETAMINOPHEN 1 MG: 500 TABLET ORAL at 08:05

## 2019-03-15 RX ADMIN — SEVELAMER CARBONATE SCH GM: 800 POWDER, FOR SUSPENSION ORAL at 17:13

## 2019-03-15 RX ADMIN — DOCUSATE SODIUM SCH MG: 100 CAPSULE, LIQUID FILLED ORAL at 20:52

## 2019-03-15 RX ADMIN — THERA TABS SCH TAB: TAB at 08:05

## 2019-03-15 RX ADMIN — Medication 1 CAP: at 08:05

## 2019-03-15 RX ADMIN — SEVELAMER CARBONATE SCH GM: 800 POWDER, FOR SUSPENSION ORAL at 08:06

## 2019-03-15 RX ADMIN — ATORVASTATIN CALCIUM SCH MG: 20 TABLET, FILM COATED ORAL at 20:52

## 2019-03-15 RX ADMIN — SEVELAMER CARBONATE 1 GM: 800 POWDER, FOR SUSPENSION ORAL at 08:06

## 2019-03-15 RX ADMIN — LEVALBUTEROL HYDROCHLORIDE SCH MG: 0.63 SOLUTION RESPIRATORY (INHALATION) at 00:00

## 2019-03-15 RX ADMIN — SEVELAMER CARBONATE 1 GM: 800 POWDER, FOR SUSPENSION ORAL at 17:13

## 2019-03-15 RX ADMIN — LEVALBUTEROL HYDROCHLORIDE 1 MG: 0.63 SOLUTION RESPIRATORY (INHALATION) at 17:10

## 2019-03-15 RX ADMIN — Medication 1 CAP: at 20:52

## 2019-03-15 RX ADMIN — PANTOPRAZOLE SODIUM SCH MG: 40 TABLET, DELAYED RELEASE ORAL at 06:00

## 2019-03-15 RX ADMIN — LEVOTHYROXINE SODIUM SCH MCG: 50 TABLET ORAL at 08:05

## 2019-03-15 RX ADMIN — VITAMIN C 1 CAP: TAB at 17:13

## 2019-03-15 RX ADMIN — ATORVASTATIN CALCIUM 1 MG: 20 TABLET, FILM COATED ORAL at 20:52

## 2019-03-15 RX ADMIN — FERROUS SULFATE TAB 325 MG (65 MG ELEMENTAL FE) SCH MG: 325 (65 FE) TAB at 08:05

## 2019-03-15 NOTE — CONS
Assessment/Plan


Assessment/Plan


Hospital Course (Demo Recall)


- fever, diaphoresis and chills associated with dialysis prior to admission and 


again on 3/10/2019, concerning for possible catheter related bloodstream 


infection. Another possibility is fever due to GIB


- ESRD on HD via HD catheter on R chest wall. Pt does not remember how old the 


catheter is


- Pyuria r/o UTI


- GIB, scheduled for EGD on 3/11/2019


- DM - Hgb A1c 6.8%


- CAD


- LLL infiltrate vs. atelectasis on CXR upon admission but Pt does not have resp


Sx


- s/p type 2 MI vs. demand ischemia due to acute on chronic anemia


- acute on chronic anemia


- prostatic hypertrophy


- hypothyroidism


- s/p coronary artery bypass grafting


- h/o previous MI


- h/o diabetic wound of RLE


- status post right BKA





Recommendations: 


- Pending: Blood cultures from 3/11/19 (NGTD), procalcitonin from 3/10 (in 


process), and repeat blood cx from HD catheter 3/12/19 (NGTD)


- Discontinue IV vancomycin (3/8/2019-) and renally dosed meropenem (3/11/2019-)


and monitor off antibiotics. 


Management d/w patient and with Dr. Forde in detail. 


Thank you





Consultation Date/Type/Reason


Admit Date/Time


Mar 7, 2019 at 13:32


Initial Consult Date


3/10/19


Type of Consult


ID


Requesting Provider:  JEREMY PADILLA NP


Date/Time of Note


DATE: 3/15/19 


TIME: 14:00





24 HR Interval Summary


Free Text/Dictation


The patient has remained afebrile with no acute issues reported by nursing.


The patient tells me "i feel fine, no problemas." 





When I was performing his physical assessment, he suddenly jerked his body and 


made a painful sounding noise. I was startled and screamed in fright and asked 


him if he was okay. He then began to laugh loudly at me. Periodically for the 


rest of physical assessment, he attempted to do the same each time I moved to 


another body system such as abdomen, or palpating pulses. He continued to laugh 


each time with this.





Exam/Review of Systems


Exam


Vitals





Vital Signs


  Date      Temp  Pulse  Resp  B/P (MAP)   Pulse Ox  O2          O2 Flow    FiO2


Time                                                 Delivery    Rate


   3/15/19           69


     12:05


   3/15/19  98.1           18  96/54 (68)        96


     11:12


   3/15/19                                                                    21


     10:37


   3/14/19                                           Room Air


     19:15


   3/13/19                                                             2.0


     09:15








Intake and Output





3/14/19


3/14/19


3/15/19





1515:00


23:00


07:00





IntakeIntake Total


1640 ml


30 ml





OutputOutput Total


2650 ml


100 ml





BalanceBalance


-1010 ml


-70 ml











Allergies


Coded Allergies


  No Known Allergy (Unverified3/7/19)


Exam


Constitutional:  alert, oriented, well developed, other (laughing)


Psych:  no complaints, nl mood/affect


Head:  normocephalic


Eyes:  nl conjunctiva, nl lids, nl sclera


ENMT:  nl external ears & nose, nl nasal mucosa & septum, mucosa pink and moist


Neck:  supple, non-tender


Respiratory:  clear to auscultation, normal air movement


Cardiovascular:  regular rate and rhythm, nl pulses


Gastrointestinal:  soft, non-tender, bowel sounds (normoactive )


Genitourinary - Male:  other (Right chest HD catheter - site is c/d/i without 


erythema, drainage, or tenderness around the catheter site. )


Extremities:  other (R healed BKA)


Neurological:  CNS II-XII intact, nl mental status, nl speech, nl strength


Skin:  nl turgor; other (bruising left hip/flank noted)


   No rash or lesions





Results


Result Diagram:  


3/15/19 0944                                                                    


           3/15/19 0944





Results 24hrs





Laboratory Tests


Test
                 3/14/19
17:29  3/14/19
21:04  3/15/19
07:42  3/15/19
09:44


Bedside Glucose               146            140            140


White Blood Count                                                         8.7  #


Red Blood Count                                                          3.57  L


Hemoglobin                                                               11.6  L


Hematocrit                                                               35.4  L


Mean Corpuscular                                                          99.2


Volume


Mean Corpuscular                                                          32.5


Hemoglobin


Mean Corpuscular      
              
              
                    32.8  



Hemoglobin
Concent


Red Cell                                                                 17.5  H


Distribution Width


Platelet Count                                                             169


Mean Platelet Volume                                                     10.5  H


Immature                                                                0.900  H


Granulocytes %


Neutrophils %                                                             69.5


Lymphocytes %                                                             16.0


Monocytes %                                                              12.4  H


Eosinophils %                                                              0.9


Basophils %                                                                0.3


Nucleated Red Blood                                                        0.0


Cells %


Immature                                                                0.080  H


Granulocytes #


Neutrophils #                                                              6.0


Lymphocytes #                                                              1.4


Monocytes #                                                               1.1  H


Eosinophils #                                                              0.1


Basophils #                                                                0.0


Nucleated Red Blood                                                        0.0


Cells #


Sodium Level                                                               138


Potassium Level                                                            4.3


Chloride Level                                                             95  L


Carbon Dioxide Level                                                        28


Anion Gap                                                                  15  H


Blood Urea Nitrogen                                                       38  #H


Creatinine                                                               5.71  H


Est Glomerular        
              
              
                     10  L



Filtrat Rate
mL/min


Glucose Level                                                              213


Calcium Level                                                              9.3


Phosphorus Level                                                           4.4


Magnesium Level                                                            2.3


Total Bilirubin                                                            0.4


Direct Bilirubin                                                          0.00


Indirect Bilirubin                                                         0.4


Aspartate Amino       
              
              
                      25  



Transf
(AST/SGOT)


Alanine               
              
              
                      24  



Aminotransferase
(AL


T/SGPT)


Alkaline Phosphatase                                                      148  H


Total Protein                                                              8.1


Albumin                                                                    4.3


Globulin                                                                 3.80  H


Albumin/Globulin                                                          1.13


Ratio


Test
                 3/15/19
12:12  
              
              



Bedside Glucose              235  H








Medications


Medication





Current Medications


IV Flush (NS 3 ml) 3 ml PER PROTOCOL IV ;  Start 3/7/19 at 16:00


Insulin Aspart (Novolog Insulin Pen) NOVOLOG *MILD* ALGORITHM WITH MEALS  


BEDTIME SC  Last administered on 3/15/19at 12:17; Admin Dose 3 UNIT;  Start 


3/7/19 at 18:00


Ascorbic Acid (Vitamin C) 500 mg DAILY PO  Last administered on 3/15/19at 08:05;


Admin Dose 500 MG;  Start 3/8/19 at 09:00


Docusate Sodium (Colace) 200 mg HS PO  Last administered on 3/14/19at 21:05; 


Admin Dose 200 MG;  Start 3/7/19 at 21:00


Ferrous Sulfate (Ferrous Sulfate (Ec)) 325 mg DAILY PO  Last administered on 


3/15/19at 08:05; Admin Dose 325 MG;  Start 3/8/19 at 09:00


Levothyroxine Sodium (Synthroid) 50 mcg BEFORE  BREAKFAST PO  Last administered 


on 3/15/19at 08:05; Admin Dose 50 MCG;  Start 3/8/19 at 07:00


Multivitamins Therapeutic (Theragran) 1 tab DAILY PO  Last administered on 


3/15/19at 08:05; Admin Dose 1 TAB;  Start 3/8/19 at 09:00


Sevelamer Carbonate (Renvela) 0.8 gm WITH  MEALS PO  Last administered on 


3/15/19at 12:13; Admin Dose 0.8 GM;  Start 3/7/19 at 18:00


Vancomycin HCl (Vanco Iv Per Pharmacy) VANCOMYCIN PER PHARMACY PER PROTOCOL XX ;


 Start 3/8/19 at 07:30


Epoetin Connor (Epogen (Esrd)) 10,000 units MoWeFr@17 SC  Last administered on 


3/13/19at 17:10; Admin Dose 10,000 UNITS;  Start 3/8/19 at 17:00


Carvedilol (Coreg) 3.125 mg BID PO  Last administered on 3/15/19at 08:07; Admin 


Dose 3.125 MG;  Start 3/9/19 at 15:00


Ondansetron HCl (Zofran Inj) 4 mg Q4H  PRN IV NAUSEA AND/OR VOMITING Last 


administered on 3/10/19at 14:05; Admin Dose 4 MG;  Start 3/10/19 at 14:00


Acetaminophen (Tylenol Tab) 500 mg Q6H  PRN PO MILD PAIN(1-3)OR ELEVATED TEMP;  


Start 3/10/19 at 17:00


Meropenem/Sodium Chloride 50 ml @  100 mls/hr Q24H IVPB  Last administered on 


3/15/19at 08:11; Admin Dose 100 MLS/HR;  Start 3/11/19 at 09:00


Levalbuterol (Xopenex Neb) 0.63 mg Q8H RESP  THERAPY HHN  Last administered on 


3/15/19at 10:33; Admin Dose 0.63 MG;  Start 3/12/19 at 00:00


Miscellaneous Information 1 ea NOTE XX ;  Start 3/12/19 at 15:30


Glucose (Glutose) 15 gm Q15M  PRN PO DECREASED GLUCOSE;  Start 3/12/19 at 15:30


Glucose (Glutose) 22.5 gm Q15M  PRN PO DECREASED GLUCOSE;  Start 3/12/19 at 


15:30


Dextrose (D50w Syringe) 25 ml Q15M  PRN IV DECREASED GLUCOSE;  Start 3/12/19 at 


15:30


Dextrose (D50w Syringe) 50 ml Q15M  PRN IV DECREASED GLUCOSE;  Start 3/12/19 at 


15:30


Glucagon (Glucagen) 1 mg Q15M  PRN IM DECREASED GLUCOSE;  Start 3/12/19 at 15:30


Glucose (Glutose) 15 gm Q15M  PRN BUCCAL DECREASED GLUCOSE;  Start 3/12/19 at 


15:30


Vancomycin HCl 250 ml @  125 mls/hr Q96H IVPB  Last administered on 3/13/19at 


15:52; Admin Dose 125 MLS/HR;  Start 3/13/19 at 16:00


Lactobacillus Acidophilus/ Rhamnosus (Culturelle) 1 cap BID PO  Last 


administered on 3/15/19at 08:05; Admin Dose 1 CAP;  Start 3/13/19 at 21:00


Atorvastatin Calcium (Lipitor) 20 mg QHS PO  Last administered on 3/14/19at 


21:05; Admin Dose 20 MG;  Start 3/13/19 at 21:00


Vitamin B Complex/ Vitamin C (Berocca) 1 cap DAILY PO ;  Start 3/15/19 at 09:00


Pantoprazole (Protonix Tab) 40 mg BID@0600,1800 PO  Last administered on 3/1


4/19at 18:02; Admin Dose 40 MG;  Start 3/13/19 at 18:00


Heparin Sodium (Porcine) (Heparin (1000 Units/ml)) 3,300 unit PRN  PRN CATHETER 


Dialysis Last administered on 3/14/19at 17:29; Admin Dose 3,300 UNIT;  Start 


3/14/19 at 17:00











QI COOPER NP             Mar 15, 2019 14:02

## 2019-03-15 NOTE — CONS
Assessment/Plan


Assessment/Plan


Hospital Course (Demo Recall)


SIRS/sepsis


Preserved ejection fraction


Myocardial infarction


Acute blood loss anemia


End-stage renal disease on hemodialysis


Diabetes


History of open heart surgery-likely CABG





-Continue statin therapy


-Titrate beta-blocker as heart rate and blood pressure permits


-No antiplatelet or anticoagulant therapy secondary to severe blood loss anemia


-Fluid management via hemodialysis as per nephrology





Consultation Date/Type/Reason


Admit Date/Time


Mar 7, 2019 at 13:32


Initial Consult Date





Type of Consult


Cardiology


Requesting Provider:  JEREMY PADILLA NP


Date/Time of Note


DATE: 3/15/19 


TIME: 12:15





24 HR Interval Summary


Free Text/Dictation


Denies shortness of breath, chest pain, dizziness





Exam/Review of Systems


Vital Signs


Vitals





Vital Signs


  Date      Temp  Pulse  Resp  B/P (MAP)   Pulse Ox  O2          O2 Flow    FiO2


Time                                                 Delivery    Rate


   3/15/19           69


     12:05


   3/15/19  98.1           18  96/54 (68)        96


     11:12


   3/15/19                                                                    21


     10:37


   3/14/19                                           Room Air


     19:15


   3/13/19                                                             2.0


     09:15








Intake and Output





3/14/19


3/14/19


3/15/19





1515:00


23:00


07:00





IntakeIntake Total


1640 ml


30 ml





OutputOutput Total


2650 ml


100 ml





BalanceBalance


-1010 ml


-70 ml














Exam


Constitutional:  alert, oriented (No apparent distress)


Respiratory:  other (Coarse breath sounds bilaterally, no wheezing)


Cardiovascular:  regular rate and rhythm (S1-S2 heard)


Gastrointestinal:  soft, non-tender, bowel sounds


Extremities:  other (Amputation)





Labs


Result Diagram:  


3/15/19 0944                                                                    


           3/15/19 0944





Results 24hrs





Laboratory Tests


Test
                 3/14/19
17:29  3/14/19
21:04  3/15/19
07:42  3/15/19
09:44


Bedside Glucose               146            140            140


White Blood Count                                                         8.7  #


Red Blood Count                                                          3.57  L


Hemoglobin                                                               11.6  L


Hematocrit                                                               35.4  L


Mean Corpuscular                                                          99.2


Volume


Mean Corpuscular                                                          32.5


Hemoglobin


Mean Corpuscular      
              
              
                    32.8  



Hemoglobin
Concent


Red Cell                                                                 17.5  H


Distribution Width


Platelet Count                                                             169


Mean Platelet Volume                                                     10.5  H


Immature                                                                0.900  H


Granulocytes %


Neutrophils %                                                             69.5


Lymphocytes %                                                             16.0


Monocytes %                                                              12.4  H


Eosinophils %                                                              0.9


Basophils %                                                                0.3


Nucleated Red Blood                                                        0.0


Cells %


Immature                                                                0.080  H


Granulocytes #


Neutrophils #                                                              6.0


Lymphocytes #                                                              1.4


Monocytes #                                                               1.1  H


Eosinophils #                                                              0.1


Basophils #                                                                0.0


Nucleated Red Blood                                                        0.0


Cells #


Sodium Level                                                               138


Potassium Level                                                            4.3


Chloride Level                                                             95  L


Carbon Dioxide Level                                                        28


Anion Gap                                                                  15  H


Blood Urea Nitrogen                                                       38  #H


Creatinine                                                               5.71  H


Est Glomerular        
              
              
                     10  L



Filtrat Rate
mL/min


Glucose Level                                                              213


Calcium Level                                                              9.3


Phosphorus Level                                                           4.4


Magnesium Level                                                            2.3


Total Bilirubin                                                            0.4


Direct Bilirubin                                                          0.00


Indirect Bilirubin                                                         0.4


Aspartate Amino       
              
              
                      25  



Transf
(AST/SGOT)


Alanine               
              
              
                      24  



Aminotransferase
(AL


T/SGPT)


Alkaline Phosphatase                                                      148  H


Total Protein                                                              8.1


Albumin                                                                    4.3


Globulin                                                                 3.80  H


Albumin/Globulin                                                          1.13


Ratio








Medications


Medications





Current Medications


IV Flush (NS 3 ml) 3 ml PER PROTOCOL IV ;  Start 3/7/19 at 16:00


Insulin Aspart (Novolog Insulin Pen) NOVOLOG *MILD* ALGORITHM WITH MEALS  


BEDTIME SC  Last administered on 3/14/19at 17:37; Admin Dose 1 UNIT;  Start 


3/7/19 at 18:00


Ascorbic Acid (Vitamin C) 500 mg DAILY PO  Last administered on 3/15/19at 08:05;


Admin Dose 500 MG;  Start 3/8/19 at 09:00


Docusate Sodium (Colace) 200 mg HS PO  Last administered on 3/14/19at 21:05; 


Admin Dose 200 MG;  Start 3/7/19 at 21:00


Ferrous Sulfate (Ferrous Sulfate (Ec)) 325 mg DAILY PO  Last administered on 


3/15/19at 08:05; Admin Dose 325 MG;  Start 3/8/19 at 09:00


Levothyroxine Sodium (Synthroid) 50 mcg BEFORE  BREAKFAST PO  Last administered 


on 3/15/19at 08:05; Admin Dose 50 MCG;  Start 3/8/19 at 07:00


Multivitamins Therapeutic (Theragran) 1 tab DAILY PO  Last administered on 


3/15/19at 08:05; Admin Dose 1 TAB;  Start 3/8/19 at 09:00


Sevelamer Carbonate (Renvela) 0.8 gm WITH  MEALS PO  Last administered on 


3/15/19at 08:06; Admin Dose 0.8 GM;  Start 3/7/19 at 18:00


Vancomycin HCl (Vanco Iv Per Pharmacy) VANCOMYCIN PER PHARMACY PER PROTOCOL XX ;


 Start 3/8/19 at 07:30


Epoetin Connor (Epogen (Esrd)) 10,000 units MoWeFr@17 SC  Last administered on 


3/13/19at 17:10; Admin Dose 10,000 UNITS;  Start 3/8/19 at 17:00


Carvedilol (Coreg) 3.125 mg BID PO  Last administered on 3/15/19at 08:07; Admin 


Dose 3.125 MG;  Start 3/9/19 at 15:00


Ondansetron HCl (Zofran Inj) 4 mg Q4H  PRN IV NAUSEA AND/OR VOMITING Last 


administered on 3/10/19at 14:05; Admin Dose 4 MG;  Start 3/10/19 at 14:00


Acetaminophen (Tylenol Tab) 500 mg Q6H  PRN PO MILD PAIN(1-3)OR ELEVATED TEMP;  


Start 3/10/19 at 17:00


Meropenem/Sodium Chloride 50 ml @  100 mls/hr Q24H IVPB  Last administered on 


3/15/19at 08:11; Admin Dose 100 MLS/HR;  Start 3/11/19 at 09:00


Levalbuterol (Xopenex Neb) 0.63 mg Q8H RESP  THERAPY HHN  Last administered on 


3/15/19at 10:33; Admin Dose 0.63 MG;  Start 3/12/19 at 00:00


Miscellaneous Information 1 ea NOTE XX ;  Start 3/12/19 at 15:30


Glucose (Glutose) 15 gm Q15M  PRN PO DECREASED GLUCOSE;  Start 3/12/19 at 15:30


Glucose (Glutose) 22.5 gm Q15M  PRN PO DECREASED GLUCOSE;  Start 3/12/19 at 


15:30


Dextrose (D50w Syringe) 25 ml Q15M  PRN IV DECREASED GLUCOSE;  Start 3/12/19 at 


15:30


Dextrose (D50w Syringe) 50 ml Q15M  PRN IV DECREASED GLUCOSE;  Start 3/12/19 at 


15:30


Glucagon (Glucagen) 1 mg Q15M  PRN IM DECREASED GLUCOSE;  Start 3/12/19 at 15:30


Glucose (Glutose) 15 gm Q15M  PRN BUCCAL DECREASED GLUCOSE;  Start 3/12/19 at 


15:30


Vancomycin HCl 250 ml @  125 mls/hr Q96H IVPB  Last administered on 3/13/19at 


15:52; Admin Dose 125 MLS/HR;  Start 3/13/19 at 16:00


Lactobacillus Acidophilus/ Rhamnosus (Culturelle) 1 cap BID PO  Last adminis


tered on 3/15/19at 08:05; Admin Dose 1 CAP;  Start 3/13/19 at 21:00


Atorvastatin Calcium (Lipitor) 20 mg QHS PO  Last administered on 3/14/19at 


21:05; Admin Dose 20 MG;  Start 3/13/19 at 21:00


Vitamin B Complex/ Vitamin C (Berocca) 1 cap DAILY PO ;  Start 3/15/19 at 09:00


Pantoprazole (Protonix Tab) 40 mg BID@0600,1800 PO  Last administered on 


3/14/19at 18:02; Admin Dose 40 MG;  Start 3/13/19 at 18:00


Heparin Sodium (Porcine) (Heparin (1000 Units/ml)) 3,300 unit PRN  PRN CATHETER 


Dialysis Last administered on 3/14/19at 17:29; Admin Dose 3,300 UNIT;  Start 


3/14/19 at 17:00











Steve Wesley DO           Mar 15, 2019 12:16

## 2019-03-15 NOTE — PN
DATE:  03/15/2019

 

 

SUBJECTIVE:  The patient had hemodialysis yesterday, tolerated well.  No other events noted.

 

OBJECTIVE:

VITAL SIGNS:  Blood pressure is 100/45, pulse 70, respiration is 19, temperature 97.7.

HEENT:  Head is normocephalic.

NECK:  Supple.

HEART:  Regular rate.

LUNGS:  Show diminished breath sounds at base.

ABDOMEN:  Soft, nontender to palpation without rebound or guarding.

EXTREMITIES:  Negative for clubbing, cyanosis, no edema.

DERMATOLOGIC:  No rashes.

MUSCULOSKELETAL:  No joint effusion.

NEUROLOGIC:  No change in exam.

 

MEDICATIONS:  Reviewed.

 

LABORATORY DATA:  Has been reviewed.

 

ASSESSMENT AND PLAN:

1.  End-stage renal disease.  The patient had hemodialysis yesterday and tolerated well.  Plan for di
alysis tomorrow.

2.  Anemia.  Monitor hemoglobin and hematocrit levels.  Continue Epogen.

3.  Mineral bone disorder, monitor calcium and phosphorus levels.

4.  Gastritis.  The patient is status post EGD.  Continue proton pump inhibitor.

5.  Sepsis.  Etiology is likely secondary to pneumonia.  The patient's blood cultures have been negat
jae to date.  Continue current antibiotic regimen.  Follow up with infectious disease.

6.  Acute respiratory failure, improved.

7.  Encephalopathy, improved.

8.  Diabetes.  Continue current insulin regimen.

9.  Coronary artery disease.  Continue medical management.

10.  Hypothyroidism.  Continue Synthroid.

11.  Hypertension.  Continue current blood pressure regimen.

12.  Dyslipidemia.

 

 

Dictated By: ELODIA RAHMAN DO

 

NR/NTS

DD:    03/15/2019 08:18:06

DT:    03/15/2019 09:07:17

Conf#: [id number]

DID#:  [id number]

CC: STEPHANE SENA MD;*End*

## 2019-03-15 NOTE — PN
Date/Time of Note


Date/Time of Note


DATE: 3/15/19 


TIME: 19:49





Assessment/Plan


VTE Prophylaxis


Risk score (from Ns)>0 risk:  3


SCD applied (from Ns):  No


SCD contraindicated:  low risk/ambulating


Pharmacological prophylaxis:  NA/contraindicated


Pharm contraindication:  bleeding





Lines/Catheters


IV Catheter Type (from [facility]):  Saline Lock


Urinary Cath still in place:  No





Assessment/Plan


Hospital Course





A/P


1.  Sepsis, severe due to hcap, stable finishing antibiotics


2.  HCAP stable finishing antibiotics


3.  Acute hypoxic respiratory failure multifactorial- #2 vs DD vs anemia assoc 


pul edema, stable improved, O2 as needed


4.  Anemia, mod/ severe, possible acute blood loss, sp EGD: Gastritis, follow-up


on path. outpt colonoscopy. No asa ~ 2 weeks


5.  Abn troponin: Secondary MI vs primary NSTEMI. no chest pain/ arrhythmias, 


therefore continue medical management.


6.  Chr CAD/CABG/ consider stress cath as indicated. Not a candidate for asa due


to #4.


7.  Failure to thrive, transfer to snf Saturday; needs advanced care planning 


reevaluated


8.  Hypothyroidism


9.  Type 2 diabetes


10.  BPH


11.  ESRD stable continue dialysis Tuesday Thursday Saturday


12.  Pulmonary hypertension?


13.  Dialysis access right chest infection? appears not a source of infection at


present.


14.  Right BKA status


15.  Nonadherence patient has some frustrating behavior.





S: 


3/11: events noted


3/12: No events


3/13 no distress.  Tolerating diet.  No active bleed.  No fever.  


3/14: Refused insulin sliding coverage. No fever dyspnea or diarrhea.


3/15: no distress





O: vss





PE


No pallor/ JVD


Reg no m/r/g


Dimin bilaterally; rt access c/d/i


Bs+ nt nd no RRG


No edema rt bka status


Result Diagram:  


3/15/19 0944                                                                    


           3/15/19 0944





Results 24hrs





Laboratory Tests


Test
                 3/14/19
21:04  3/15/19
07:42  3/15/19
09:44  3/15/19
12:12


Bedside Glucose               140            140                          235  H


White Blood Count                                          8.7  #


Red Blood Count                                           3.57  L


Hemoglobin                                                11.6  L


Hematocrit                                                35.4  L


Mean Corpuscular                                           99.2


Volume


Mean Corpuscular                                           32.5


Hemoglobin


Mean Corpuscular      
              
                    32.8  
  



Hemoglobin
Concent


Red Cell                                                  17.5  H


Distribution Width


Platelet Count                                              169


Mean Platelet Volume                                      10.5  H


Immature                                                 0.900  H


Granulocytes %


Neutrophils %                                              69.5


Lymphocytes %                                              16.0


Monocytes %                                               12.4  H


Eosinophils %                                               0.9


Basophils %                                                 0.3


Nucleated Red Blood                                         0.0


Cells %


Immature                                                 0.080  H


Granulocytes #


Neutrophils #                                               6.0


Lymphocytes #                                               1.4


Monocytes #                                                1.1  H


Eosinophils #                                               0.1


Basophils #                                                 0.0


Nucleated Red Blood                                         0.0


Cells #


Sodium Level                                                138


Potassium Level                                             4.3


Chloride Level                                              95  L


Carbon Dioxide Level                                         28


Anion Gap                                                   15  H


Blood Urea Nitrogen                                        38  #H


Creatinine                                                5.71  H


Est Glomerular        
              
                     10  L
  



Filtrat Rate
mL/min


Glucose Level                                               213


Calcium Level                                               9.3


Phosphorus Level                                            4.4


Magnesium Level                                             2.3


Total Bilirubin                                             0.4


Direct Bilirubin                                           0.00


Indirect Bilirubin                                          0.4


Aspartate Amino       
              
                      25  
  



Transf
(AST/SGOT)


Alanine               
              
                      24  
  



Aminotransferase
(AL


T/SGPT)


Alkaline Phosphatase                                       148  H


Total Protein                                               8.1


Albumin                                                     4.3


Globulin                                                  3.80  H


Albumin/Globulin                                           1.13


Ratio


Test
                 3/15/19
17:10  
              
              



Bedside Glucose               176








Exam/Review of Systems


Exam


Vitals





Vital Signs


  Date      Temp  Pulse  Resp  B/P (MAP)   Pulse Ox  O2          O2 Flow    FiO2


Time                                                 Delivery    Rate


   3/15/19  97.9     68    19      118/46        97


     19:37                           (70)


   3/15/19                                                                    21


     17:18


   3/15/19                                           Aerosol


     17:16                                           Mask


   3/13/19                                                             2.0


     09:15








Intake and Output





3/14/19


3/14/19


3/15/19





1515:00


23:00


07:00





IntakeIntake Total


1640 ml


30 ml





OutputOutput Total


2650 ml


100 ml





BalanceBalance


-1010 ml


-70 ml














Results


Results 24hrs





Laboratory Tests


Test
                 3/14/19
21:04  3/15/19
07:42  3/15/19
09:44  3/15/19
12:12


Bedside Glucose               140            140                          235  H


White Blood Count                                          8.7  #


Red Blood Count                                           3.57  L


Hemoglobin                                                11.6  L


Hematocrit                                                35.4  L


Mean Corpuscular                                           99.2


Volume


Mean Corpuscular                                           32.5


Hemoglobin


Mean Corpuscular      
              
                    32.8  
  



Hemoglobin
Concent


Red Cell                                                  17.5  H


Distribution Width


Platelet Count                                              169


Mean Platelet Volume                                      10.5  H


Immature                                                 0.900  H


Granulocytes %


Neutrophils %                                              69.5


Lymphocytes %                                              16.0


Monocytes %                                               12.4  H


Eosinophils %                                               0.9


Basophils %                                                 0.3


Nucleated Red Blood                                         0.0


Cells %


Immature                                                 0.080  H


Granulocytes #


Neutrophils #                                               6.0


Lymphocytes #                                               1.4


Monocytes #                                                1.1  H


Eosinophils #                                               0.1


Basophils #                                                 0.0


Nucleated Red Blood                                         0.0


Cells #


Sodium Level                                                138


Potassium Level                                             4.3


Chloride Level                                              95  L


Carbon Dioxide Level                                         28


Anion Gap                                                   15  H


Blood Urea Nitrogen                                        38  #H


Creatinine                                                5.71  H


Est Glomerular        
              
                     10  L
  



Filtrat Rate
mL/min


Glucose Level                                               213


Calcium Level                                               9.3


Phosphorus Level                                            4.4


Magnesium Level                                             2.3


Total Bilirubin                                             0.4


Direct Bilirubin                                           0.00


Indirect Bilirubin                                          0.4


Aspartate Amino       
              
                      25  
  



Transf
(AST/SGOT)


Alanine               
              
                      24  
  



Aminotransferase
(AL


T/SGPT)


Alkaline Phosphatase                                       148  H


Total Protein                                               8.1


Albumin                                                     4.3


Globulin                                                  3.80  H


Albumin/Globulin                                           1.13


Ratio


Test
                 3/15/19
17:10  
              
              



Bedside Glucose               176








Medications


Medication





Current Medications


IV Flush (NS 3 ml) 3 ml PER PROTOCOL IV ;  Start 3/7/19 at 16:00


Insulin Aspart (Novolog Insulin Pen) NOVOLOG *MILD* ALGORITHM WITH MEALS  


BEDTIME SC  Last administered on 3/15/19at 17:21; Admin Dose 1 UNIT;  Start 


3/7/19 at 18:00


Ascorbic Acid (Vitamin C) 500 mg DAILY PO  Last administered on 3/15/19at 08:05;


Admin Dose 500 MG;  Start 3/8/19 at 09:00


Docusate Sodium (Colace) 200 mg HS PO  Last administered on 3/14/19at 21:05; 


Admin Dose 200 MG;  Start 3/7/19 at 21:00


Ferrous Sulfate (Ferrous Sulfate (Ec)) 325 mg DAILY PO  Last administered on 


3/15/19at 08:05; Admin Dose 325 MG;  Start 3/8/19 at 09:00


Levothyroxine Sodium (Synthroid) 50 mcg BEFORE  BREAKFAST PO  Last administered 


on 3/15/19at 08:05; Admin Dose 50 MCG;  Start 3/8/19 at 07:00


Multivitamins Therapeutic (Theragran) 1 tab DAILY PO  Last administered on 


3/15/19at 08:05; Admin Dose 1 TAB;  Start 3/8/19 at 09:00


Sevelamer Carbonate (Renvela) 0.8 gm WITH  MEALS PO  Last administered on 


3/15/19at 17:13; Admin Dose 0.8 GM;  Start 3/7/19 at 18:00


Vancomycin HCl (Vanco Iv Per Pharmacy) VANCOMYCIN PER PHARMACY PER PROTOCOL XX ;


 Start 3/8/19 at 07:30


Epoetin Connor (Epogen (Esrd)) 10,000 units MoWeFr@17 SC  Last administered on 


3/13/19at 17:10; Admin Dose 10,000 UNITS;  Start 3/8/19 at 17:00;  Status Hold


Carvedilol (Coreg) 3.125 mg BID PO  Last administered on 3/15/19at 08:07; Admin 


Dose 3.125 MG;  Start 3/9/19 at 15:00


Ondansetron HCl (Zofran Inj) 4 mg Q4H  PRN IV NAUSEA AND/OR VOMITING Last 


administered on 3/10/19at 14:05; Admin Dose 4 MG;  Start 3/10/19 at 14:00


Acetaminophen (Tylenol Tab) 500 mg Q6H  PRN PO MILD PAIN(1-3)OR ELEVATED TEMP;  


Start 3/10/19 at 17:00


Meropenem/Sodium Chloride 50 ml @  100 mls/hr Q24H IVPB  Last administered on 


3/15/19at 08:11; Admin Dose 100 MLS/HR;  Start 3/11/19 at 09:00


Levalbuterol (Xopenex Neb) 0.63 mg Q8H RESP  THERAPY HHN  Last administered on 


3/15/19at 17:10; Admin Dose 0.63 MG;  Start 3/12/19 at 00:00


Miscellaneous Information 1 ea NOTE XX ;  Start 3/12/19 at 15:30


Glucose (Glutose) 15 gm Q15M  PRN PO DECREASED GLUCOSE;  Start 3/12/19 at 15:30


Glucose (Glutose) 22.5 gm Q15M  PRN PO DECREASED GLUCOSE;  Start 3/12/19 at 


15:30


Dextrose (D50w Syringe) 25 ml Q15M  PRN IV DECREASED GLUCOSE;  Start 3/12/19 at 


15:30


Dextrose (D50w Syringe) 50 ml Q15M  PRN IV DECREASED GLUCOSE;  Start 3/12/19 at 


15:30


Glucagon (Glucagen) 1 mg Q15M  PRN IM DECREASED GLUCOSE;  Start 3/12/19 at 15:30


Glucose (Glutose) 15 gm Q15M  PRN BUCCAL DECREASED GLUCOSE;  Start 3/12/19 at 


15:30


Vancomycin HCl 250 ml @  125 mls/hr Q96H IVPB  Last administered on 3/13/19at 


15:52; Admin Dose 125 MLS/HR;  Start 3/13/19 at 16:00


Lactobacillus Acidophilus/ Rhamnosus (Culturelle) 1 cap BID PO  Last 


administered on 3/15/19at 08:05; Admin Dose 1 CAP;  Start 3/13/19 at 21:00


Atorvastatin Calcium (Lipitor) 20 mg QHS PO  Last administered on 3/14/19at 


21:05; Admin Dose 20 MG;  Start 3/13/19 at 21:00


Vitamin B Complex/ Vitamin C (Berocca) 1 cap DAILY PO  Last administered on 


3/15/19at 17:13; Admin Dose 1 CAP;  Start 3/15/19 at 09:00


Pantoprazole (Protonix Tab) 40 mg BID@0600,1800 PO  Last administered on 


3/15/19at 17:15; Admin Dose 40 MG;  Start 3/13/19 at 18:00


Heparin Sodium (Porcine) (Heparin (1000 Units/ml)) 3,300 unit PRN  PRN CATHETER 


Dialysis Last administered on 3/14/19at 17:29; Admin Dose 3,300 UNIT;  Start 


3/14/19 at 17:00











JORGE DAVID MD         Mar 15, 2019 19:51

## 2019-03-16 VITALS — HEART RATE: 77 BPM

## 2019-03-16 VITALS — RESPIRATION RATE: 18 BRPM | HEART RATE: 75 BPM | DIASTOLIC BLOOD PRESSURE: 64 MMHG | SYSTOLIC BLOOD PRESSURE: 85 MMHG

## 2019-03-16 VITALS — HEART RATE: 69 BPM

## 2019-03-16 VITALS — HEART RATE: 71 BPM | SYSTOLIC BLOOD PRESSURE: 116 MMHG | DIASTOLIC BLOOD PRESSURE: 40 MMHG

## 2019-03-16 VITALS — SYSTOLIC BLOOD PRESSURE: 116 MMHG | RESPIRATION RATE: 19 BRPM | HEART RATE: 73 BPM | DIASTOLIC BLOOD PRESSURE: 65 MMHG

## 2019-03-16 VITALS — HEART RATE: 81 BPM | DIASTOLIC BLOOD PRESSURE: 35 MMHG | SYSTOLIC BLOOD PRESSURE: 105 MMHG

## 2019-03-16 VITALS — DIASTOLIC BLOOD PRESSURE: 42 MMHG | SYSTOLIC BLOOD PRESSURE: 114 MMHG | HEART RATE: 72 BPM

## 2019-03-16 VITALS — DIASTOLIC BLOOD PRESSURE: 54 MMHG | HEART RATE: 75 BPM | RESPIRATION RATE: 18 BRPM | SYSTOLIC BLOOD PRESSURE: 138 MMHG

## 2019-03-16 VITALS — HEART RATE: 60 BPM

## 2019-03-16 VITALS — RESPIRATION RATE: 16 BRPM | DIASTOLIC BLOOD PRESSURE: 43 MMHG | SYSTOLIC BLOOD PRESSURE: 93 MMHG | HEART RATE: 74 BPM

## 2019-03-16 VITALS — SYSTOLIC BLOOD PRESSURE: 119 MMHG | DIASTOLIC BLOOD PRESSURE: 58 MMHG | RESPIRATION RATE: 19 BRPM | HEART RATE: 72 BPM

## 2019-03-16 VITALS — DIASTOLIC BLOOD PRESSURE: 37 MMHG | SYSTOLIC BLOOD PRESSURE: 96 MMHG | HEART RATE: 71 BPM

## 2019-03-16 VITALS — HEART RATE: 75 BPM | SYSTOLIC BLOOD PRESSURE: 115 MMHG | DIASTOLIC BLOOD PRESSURE: 44 MMHG

## 2019-03-16 VITALS — DIASTOLIC BLOOD PRESSURE: 42 MMHG | HEART RATE: 74 BPM | SYSTOLIC BLOOD PRESSURE: 100 MMHG

## 2019-03-16 VITALS — DIASTOLIC BLOOD PRESSURE: 38 MMHG | SYSTOLIC BLOOD PRESSURE: 99 MMHG | HEART RATE: 74 BPM

## 2019-03-16 VITALS — SYSTOLIC BLOOD PRESSURE: 99 MMHG | HEART RATE: 76 BPM | DIASTOLIC BLOOD PRESSURE: 34 MMHG

## 2019-03-16 VITALS — SYSTOLIC BLOOD PRESSURE: 99 MMHG | DIASTOLIC BLOOD PRESSURE: 37 MMHG | HEART RATE: 75 BPM

## 2019-03-16 VITALS — HEART RATE: 72 BPM | DIASTOLIC BLOOD PRESSURE: 32 MMHG | SYSTOLIC BLOOD PRESSURE: 98 MMHG

## 2019-03-16 VITALS — RESPIRATION RATE: 18 BRPM | SYSTOLIC BLOOD PRESSURE: 134 MMHG | DIASTOLIC BLOOD PRESSURE: 67 MMHG | HEART RATE: 81 BPM

## 2019-03-16 VITALS — DIASTOLIC BLOOD PRESSURE: 42 MMHG | HEART RATE: 76 BPM | SYSTOLIC BLOOD PRESSURE: 106 MMHG

## 2019-03-16 VITALS — HEART RATE: 72 BPM

## 2019-03-16 VITALS — SYSTOLIC BLOOD PRESSURE: 112 MMHG | DIASTOLIC BLOOD PRESSURE: 49 MMHG | HEART RATE: 76 BPM

## 2019-03-16 VITALS — SYSTOLIC BLOOD PRESSURE: 121 MMHG | RESPIRATION RATE: 16 BRPM | DIASTOLIC BLOOD PRESSURE: 41 MMHG | HEART RATE: 72 BPM

## 2019-03-16 VITALS — HEART RATE: 76 BPM

## 2019-03-16 VITALS — HEART RATE: 71 BPM

## 2019-03-16 VITALS — DIASTOLIC BLOOD PRESSURE: 39 MMHG | HEART RATE: 76 BPM | SYSTOLIC BLOOD PRESSURE: 117 MMHG

## 2019-03-16 LAB — PROCALCITONIN: 50.34 NG/ML (ref ?–0.1)

## 2019-03-16 RX ADMIN — PANTOPRAZOLE SODIUM SCH MG: 40 TABLET, DELAYED RELEASE ORAL at 06:00

## 2019-03-16 RX ADMIN — HEPARIN SODIUM 1 UNIT: 1000 INJECTION, SOLUTION INTRAVENOUS; SUBCUTANEOUS at 13:44

## 2019-03-16 RX ADMIN — FERROUS SULFATE TAB 325 MG (65 MG ELEMENTAL FE) SCH MG: 325 (65 FE) TAB at 08:33

## 2019-03-16 RX ADMIN — INSULIN ASPART 1 UNIT: 100 INJECTION, SOLUTION INTRAVENOUS; SUBCUTANEOUS at 20:13

## 2019-03-16 RX ADMIN — SEVELAMER CARBONATE SCH GM: 800 POWDER, FOR SUSPENSION ORAL at 12:12

## 2019-03-16 RX ADMIN — SEVELAMER CARBONATE SCH GM: 800 POWDER, FOR SUSPENSION ORAL at 17:32

## 2019-03-16 RX ADMIN — FERROUS SULFATE TAB 325 MG (65 MG ELEMENTAL FE) 1 MG: 325 (65 FE) TAB at 08:33

## 2019-03-16 RX ADMIN — THERA TABS 1 TAB: TAB at 08:33

## 2019-03-16 RX ADMIN — PANTOPRAZOLE SODIUM 1 MG: 40 TABLET, DELAYED RELEASE ORAL at 17:32

## 2019-03-16 RX ADMIN — SEVELAMER CARBONATE 1 GM: 800 POWDER, FOR SUSPENSION ORAL at 12:12

## 2019-03-16 RX ADMIN — VITAMIN C SCH CAP: TAB at 08:33

## 2019-03-16 RX ADMIN — INSULIN ASPART 1 UNIT: 100 INJECTION, SOLUTION INTRAVENOUS; SUBCUTANEOUS at 17:28

## 2019-03-16 RX ADMIN — DOCUSATE SODIUM 1 MG: 100 CAPSULE, LIQUID FILLED ORAL at 20:06

## 2019-03-16 RX ADMIN — Medication 1 CAP: at 20:06

## 2019-03-16 RX ADMIN — MEROPENEM 1 MLS/HR: 500 INJECTION INTRAVENOUS at 08:33

## 2019-03-16 RX ADMIN — PANTOPRAZOLE SODIUM 1 MG: 40 TABLET, DELAYED RELEASE ORAL at 06:00

## 2019-03-16 RX ADMIN — HEPARIN SODIUM PRN UNIT: 1000 INJECTION, SOLUTION INTRAVENOUS; SUBCUTANEOUS at 13:44

## 2019-03-16 RX ADMIN — INSULIN ASPART 1 UNIT: 100 INJECTION, SOLUTION INTRAVENOUS; SUBCUTANEOUS at 12:14

## 2019-03-16 RX ADMIN — ATORVASTATIN CALCIUM SCH MG: 20 TABLET, FILM COATED ORAL at 20:06

## 2019-03-16 RX ADMIN — SEVELAMER CARBONATE SCH GM: 800 POWDER, FOR SUSPENSION ORAL at 08:33

## 2019-03-16 RX ADMIN — DOCUSATE SODIUM SCH MG: 100 CAPSULE, LIQUID FILLED ORAL at 20:06

## 2019-03-16 RX ADMIN — Medication SCH CAP: at 08:33

## 2019-03-16 RX ADMIN — Medication 1 CAP: at 08:33

## 2019-03-16 RX ADMIN — INSULIN ASPART 1 UNIT: 100 INJECTION, SOLUTION INTRAVENOUS; SUBCUTANEOUS at 08:39

## 2019-03-16 RX ADMIN — Medication SCH CAP: at 20:06

## 2019-03-16 RX ADMIN — PANTOPRAZOLE SODIUM SCH MG: 40 TABLET, DELAYED RELEASE ORAL at 17:32

## 2019-03-16 RX ADMIN — OXYCODONE HYDROCHLORIDE AND ACETAMINOPHEN 1 MG: 500 TABLET ORAL at 08:33

## 2019-03-16 RX ADMIN — ACETAMINOPHEN 1 MG: 500 TABLET, FILM COATED ORAL at 20:08

## 2019-03-16 RX ADMIN — OXYCODONE HYDROCHLORIDE AND ACETAMINOPHEN SCH MG: 500 TABLET ORAL at 08:33

## 2019-03-16 RX ADMIN — SEVELAMER CARBONATE 1 GM: 800 POWDER, FOR SUSPENSION ORAL at 17:32

## 2019-03-16 RX ADMIN — THERA TABS SCH TAB: TAB at 08:33

## 2019-03-16 RX ADMIN — VITAMIN C 1 CAP: TAB at 08:33

## 2019-03-16 RX ADMIN — SEVELAMER CARBONATE 1 GM: 800 POWDER, FOR SUSPENSION ORAL at 08:33

## 2019-03-16 RX ADMIN — ATORVASTATIN CALCIUM 1 MG: 20 TABLET, FILM COATED ORAL at 20:06

## 2019-03-16 NOTE — PDOCDIS
Discharge Instructions


CONDITION


                [id number]
Patient Condition:  [id number]
Stable








HOME CARE INSTRUCTIONS:


         [id number]
Diet Instructions:  [id number]
Low Fat /Cholesterol








ACTIVITY:


     [id number]
Activity Restrictions:  [id number]
Slowly Increase Activity


                                        Do not Drive








FOLLOW UP/APPOINTMENTS


Follow-up Plan


dialysis as before


Dr Lemus 1wk


Dr Cannon 1-2wks


Dr Forde 2-4wks











JORGE DAVID MD         Mar 16, 2019 15:14

## 2019-03-16 NOTE — PN
Date/Time of Note


Date/Time of Note


DATE: 3/16/19 


TIME: 09:59





Assessment/Plan


VTE Prophylaxis


Risk score (from Ns)>0 risk:  9


SCD applied (from Ns):  No


SCD contraindicated:  other


Pharmacological prophylaxis:  other





Lines/Catheters


IV Catheter Type (from [facility]):  Saline Lock


Urinary Cath still in place:  No





Assessment/Plan


Hospital Course


renal follow up





no significant event overnight


last hd was 2 days ago


no fever, chills or melena


 


OBJECTIVE:


HEENT:  Head is normocephalic.


NECK:  Supple.


HEART:  Regular rate.


LUNGS:  Show diminished breath sounds at base.


ABDOMEN:  Soft, nontender to palpation without rebound or guarding.


EXTREMITIES:  Negative for clubbing, cyanosis, no edema.


DERMATOLOGIC:  No rashes.


MUSCULOSKELETAL:  No joint effusion.


NEUROLOGIC:  No change in exam.


 


MEDICATIONS:  Reviewed.


 


LABORATORY DATA:  Has been reviewed.


 


ASSESSMENT AND PLAN:


1.  End-stage renal disease.   continue hd today 


2.  Anemia.  Monitor hemoglobin and hematocrit levels.  Continue Epogen.


3.  Mineral bone disorder, monitor calcium and phosphorus levels.


4.  Gastritis.  The patient is status post EGD.  Continue proton pump inhibitor.


5.  Sepsis.  Etiology is likely secondary to pneumonia.  The patient's blood 


cultures have been negative to date.  Continue current antibiotic regimen.  


Follow up with infectious disease.


6.  Acute respiratory failure, improved.


7.  Encephalopathy, improved.


8.  Diabetes.  Continue current insulin regimen.


9.  Coronary artery disease.  Continue medical management.


10.  Hypothyroidism.  Continue Synthroid.


11.  Hypertension.  Continue current blood pressure regimen.


12.  Dyslipidemia.


Result Diagram:  


3/15/19 0944                                                                    


           3/15/19 0944





Results 24hrs





Laboratory Tests


  Test
            3/15/19
12:12  3/15/19
17:10  3/15/19
20:25  3/16/19
07:48


  Bedside Glucose         235  H          176           245  H          170








Exam/Review of Systems


Exam


Vitals





Vital Signs


  Date      Temp  Pulse  Resp  B/P (MAP)   Pulse Ox  O2          O2 Flow    FiO2


Time                                                 Delivery    Rate


   3/16/19           72


     08:00


   3/16/19  98.4           19      119/58        99


     07:36                           (78)


   3/15/19                                                                    21


     17:18


   3/15/19                                           Aerosol


     17:16                                           Mask


   3/13/19                                                             2.0


     09:15








Intake and Output





3/15/19


3/15/19


3/16/19





1414:59


22:59


06:59





IntakeIntake Total


600 ml


150 ml





OutputOutput Total


100 ml





BalanceBalance


500 ml


150 ml














Results


Results 24hrs





Laboratory Tests


  Test
            3/15/19
12:12  3/15/19
17:10  3/15/19
20:25  3/16/19
07:48


  Bedside Glucose         235  H          176           245  H          170








Medications


Medication





Current Medications


IV Flush (NS 3 ml) 3 ml PER PROTOCOL IV ;  Start 3/7/19 at 16:00


Insulin Aspart (Novolog Insulin Pen) NOVOLOG *MILD* ALGORITHM WITH MEALS  


BEDTIME SC  Last administered on 3/16/19at 08:39; Admin Dose 1 UNIT;  Start 


3/7/19 at 18:00


Ascorbic Acid (Vitamin C) 500 mg DAILY PO  Last administered on 3/16/19at 08:33;


Admin Dose 500 MG;  Start 3/8/19 at 09:00


Docusate Sodium (Colace) 200 mg HS PO  Last administered on 3/15/19at 20:52; 


Admin Dose 200 MG;  Start 3/7/19 at 21:00


Ferrous Sulfate (Ferrous Sulfate (Ec)) 325 mg DAILY PO  Last administered on 


3/16/19at 08:33; Admin Dose 325 MG;  Start 3/8/19 at 09:00


Levothyroxine Sodium (Synthroid) 50 mcg BEFORE  BREAKFAST PO  Last administered 


on 3/15/19at 08:05; Admin Dose 50 MCG;  Start 3/8/19 at 07:00


Multivitamins Therapeutic (Theragran) 1 tab DAILY PO  Last administered on 


3/16/19at 08:33; Admin Dose 1 TAB;  Start 3/8/19 at 09:00


Sevelamer Carbonate (Renvela) 0.8 gm WITH  MEALS PO  Last administered on 


3/16/19at 08:33; Admin Dose 0.8 GM;  Start 3/7/19 at 18:00


Vancomycin HCl (Vanco Iv Per Pharmacy) VANCOMYCIN PER PHARMACY PER PROTOCOL XX ;


 Start 3/8/19 at 07:30


Epoetin Connor (Epogen (Esrd)) 10,000 units MoWeFr@17 SC  Last administered on 


3/13/19at 17:10; Admin Dose 10,000 UNITS;  Start 3/8/19 at 17:00;  Status Hold


Carvedilol (Coreg) 3.125 mg BID PO  Last administered on 3/15/19at 20:52; Admin 


Dose 3.125 MG;  Start 3/9/19 at 15:00


Ondansetron HCl (Zofran Inj) 4 mg Q4H  PRN IV NAUSEA AND/OR VOMITING Last 


administered on 3/10/19at 14:05; Admin Dose 4 MG;  Start 3/10/19 at 14:00


Acetaminophen (Tylenol Tab) 500 mg Q6H  PRN PO MILD PAIN(1-3)OR ELEVATED TEMP;  


Start 3/10/19 at 17:00


Meropenem/Sodium Chloride 50 ml @  100 mls/hr Q24H IVPB  Last administered on 


3/16/19at 08:33; Admin Dose 100 MLS/HR;  Start 3/11/19 at 09:00


Miscellaneous Information 1 ea NOTE XX ;  Start 3/12/19 at 15:30


Glucose (Glutose) 15 gm Q15M  PRN PO DECREASED GLUCOSE;  Start 3/12/19 at 15:30


Glucose (Glutose) 22.5 gm Q15M  PRN PO DECREASED GLUCOSE;  Start 3/12/19 at 


15:30


Dextrose (D50w Syringe) 25 ml Q15M  PRN IV DECREASED GLUCOSE;  Start 3/12/19 at 


15:30


Dextrose (D50w Syringe) 50 ml Q15M  PRN IV DECREASED GLUCOSE;  Start 3/12/19 at 


15:30


Glucagon (Glucagen) 1 mg Q15M  PRN IM DECREASED GLUCOSE;  Start 3/12/19 at 15:30


Glucose (Glutose) 15 gm Q15M  PRN BUCCAL DECREASED GLUCOSE;  Start 3/12/19 at 


15:30


Vancomycin HCl 250 ml @  125 mls/hr Q96H IVPB  Last administered on 3/13/19at 


15:52; Admin Dose 125 MLS/HR;  Start 3/13/19 at 16:00


Lactobacillus Acidophilus/ Rhamnosus (Culturelle) 1 cap BID PO  Last 


administered on 3/16/19at 08:33; Admin Dose 1 CAP;  Start 3/13/19 at 21:00


Atorvastatin Calcium (Lipitor) 20 mg QHS PO  Last administered on 3/15/19at 


20:52; Admin Dose 20 MG;  Start 3/13/19 at 21:00


Vitamin B Complex/ Vitamin C (Berocca) 1 cap DAILY PO  Last administered on 


3/16/19at 08:33; Admin Dose 1 CAP;  Start 3/15/19 at 09:00


Pantoprazole (Protonix Tab) 40 mg BID@0600,1800 PO  Last administered on 3/15/19


at 17:15; Admin Dose 40 MG;  Start 3/13/19 at 18:00


Heparin Sodium (Porcine) (Heparin (1000 Units/ml)) 3,300 unit PRN  PRN CATHETER 


Dialysis Last administered on 3/14/19at 17:29; Admin Dose 3,300 UNIT;  Start 


3/14/19 at 17:00


Levalbuterol (Xopenex Neb) 0.63 mg Q4H RESP THERAPY  PRN HHN WHEEZING AND SOB;  


Start 3/15/19 at 20:00











CONSTANTINO MATTHEW DO              Mar 16, 2019 10:00

## 2019-03-16 NOTE — PN
Date/Time of Note


Date/Time of Note


DATE: 3/16/19 


TIME: 17:07





Assessment/Plan


VTE Prophylaxis


Risk score (from Ns)>0 risk:  8


SCD applied (from Ns):  No


SCD contraindicated:  low risk/ambulating


Pharmacological prophylaxis:  LMWH





Lines/Catheters


IV Catheter Type (from [facility]):  Saline Lock


Urinary Cath still in place:  No





Assessment/Plan


Hospital Course





A/P


1.  Sepsis, severe due to hcap, stable finishing antibiotics


2.  HCAP stable finishing antibiotics


3.  Acute hypoxic respiratory failure multifactorial- #2 vs DD vs anemia assoc 


pul edema, stable improved, O2 as needed


4.  Anemia, mod/ severe, possible acute blood loss, sp EGD: Gastritis, follow-up


on path. outpt colonoscopy. No asa ~ 2 weeks


5.  Abn troponin: Secondary MI vs primary NSTEMI. no chest pain/ arrhythmias, 


therefore continue medical management.


6.  Chr CAD/CABG/ consider stress cath as indicated. Not a candidate for asa due


to #4.


7.  Failure to thrive, transfer to snf Saturday; needs advanced care planning 


reevaluated


8.  Hypothyroidism


9.  Type 2 diabetes


10.  BPH


11.  ESRD stable continue dialysis Tuesday Thursday Saturday


12.  Pulmonary hypertension?


13.  Dialysis access right chest infection? appears not a source of infection at


present.


14.  Right BKA status


15.  Nonadherence patient has some frustrating behavior.





S: 


3/11: events noted


3/12: No events


3/13 no distress.  Tolerating diet.  No active bleed.  No fever.  


3/14: Refused insulin sliding coverage. No fever dyspnea or diarrhea.


3/15: no distress 


3/16: No distress.





O: vss





PE


No pallor/ JVD


Reg no m/r/g


Dimin bilaterally; rt access c/d/i


Bs+ nt nd no RRG


No edema rt bka status


Result Diagram:  


3/15/19 0944                                                                    


           3/15/19 0944





Results 24hrs





Laboratory Tests


  Test
            3/15/19
17:10  3/15/19
20:25  3/16/19
07:48  3/16/19
12:11


  Bedside Glucose          176           245  H          170            170








Exam/Review of Systems


Exam


Vitals





Vital Signs


  Date      Temp  Pulse  Resp  B/P (MAP)   Pulse Ox  O2          O2 Flow    FiO2


Time                                                 Delivery    Rate


   3/16/19           77


     16:00


   3/16/19  99.6           18      134/67        93


     15:50                           (89)


   3/16/19                                           Room Air


     13:50


   3/15/19                                                                    21


     17:18


   3/13/19                                                             2.0


     09:15








Intake and Output





3/15/19


3/15/19


3/16/19





1515:00


23:00


07:00





IntakeIntake Total


600 ml


150 ml





OutputOutput Total


100 ml





BalanceBalance


500 ml


150 ml














Results


Results 24hrs





Laboratory Tests


  Test
            3/15/19
17:10  3/15/19
20:25  3/16/19
07:48  3/16/19
12:11


  Bedside Glucose          176           245  H          170            170








Medications


Medication





Current Medications


IV Flush (NS 3 ml) 3 ml PER PROTOCOL IV ;  Start 3/7/19 at 16:00


Insulin Aspart (Novolog Insulin Pen) NOVOLOG *MILD* ALGORITHM WITH MEALS  


BEDTIME SC  Last administered on 3/16/19at 12:14; Admin Dose 1 UNIT;  Start 


3/7/19 at 18:00


Ascorbic Acid (Vitamin C) 500 mg DAILY PO  Last administered on 3/16/19at 08:33;


Admin Dose 500 MG;  Start 3/8/19 at 09:00


Docusate Sodium (Colace) 200 mg HS PO  Last administered on 3/15/19at 20:52; 


Admin Dose 200 MG;  Start 3/7/19 at 21:00


Ferrous Sulfate (Ferrous Sulfate (Ec)) 325 mg DAILY PO  Last administered on 3


/16/19at 08:33; Admin Dose 325 MG;  Start 3/8/19 at 09:00


Levothyroxine Sodium (Synthroid) 50 mcg BEFORE  BREAKFAST PO  Last administered 


on 3/15/19at 08:05; Admin Dose 50 MCG;  Start 3/8/19 at 07:00


Multivitamins Therapeutic (Theragran) 1 tab DAILY PO  Last administered on 


3/16/19at 08:33; Admin Dose 1 TAB;  Start 3/8/19 at 09:00


Sevelamer Carbonate (Renvela) 0.8 gm WITH  MEALS PO  Last administered on 


3/16/19at 12:12; Admin Dose 0.8 GM;  Start 3/7/19 at 18:00


Epoetin Connor (Epogen (Esrd)) 10,000 units MoWeFr@17 SC  Last administered on 


3/13/19at 17:10; Admin Dose 10,000 UNITS;  Start 3/8/19 at 17:00;  Status Hold


Carvedilol (Coreg) 3.125 mg BID PO  Last administered on 3/15/19at 20:52; Admin 


Dose 3.125 MG;  Start 3/9/19 at 15:00


Ondansetron HCl (Zofran Inj) 4 mg Q4H  PRN IV NAUSEA AND/OR VOMITING Last 


administered on 3/10/19at 14:05; Admin Dose 4 MG;  Start 3/10/19 at 14:00


Acetaminophen (Tylenol Tab) 500 mg Q6H  PRN PO MILD PAIN(1-3)OR ELEVATED TEMP;  


Start 3/10/19 at 17:00


Miscellaneous Information 1 ea NOTE XX ;  Start 3/12/19 at 15:30


Glucose (Glutose) 15 gm Q15M  PRN PO DECREASED GLUCOSE;  Start 3/12/19 at 15:30


Glucose (Glutose) 22.5 gm Q15M  PRN PO DECREASED GLUCOSE;  Start 3/12/19 at 


15:30


Dextrose (D50w Syringe) 25 ml Q15M  PRN IV DECREASED GLUCOSE;  Start 3/12/19 at 


15:30


Dextrose (D50w Syringe) 50 ml Q15M  PRN IV DECREASED GLUCOSE;  Start 3/12/19 at 


15:30


Glucagon (Glucagen) 1 mg Q15M  PRN IM DECREASED GLUCOSE;  Start 3/12/19 at 15:30


Glucose (Glutose) 15 gm Q15M  PRN BUCCAL DECREASED GLUCOSE;  Start 3/12/19 at 


15:30


Lactobacillus Acidophilus/ Rhamnosus (Culturelle) 1 cap BID PO  Last 


administered on 3/16/19at 08:33; Admin Dose 1 CAP;  Start 3/13/19 at 21:00


Atorvastatin Calcium (Lipitor) 20 mg QHS PO  Last administered on 3/15/19at 


20:52; Admin Dose 20 MG;  Start 3/13/19 at 21:00


Vitamin B Complex/ Vitamin C (Berocca) 1 cap DAILY PO  Last administered on 


3/16/19at 08:33; Admin Dose 1 CAP;  Start 3/15/19 at 09:00


Pantoprazole (Protonix Tab) 40 mg BID@0600,1800 PO  Last administered on 


3/15/19at 17:15; Admin Dose 40 MG;  Start 3/13/19 at 18:00


Heparin Sodium (Porcine) (Heparin (1000 Units/ml)) 3,300 unit PRN  PRN CATHETER 


Dialysis Last administered on 3/16/19at 13:44; Admin Dose 3,300 UNIT;  Start 


3/14/19 at 17:00


Levalbuterol (Xopenex Neb) 0.63 mg Q4H RESP THERAPY  PRN HHN WHEEZING AND SOB;  


Start 3/15/19 at 20:00











JORGE DAVID MD         Mar 16, 2019 17:08

## 2019-03-16 NOTE — CONS
Assessment/Plan


Assessment/Plan


Assessment/Plan (Daily)


- fever, diaphoresis and chills associated with dialysis prior to admission and 


again on 3/10/2019, concerning for possible catheter related bloodstream 


infection. Another possibility is fever due to GIB


- ESRD on HD via HD catheter on R chest wall. Pt does not remember how old the 


catheter is


- Pyuria r/o UTI


- GIB, scheduled for EGD on 3/11/2019


- DM - Hgb A1c 6.8%


- CAD


- LLL infiltrate vs. atelectasis on CXR upon admission but Pt does not have resp


Sx


- s/p type 2 MI vs. demand ischemia due to acute on chronic anemia


- acute on chronic anemia


- prostatic hypertrophy


- hypothyroidism


- s/p coronary artery bypass grafting


- h/o previous MI


- h/o diabetic wound of RLE


- status post right BKA





Recommendations: 


- Pending: Blood cultures from 3/11/19 (NGTD)


- procalcitonin from 3/10 - 50.34


 - repeat blood cx from HD catheter 3/12/19 (NGTD)- no growth after 24 hrs 


- Discontinue IV vancomycin (3/8/2019-) and renally dosed meropenem (3/11/2019-)


and monitor off antibiotics. 


Management d/w patient and with Dr. Forde in detail. 


Thank you





Consultation Date/Type/Reason


Admit Date/Time


Mar 7, 2019 at 13:32


Initial Consult Date


3/10/19


Requesting Provider:  JEREMY PADILLA NP


Date/Time of Note


DATE: 3/16/19 


TIME: 14:57





24 HR Interval Summary


Free Text/Dictation


- afebrile, 72, 16, 121/41, O2 SAT - 98% RA


- WBC 8.7 


- procalcitonin - 50.34


 - repeat blood cx from HD catheter 3/12/19 (NGTD)- no growth after 24 hrs





Exam/Review of Systems


Exam


Vitals





Vital Signs


  Date      Temp  Pulse  Resp  B/P (MAP)   Pulse Ox  O2          O2 Flow    FiO2


Time                                                 Delivery    Rate


   3/16/19           72    16      121/41        98  Room Air


     13:50                           (67)


   3/16/19  98.0


     11:30


   3/15/19                                                                    21


     17:18


   3/13/19                                                             2.0


     09:15








Intake and Output





3/15/19


3/15/19


3/16/19





1515:00


23:00


07:00





IntakeIntake Total


600 ml


150 ml





OutputOutput Total


100 ml





BalanceBalance


500 ml


150 ml














Results


Result Diagram:  


3/15/19 0944                                                                    


           3/15/19 0944





Results 24hrs





Laboratory Tests


  Test
            3/15/19
17:10  3/15/19
20:25  3/16/19
07:48  3/16/19
12:11


  Bedside Glucose          176           245  H          170            170








Medications


Medication





Current Medications


IV Flush (NS 3 ml) 3 ml PER PROTOCOL IV ;  Start 3/7/19 at 16:00


Insulin Aspart (Novolog Insulin Pen) NOVOLOG *MILD* ALGORITHM WITH MEALS  


BEDTIME SC  Last administered on 3/16/19at 12:14; Admin Dose 1 UNIT;  Start 


3/7/19 at 18:00


Ascorbic Acid (Vitamin C) 500 mg DAILY PO  Last administered on 3/16/19at 08:33;


Admin Dose 500 MG;  Start 3/8/19 at 09:00


Docusate Sodium (Colace) 200 mg HS PO  Last administered on 3/15/19at 20:52; 


Admin Dose 200 MG;  Start 3/7/19 at 21:00


Ferrous Sulfate (Ferrous Sulfate (Ec)) 325 mg DAILY PO  Last administered on 


3/16/19at 08:33; Admin Dose 325 MG;  Start 3/8/19 at 09:00


Levothyroxine Sodium (Synthroid) 50 mcg BEFORE  BREAKFAST PO  Last administered 


on 3/15/19at 08:05; Admin Dose 50 MCG;  Start 3/8/19 at 07:00


Multivitamins Therapeutic (Theragran) 1 tab DAILY PO  Last administered on 


3/16/19at 08:33; Admin Dose 1 TAB;  Start 3/8/19 at 09:00


Sevelamer Carbonate (Renvela) 0.8 gm WITH  MEALS PO  Last administered on 


3/16/19at 12:12; Admin Dose 0.8 GM;  Start 3/7/19 at 18:00


Epoetin Connor (Epogen (Esrd)) 10,000 units MoWeFr@17 SC  Last administered on 


3/13/19at 17:10; Admin Dose 10,000 UNITS;  Start 3/8/19 at 17:00;  Status Hold


Carvedilol (Coreg) 3.125 mg BID PO  Last administered on 3/15/19at 20:52; Admin 


Dose 3.125 MG;  Start 3/9/19 at 15:00


Ondansetron HCl (Zofran Inj) 4 mg Q4H  PRN IV NAUSEA AND/OR VOMITING Last 


administered on 3/10/19at 14:05; Admin Dose 4 MG;  Start 3/10/19 at 14:00


Acetaminophen (Tylenol Tab) 500 mg Q6H  PRN PO MILD PAIN(1-3)OR ELEVATED TEMP;  


Start 3/10/19 at 17:00


Miscellaneous Information 1 ea NOTE XX ;  Start 3/12/19 at 15:30


Glucose (Glutose) 15 gm Q15M  PRN PO DECREASED GLUCOSE;  Start 3/12/19 at 15:30


Glucose (Glutose) 22.5 gm Q15M  PRN PO DECREASED GLUCOSE;  Start 3/12/19 at 


15:30


Dextrose (D50w Syringe) 25 ml Q15M  PRN IV DECREASED GLUCOSE;  Start 3/12/19 at 


15:30


Dextrose (D50w Syringe) 50 ml Q15M  PRN IV DECREASED GLUCOSE;  Start 3/12/19 at 


15:30


Glucagon (Glucagen) 1 mg Q15M  PRN IM DECREASED GLUCOSE;  Start 3/12/19 at 15:30


Glucose (Glutose) 15 gm Q15M  PRN BUCCAL DECREASED GLUCOSE;  Start 3/12/19 at 


15:30


Lactobacillus Acidophilus/ Rhamnosus (Culturelle) 1 cap BID PO  Last 


administered on 3/16/19at 08:33; Admin Dose 1 CAP;  Start 3/13/19 at 21:00


Atorvastatin Calcium (Lipitor) 20 mg QHS PO  Last administered on 3/15/19at 


20:52; Admin Dose 20 MG;  Start 3/13/19 at 21:00


Vitamin B Complex/ Vitamin C (Berocca) 1 cap DAILY PO  Last administered on 


3/16/19at 08:33; Admin Dose 1 CAP;  Start 3/15/19 at 09:00


Pantoprazole (Protonix Tab) 40 mg BID@0600,1800 PO  Last administered on 


3/15/19at 17:15; Admin Dose 40 MG;  Start 3/13/19 at 18:00


Heparin Sodium (Porcine) (Heparin (1000 Units/ml)) 3,300 unit PRN  PRN CATHETER 


Dialysis Last administered on 3/16/19at 13:44; Admin Dose 3,300 UNIT;  Start 


3/14/19 at 17:00


Levalbuterol (Xopenex Neb) 0.63 mg Q4H RESP THERAPY  PRN HHN WHEEZING AND SOB;  


Start 3/15/19 at 20:00











LM HINES                 Mar 16, 2019 15:03

## 2019-03-17 VITALS — HEART RATE: 70 BPM

## 2019-03-17 VITALS — HEART RATE: 71 BPM

## 2019-03-17 VITALS — DIASTOLIC BLOOD PRESSURE: 55 MMHG | RESPIRATION RATE: 18 BRPM | HEART RATE: 68 BPM | SYSTOLIC BLOOD PRESSURE: 122 MMHG

## 2019-03-17 VITALS — DIASTOLIC BLOOD PRESSURE: 56 MMHG | HEART RATE: 68 BPM | SYSTOLIC BLOOD PRESSURE: 128 MMHG | RESPIRATION RATE: 18 BRPM

## 2019-03-17 VITALS — HEART RATE: 69 BPM | DIASTOLIC BLOOD PRESSURE: 61 MMHG | SYSTOLIC BLOOD PRESSURE: 141 MMHG | RESPIRATION RATE: 18 BRPM

## 2019-03-17 VITALS — HEART RATE: 73 BPM | DIASTOLIC BLOOD PRESSURE: 56 MMHG | RESPIRATION RATE: 18 BRPM | SYSTOLIC BLOOD PRESSURE: 130 MMHG

## 2019-03-17 VITALS — HEART RATE: 68 BPM

## 2019-03-17 VITALS — HEART RATE: 72 BPM

## 2019-03-17 VITALS — DIASTOLIC BLOOD PRESSURE: 58 MMHG | RESPIRATION RATE: 18 BRPM | HEART RATE: 72 BPM | SYSTOLIC BLOOD PRESSURE: 135 MMHG

## 2019-03-17 RX ADMIN — OXYCODONE HYDROCHLORIDE AND ACETAMINOPHEN 1 MG: 500 TABLET ORAL at 08:34

## 2019-03-17 RX ADMIN — OXYCODONE HYDROCHLORIDE AND ACETAMINOPHEN SCH MG: 500 TABLET ORAL at 08:34

## 2019-03-17 RX ADMIN — SEVELAMER CARBONATE 1 GM: 800 POWDER, FOR SUSPENSION ORAL at 11:56

## 2019-03-17 RX ADMIN — FERROUS SULFATE TAB 325 MG (65 MG ELEMENTAL FE) 1 MG: 325 (65 FE) TAB at 08:35

## 2019-03-17 RX ADMIN — VITAMIN C 1 CAP: TAB at 08:34

## 2019-03-17 RX ADMIN — PANTOPRAZOLE SODIUM SCH MG: 40 TABLET, DELAYED RELEASE ORAL at 06:00

## 2019-03-17 RX ADMIN — Medication SCH CAP: at 08:35

## 2019-03-17 RX ADMIN — THERA TABS 1 TAB: TAB at 08:34

## 2019-03-17 RX ADMIN — SEVELAMER CARBONATE SCH GM: 800 POWDER, FOR SUSPENSION ORAL at 07:55

## 2019-03-17 RX ADMIN — SEVELAMER CARBONATE 1 GM: 800 POWDER, FOR SUSPENSION ORAL at 07:55

## 2019-03-17 RX ADMIN — FERROUS SULFATE TAB 325 MG (65 MG ELEMENTAL FE) SCH MG: 325 (65 FE) TAB at 08:35

## 2019-03-17 RX ADMIN — INSULIN ASPART 1 UNIT: 100 INJECTION, SOLUTION INTRAVENOUS; SUBCUTANEOUS at 11:42

## 2019-03-17 RX ADMIN — PANTOPRAZOLE SODIUM SCH MG: 40 TABLET, DELAYED RELEASE ORAL at 06:09

## 2019-03-17 RX ADMIN — VITAMIN C SCH CAP: TAB at 08:34

## 2019-03-17 RX ADMIN — INSULIN ASPART 1 UNIT: 100 INJECTION, SOLUTION INTRAVENOUS; SUBCUTANEOUS at 07:55

## 2019-03-17 RX ADMIN — PANTOPRAZOLE SODIUM 1 MG: 40 TABLET, DELAYED RELEASE ORAL at 06:00

## 2019-03-17 RX ADMIN — PANTOPRAZOLE SODIUM 1 MG: 40 TABLET, DELAYED RELEASE ORAL at 06:09

## 2019-03-17 RX ADMIN — THERA TABS SCH TAB: TAB at 08:34

## 2019-03-17 RX ADMIN — Medication 1 CAP: at 08:35

## 2019-03-17 RX ADMIN — LEVOTHYROXINE SODIUM 1 MCG: 50 TABLET ORAL at 06:09

## 2019-03-17 RX ADMIN — LEVOTHYROXINE SODIUM SCH MCG: 50 TABLET ORAL at 06:09

## 2019-03-17 RX ADMIN — SEVELAMER CARBONATE SCH GM: 800 POWDER, FOR SUSPENSION ORAL at 11:56

## 2019-03-17 NOTE — DS
Date/Time of Note


Date/Time of Note


DATE: 3/17/19 


TIME: 18:37





Discharge Summary


Admission/Discharge Info


Admit Date/Time


Mar 7, 2019 at 13:32


Discharge Date/Time


Mar 17, 2019 at 16:38


Patient Condition:  Stable


Consults





Allan Cannon


Procedures





CTA chest 3/7


IMPRESSION:


1.  Normal CT pulmonary angiogram with no evidence of pulmonary artery embolism.


2.  Atelectasis or pneumonia at the left lung base posteriorly and laterally.


3.  Atherosclerosis.


4.  Cardiomegaly.


5.  Coronary artery calcification.


6.  Previous median sternotomy.


7.  Tunneled dialysis catheter in satisfactory position.


8.  Otherwise unremarkable study.     


_____________________________________________ 





XR Chest.  3/15


FINDINGS: 


Postoperative changes from open thoracic surgery with sternotomy wires.


Right-sided central venous catheter in satisfactory position.


Mildly increased interstitial edema suggesting cardiopulmonary congestion. 


Similar appearance of left basilar infiltrates.


No pleural effusion.


No pneumothorax. 


Mild cardiomegaly unchanged.


Vascular calcifications of the aorta are present compatible with 


atherosclerosis.


 


IMPRESSION:


Mildly increased interstitial edema suggesting cardiopulmonary congestion. 


Similar appearance of left basilar infiltrates.


Hx of Present Illness





68-year-old gentleman transferred from [facility] due to fever and diaphoresis.  


Abnormal labs in the ER including troponin and hemoglobin.


Hospital Course





Hospitalist coverage/hospital course


Admitted with fever diaphoresis.  Patient had significant anemia and therefore 


required EGD GI eval.  Gastritis seen no active bleed.  Recommend to hold 


aspirin for 2 weeks total.  Requested to follow-up with GI may be in 3-4 weeks. 


In terms of the fever diaphoresis there is suspicion of healthcare associated 


pneumonia.  There was suspicion of line infection but his cultures remain 


negative.  He does have a temporary axis which ideally should come out as soon 


is possible.  Patient has finished his antibiotics and is now on observation 


therapy.  May check chest x-ray in 2 weeks.  Patient is stable and fit for 


discharge back to [facility].  He is tolerating diet no distress.  He does have 


intermittent agitation anxiety.  Due to his comorbidities recommend advanced 


care planning be reevaluated.





A/P


1.  Sepsis, severe due to hcap, stable finished antibiotics


2.  HCAP stable finished antibiotics


3.  Acute hypoxic respiratory failure multifactorial- #2 vs DD vs anemia assoc 


pul edema, stable improved, O2 as needed


4.  Anemia, mod/ severe, possible acute blood loss, sp EGD: Gastritis, follow-up


on path. outpt colonoscopy. No asa ~ 2 weeks


5.  Abn troponin: Secondary MI vs primary NSTEMI. no chest pain/ arrhythmias, 


therefore continue medical management.


6.  Chr CAD/CABG/ consider stress cath as indicated. Not a candidate for asa for


a total of 2 weeks due to #4.


7.  Failure to thrive, transfer to snf; needs advanced care planning reevaluated


8.  Hypothyroidism


9.  Type 2 diabetes


10.  BPH


11.  ESRD stable continue dialysis Tuesday Thursday Saturday


12.  Pulmonary hypertension?


13.  Dialysis access right chest infection? appears not a source of infection at


present.


14.  Right BKA status


15.  Nonadherence patient has some frustrating behavior.





S: 


3/11: events noted


3/12: No events


3/13 no distress.  Tolerating diet.  No active bleed.  No fever.  


3/14: Refused insulin sliding coverage. No fever dyspnea or diarrhea.


3/15: no distress 


3/16: No distress


3/17: No events





O: vss





PE


No pallor/ JVD


Reg no m/r/g


Dimin bilaterally; rt access c/d/i


Bs+ nt nd no RRG


No edema rt bka status


Home Meds


Active Scripts


Pantoprazole* (Pantoprazole*) 40 Mg Tablet., 40 MG PO BID@0600,1800 for 14 


Days


   Prov:JORGE DAVID MD         3/16/19


Lactobacillus Rhamnosus GG (Culturelle) 1 Each Capsule, 1 CAP PO BID for 10 


Days, CAP


   Prov:JORGE DAVID MD         3/16/19


Carvedilol* (Carvedilol*) 3.125 Mg Tablet, 3.125 MG PO BID for 1 Day, TAB


   Prov:JORGE DAVID MD         3/16/19


Reported Medications


Polyvinyl Alcohol (Tears Again) 15 Ml Drops, 15 ML OP DAILY PRN for DRY EYES, 


BOTTLE


   3/7/19


Ondansetron Hcl* (Zofran*) 4 Mg Tab, 4 MG PO Q6H PRN for NAUSEA AND OR VOMITING,


TAB


   3/7/19


Ascorbic Acid* (Vitamin C*) 500 Mg Capsule.sa, 500 MG PO DAILY, CAP


   3/7/19


Acetaminophen* (Acetaminophen*) 325 Mg Tablet, 325 MG PO Q4H PRN for PAIN AND OR


ELEVATED TEMP, #30 TAB


   3/7/19


Levothyroxine Sodium* (Levoxyl*) 50 Mcg Tablet, 50 MCG PO BEFORE BREAKFAST, #30 


TAB


   3/7/19


Sevelamer Carbonate* (Renvela*) 800 Mg Tablet, 0.8 GM PO WITH MEALS, TAB


   3/7/19


Multivitamins* (Theragran*) 1 Tab Tab, 1 TAB PO DAILY, TAB


   3/7/19


Atorvastatin Calcium* (Atorvastatin Calcium*) 20 Mg Tablet, 20 MG PO QHS, #30 


TAB


   3/7/19


Atorvastatin Calcium* (Atorvastatin Calcium*) 20 Mg Tablet, 20 MG PO QHS, #30 


TAB


   3/7/19


Insulin Glargine* (Lantus*) 100 Unit/Ml Soln, 10 UNIT SC QHS, #1 VIAL


   3/7/19


Ferrous Sulfate* (Ferrous Sulfate*) 325 Mg Tabec, 325 MG PO DAILY, TAB


   3/7/19


Cranberry Extract (Cranberry) 425 Mg Capsule, 425 MG PO DAILY, CAP


   3/7/19


Docusate Sodium* (Docusate Sodium*) 100 Mg Capsule, 200 MG PO HS, #60 CAP


   3/7/19


Cyanocobalamin/FA/Pyridoxine (B Complex-Folic Acid Tablet) 1 Each Tablet, 1 TAB 


PO DAILY, TAB


   3/7/19


Discontinued Reported Medications


Acetaminophen* (Acetaminophen*) 500 MG Extra Strength Tablet, 1000 MG PO Q4H  


PRN for MILD PAIN(1-3)OR ELEVATED TEMP, TAB


   3/7/19


Lisinopril* (Lisinopril*) 20 Mg Tablet, 20 MG PO DAILY for MON,WED,FRI,SUN., #30


TAB


   3/7/19


Carvedilol* (Coreg*) 6.25 Mg Tablet, 6.25 MG PO BID for TUE,THU,SAT, #60 TAB


   3/7/19


Aspirin* (Aspirin* Chew) 81 Mg Tab.chew, 81 MG PO DAILY, TAB.CHEW


   3/7/19


Follow-up Plan


dialysis as before


Dr Lemus 1wk


Dr Cannon 1-2wks


Dr Forde 2-4wks


Primary Care Provider


Not On Staff Doctor


Time spent on discharge:   > 30 minutes


Pending Labs





Laboratory Tests


  Test
                  3/16/19
20:04       3/17/19
07:45       3/17/19
11:28


  Bedside Glucose
  255 mg/dL
()  165 mg/dL
()  159 mg/dL
()














JORGE DAVID MD         Mar 17, 2019 18:42

## 2019-03-17 NOTE — PN
Date/Time of Note


Date/Time of Note


DATE: 3/17/19 


TIME: 11:10





Assessment/Plan


VTE Prophylaxis


Risk score (from Ns)>0 risk:  9


SCD applied (from Ns):  No


SCD contraindicated:  other


Pharmacological prophylaxis:  other





Lines/Catheters


IV Catheter Type (from [facility]):  Saline Lock


Urinary Cath still in place:  No





Assessment/Plan


Hospital Course


renal follow up





no significant event overnight


last hd was yesterday


no fever, chills or melena


 


OBJECTIVE:


HEENT:  Head is normocephalic.


NECK:  Supple.


HEART:  Regular rate.


LUNGS:  Show diminished breath sounds at base.


ABDOMEN:  Soft, nontender to palpation without rebound or guarding.


EXTREMITIES:  Negative for clubbing, cyanosis, no edema.


DERMATOLOGIC:  No rashes.


MUSCULOSKELETAL:  No joint effusion.


NEUROLOGIC:  No change in exam.


 


MEDICATIONS:  Reviewed.


 


LABORATORY DATA:  Has been reviewed.


 


ASSESSMENT AND PLAN:


1.  End-stage renal disease.   continue hd in 1-2 days


2.  Anemia.  Monitor hemoglobin and hematocrit levels.  Continue Epogen.


3.  Mineral bone disorder, monitor calcium and phosphorus levels.


4.  Gastritis.  The patient is status post EGD.  Continue proton pump inhibitor.


5.  Sepsis.  Etiology is likely secondary to pneumonia.  The patient's blood 


cultures have been negative to date.  Continue current antibiotic regimen.  


Follow up with infectious disease.


6.  Acute respiratory failure, improved.


7.  Encephalopathy, improved.


8.  Diabetes.  Continue current insulin regimen.


9.  Coronary artery disease.  Continue medical management.


10.  Hypothyroidism.  Continue Synthroid.


11.  Hypertension.  Continue current blood pressure regimen.


12.  Dyslipidemia.


Result Diagram:  


3/15/19 0944                                                                    


           3/15/19 0944





Results 24hrs





Laboratory Tests


  Test
            3/16/19
12:11  3/16/19
17:23  3/16/19
20:04  3/17/19
07:45


  Bedside Glucose          170            184           255  H          165








Exam/Review of Systems


Exam


Vitals





Vital Signs


  Date      Temp  Pulse  Resp  B/P (MAP)   Pulse Ox  O2          O2 Flow    FiO2


Time                                                 Delivery    Rate


   3/17/19           68


     08:03


   3/17/19  98.6           18      141/61        99


     07:43                           (87)


   3/16/19                                           Room Air


     13:50


   3/15/19                                                                    21


     17:18


   3/13/19                                                             2.0


     09:15








Intake and Output





3/16/19


3/16/19


3/17/19





1515:00


23:00


07:00





IntakeIntake Total


640 ml


100 ml





OutputOutput Total


1400 ml


1120 ml





BalanceBalance


-1400 ml


-480 ml


100 ml














Results


Results 24hrs





Laboratory Tests


  Test
            3/16/19
12:11  3/16/19
17:23  3/16/19
20:04  3/17/19
07:45


  Bedside Glucose          170            184           255  H          165








Medications


Medication





Current Medications


IV Flush (NS 3 ml) 3 ml PER PROTOCOL IV ;  Start 3/7/19 at 16:00


Insulin Aspart (Novolog Insulin Pen) NOVOLOG *MILD* ALGORITHM WITH MEALS  


BEDTIME SC  Last administered on 3/17/19at 07:55; Admin Dose 1 UNIT;  Start 


3/7/19 at 18:00


Ascorbic Acid (Vitamin C) 500 mg DAILY PO  Last administered on 3/17/19at 08:34;


Admin Dose 500 MG;  Start 3/8/19 at 09:00


Docusate Sodium (Colace) 200 mg HS PO  Last administered on 3/16/19at 20:06; 


Admin Dose 200 MG;  Start 3/7/19 at 21:00


Ferrous Sulfate (Ferrous Sulfate (Ec)) 325 mg DAILY PO  Last administered on 3/1


7/19at 08:35; Admin Dose 325 MG;  Start 3/8/19 at 09:00


Levothyroxine Sodium (Synthroid) 50 mcg BEFORE  BREAKFAST PO  Last administered 


on 3/17/19at 06:09; Admin Dose 50 MCG;  Start 3/8/19 at 07:00


Multivitamins Therapeutic (Theragran) 1 tab DAILY PO  Last administered on 


3/17/19at 08:34; Admin Dose 1 TAB;  Start 3/8/19 at 09:00


Sevelamer Carbonate (Renvela) 0.8 gm WITH  MEALS PO  Last administered on 


3/16/19at 17:32; Admin Dose 0.8 GM;  Start 3/7/19 at 18:00


Epoetin Connor (Epogen (Esrd)) 10,000 units MoWeFr@17 SC  Last administered on 


3/13/19at 17:10; Admin Dose 10,000 UNITS;  Start 3/8/19 at 17:00;  Status Hold


Carvedilol (Coreg) 3.125 mg BID PO  Last administered on 3/17/19at 08:36; Admin 


Dose 3.125 MG;  Start 3/9/19 at 15:00


Ondansetron HCl (Zofran Inj) 4 mg Q4H  PRN IV NAUSEA AND/OR VOMITING Last 


administered on 3/10/19at 14:05; Admin Dose 4 MG;  Start 3/10/19 at 14:00


Acetaminophen (Tylenol Tab) 500 mg Q6H  PRN PO MILD PAIN(1-3)OR ELEVATED TEMP 


Last administered on 3/16/19at 20:08; Admin Dose 500 MG;  Start 3/10/19 at 17:00


Miscellaneous Information 1 ea NOTE XX ;  Start 3/12/19 at 15:30


Glucose (Glutose) 15 gm Q15M  PRN PO DECREASED GLUCOSE;  Start 3/12/19 at 15:30


Glucose (Glutose) 22.5 gm Q15M  PRN PO DECREASED GLUCOSE;  Start 3/12/19 at 


15:30


Dextrose (D50w Syringe) 25 ml Q15M  PRN IV DECREASED GLUCOSE;  Start 3/12/19 at 


15:30


Dextrose (D50w Syringe) 50 ml Q15M  PRN IV DECREASED GLUCOSE;  Start 3/12/19 at 


15:30


Glucagon (Glucagen) 1 mg Q15M  PRN IM DECREASED GLUCOSE;  Start 3/12/19 at 15:30


Glucose (Glutose) 15 gm Q15M  PRN BUCCAL DECREASED GLUCOSE;  Start 3/12/19 at 


15:30


Lactobacillus Acidophilus/ Rhamnosus (Culturelle) 1 cap BID PO  Last 


administered on 3/17/19at 08:35; Admin Dose 1 CAP;  Start 3/13/19 at 21:00


Atorvastatin Calcium (Lipitor) 20 mg QHS PO  Last administered on 3/16/19at 


20:06; Admin Dose 20 MG;  Start 3/13/19 at 21:00


Vitamin B Complex/ Vitamin C (Berocca) 1 cap DAILY PO  Last administered on 


3/16/19at 08:33; Admin Dose 1 CAP;  Start 3/15/19 at 09:00


Pantoprazole (Protonix Tab) 40 mg BID@0600,1800 PO  Last administered on 


3/17/19at 06:09; Admin Dose 40 MG;  Start 3/13/19 at 18:00


Heparin Sodium (Porcine) (Heparin (1000 Units/ml)) 3,300 unit PRN  PRN CATHETER 


Dialysis Last administered on 3/16/19at 13:44; Admin Dose 3,300 UNIT;  Start 


3/14/19 at 17:00


Levalbuterol (Xopenex Neb) 0.63 mg Q4H RESP THERAPY  PRN HHN WHEEZING AND SOB;  


Start 3/15/19 at 20:00











CONSTANTINO MATTHEW DO              Mar 17, 2019 11:11

## 2019-03-17 NOTE — CONS
Assessment/Plan


Assessment/Plan


Assessment/Plan (Daily)


- fever, diaphoresis and chills associated with dialysis prior to admission and 


again on 3/10/2019, concerning for possible catheter related bloodstream 


infection. Another possibility is fever due to GIB


- ESRD on HD via HD catheter on R chest wall. Pt does not remember how old the 


catheter is


- Pyuria r/o UTI


- GIB, scheduled for EGD on 3/11/2019


- DM - Hgb A1c 6.8%


- CAD


- LLL infiltrate vs. atelectasis on CXR upon admission but Pt does not have resp


Sx


- s/p type 2 MI vs. demand ischemia due to acute on chronic anemia


- acute on chronic anemia


- prostatic hypertrophy


- hypothyroidism


- s/p coronary artery bypass grafting


- h/o previous MI


- h/o diabetic wound of RLE


- status post right BKA





Recommendations: 


- Pending: Blood cultures from 3/11/19 (NGTD)


- procalcitonin from 3/10 - 50.34


 - repeat blood cx from HD catheter 3/12/19 (NGTD)- no growth after 24 hrs 


- Discontinue IV vancomycin (3/8/2019-) and renally dosed meropenem (3/11/2019-)


and monitor off antibiotics. 


Management d/w patient and with Dr. Forde in detail.





Consultation Date/Type/Reason


Admit Date/Time


Mar 7, 2019 at 13:32


Initial Consult Date


3/10/19


Requesting Provider:  JEREMY PADILLA NP


Date/Time of Note


DATE: 3/17/19 


TIME: 13:28





Exam/Review of Systems


Exam


Vitals





Vital Signs


  Date      Temp  Pulse  Resp  B/P (MAP)   Pulse Ox  O2          O2 Flow    FiO2


Time                                                 Delivery    Rate


   3/17/19           72


     12:04


   3/17/19  98.7           18      128/56        97


     11:25                           (80)


   3/16/19                                           Room Air


     13:50


   3/15/19                                                                    21


     17:18


   3/13/19                                                             2.0


     09:15








Intake and Output





3/16/19


3/16/19


3/17/19





1515:00


23:00


07:00





IntakeIntake Total


640 ml


100 ml





OutputOutput Total


1400 ml


1120 ml





BalanceBalance


-1400 ml


-480 ml


100 ml











Constitutional:  alert


Psych:  nl mood/affect


Head:  atraumatic


Eyes:  nl lids, nl sclera


Neck:  non-tender


Respiratory:  diminished breath sounds


Cardiovascular:  nl pulses


Gastrointestinal:  soft, non-tender


Musculoskeletal:  muscle weakness


Extremities:  normal pulses


Neurological:  other


Lymph:  nontender





Results


Result Diagram:  


3/15/19 0944                                                                    


           3/15/19 0944





Results 24hrs





Laboratory Tests


  Test
            3/16/19
17:23  3/16/19
20:04  3/17/19
07:45  3/17/19
11:28


  Bedside Glucose          184           255  H          165            159








Medications


Medication





Current Medications


IV Flush (NS 3 ml) 3 ml PER PROTOCOL IV ;  Start 3/7/19 at 16:00


Insulin Aspart (Novolog Insulin Pen) NOVOLOG *MILD* ALGORITHM WITH MEALS  


BEDTIME SC  Last administered on 3/17/19at 11:42; Admin Dose 1 UNIT;  Start 


3/7/19 at 18:00


Ascorbic Acid (Vitamin C) 500 mg DAILY PO  Last administered on 3/17/19at 08:34;


Admin Dose 500 MG;  Start 3/8/19 at 09:00


Docusate Sodium (Colace) 200 mg HS PO  Last administered on 3/16/19at 20:06; 


Admin Dose 200 MG;  Start 3/7/19 at 21:00


Ferrous Sulfate (Ferrous Sulfate (Ec)) 325 mg DAILY PO  Last administered on 


3/17/19at 08:35; Admin Dose 325 MG;  Start 3/8/19 at 09:00


Levothyroxine Sodium (Synthroid) 50 mcg BEFORE  BREAKFAST PO  Last administered 


on 3/17/19at 06:09; Admin Dose 50 MCG;  Start 3/8/19 at 07:00


Multivitamins Therapeutic (Theragran) 1 tab DAILY PO  Last administered on 


3/17/19at 08:34; Admin Dose 1 TAB;  Start 3/8/19 at 09:00


Sevelamer Carbonate (Renvela) 0.8 gm WITH  MEALS PO  Last administered on 


3/16/19at 17:32; Admin Dose 0.8 GM;  Start 3/7/19 at 18:00


Epoetin Connor (Epogen (Esrd)) 10,000 units MoWeFr@17 SC  Last administered on 


3/13/19at 17:10; Admin Dose 10,000 UNITS;  Start 3/8/19 at 17:00;  Status Hold


Carvedilol (Coreg) 3.125 mg BID PO  Last administered on 3/17/19at 08:36; Admin 


Dose 3.125 MG;  Start 3/9/19 at 15:00


Ondansetron HCl (Zofran Inj) 4 mg Q4H  PRN IV NAUSEA AND/OR VOMITING Last 


administered on 3/10/19at 14:05; Admin Dose 4 MG;  Start 3/10/19 at 14:00


Acetaminophen (Tylenol Tab) 500 mg Q6H  PRN PO MILD PAIN(1-3)OR ELEVATED TEMP 


Last administered on 3/16/19at 20:08; Admin Dose 500 MG;  Start 3/10/19 at 17:00


Miscellaneous Information 1 ea NOTE XX ;  Start 3/12/19 at 15:30


Glucose (Glutose) 15 gm Q15M  PRN PO DECREASED GLUCOSE;  Start 3/12/19 at 15:30


Glucose (Glutose) 22.5 gm Q15M  PRN PO DECREASED GLUCOSE;  Start 3/12/19 at 


15:30


Dextrose (D50w Syringe) 25 ml Q15M  PRN IV DECREASED GLUCOSE;  Start 3/12/19 at 


15:30


Dextrose (D50w Syringe) 50 ml Q15M  PRN IV DECREASED GLUCOSE;  Start 3/12/19 at 


15:30


Glucagon (Glucagen) 1 mg Q15M  PRN IM DECREASED GLUCOSE;  Start 3/12/19 at 15:30


Glucose (Glutose) 15 gm Q15M  PRN BUCCAL DECREASED GLUCOSE;  Start 3/12/19 at 


15:30


Lactobacillus Acidophilus/ Rhamnosus (Culturelle) 1 cap BID PO  Last 


administered on 3/17/19at 08:35; Admin Dose 1 CAP;  Start 3/13/19 at 21:00


Atorvastatin Calcium (Lipitor) 20 mg QHS PO  Last administered on 3/16/19at 


20:06; Admin Dose 20 MG;  Start 3/13/19 at 21:00


Vitamin B Complex/ Vitamin C (Berocca) 1 cap DAILY PO  Last administered on 


3/16/19at 08:33; Admin Dose 1 CAP;  Start 3/15/19 at 09:00


Pantoprazole (Protonix Tab) 40 mg BID@0600,1800 PO  Last administered on 


3/17/19at 06:09; Admin Dose 40 MG;  Start 3/13/19 at 18:00


Heparin Sodium (Porcine) (Heparin (1000 Units/ml)) 3,300 unit PRN  PRN CATHETER 


Dialysis Last administered on 3/16/19at 13:44; Admin Dose 3,300 UNIT;  Start 


3/14/19 at 17:00


Levalbuterol (Xopenex Neb) 0.63 mg Q4H RESP THERAPY  PRN HHN WHEEZING AND SOB;  


Start 3/15/19 at 20:00











LM HINES                 Mar 17, 2019 13:36